# Patient Record
Sex: FEMALE | Race: WHITE | NOT HISPANIC OR LATINO | Employment: OTHER | URBAN - METROPOLITAN AREA
[De-identification: names, ages, dates, MRNs, and addresses within clinical notes are randomized per-mention and may not be internally consistent; named-entity substitution may affect disease eponyms.]

---

## 2017-01-05 DIAGNOSIS — G35 MULTIPLE SCLEROSIS: ICD-10-CM

## 2017-01-16 ENCOUNTER — TELEPHONE (OUTPATIENT)
Dept: NEUROLOGY | Facility: CLINIC | Age: 72
End: 2017-01-16

## 2017-01-16 NOTE — TELEPHONE ENCOUNTER
----- Message from Lauren Nicole sent at 1/13/2017 11:13 AM CST -----  Contact: self @ [phone]  Pt says she is returning sisi's call concerning her lab appt.

## 2017-01-16 NOTE — TELEPHONE ENCOUNTER
Tayla says she has enough medication on hand and will get labs done at [facility] on 1/20.

## 2017-01-20 ENCOUNTER — OFFICE VISIT (OUTPATIENT)
Dept: NEUROLOGY | Facility: CLINIC | Age: 72
End: 2017-01-20
Payer: MEDICARE

## 2017-01-20 VITALS
WEIGHT: 176.5 LBS | BODY MASS INDEX: 29.41 KG/M2 | SYSTOLIC BLOOD PRESSURE: 172 MMHG | DIASTOLIC BLOOD PRESSURE: 85 MMHG | HEIGHT: 65 IN | HEART RATE: 94 BPM

## 2017-01-20 DIAGNOSIS — G35 MS (MULTIPLE SCLEROSIS): Primary | ICD-10-CM

## 2017-01-20 DIAGNOSIS — E55.9 VITAMIN D DEFICIENCY: ICD-10-CM

## 2017-01-20 DIAGNOSIS — Z29.89 PROPHYLACTIC IMMUNOTHERAPY: ICD-10-CM

## 2017-01-20 DIAGNOSIS — Z71.89 COUNSELING REGARDING GOALS OF CARE: ICD-10-CM

## 2017-01-20 PROCEDURE — 99999 PR PBB SHADOW E&M-EST. PATIENT-LVL III: CPT | Mod: PBBFAC,,, | Performed by: PSYCHIATRY & NEUROLOGY

## 2017-01-20 PROCEDURE — 99213 OFFICE O/P EST LOW 20 MIN: CPT | Mod: PBBFAC | Performed by: PSYCHIATRY & NEUROLOGY

## 2017-01-20 PROCEDURE — 99215 OFFICE O/P EST HI 40 MIN: CPT | Mod: S$PBB,,, | Performed by: PSYCHIATRY & NEUROLOGY

## 2017-01-20 RX ORDER — INTERFERON BETA-1A 30MCG/.5ML
KIT INTRAMUSCULAR
Qty: 1 KIT | Refills: 5 | Status: SHIPPED | OUTPATIENT
Start: 2017-01-20 | End: 2017-06-27 | Stop reason: SDUPTHER

## 2017-01-20 NOTE — PROGRESS NOTES
Subjective:       Patient ID: Tayla Ewing is a 71 y.o. female who presents today for a routine clinic visit for MS.    MS HPI:  · DMT:Avonex  · Side effects from DMT? No  · Taking vitamin D3 as recommended? Yes -  Dose: 5,000 IU daily  · Overall, she feels stable  · Every so often she gets a sinus headache;  Ibuprofen helps;   · Things going ok with Avonex;  Uses PEN.  Gets flu like sx at times; not bad;   · No new neuro sx;   · Still gets MS hug; comes and goes; uses gabapentin    SOCIAL HISTORY  Social History   Substance Use Topics    Smoking status: Never Smoker    Smokeless tobacco: None    Alcohol use No     Living arrangements - the patient lives with her .  Employment Retired    MS ROS:  · Fatigue: Yes - mild fatigue; not bad;   · Sleep Disturbance: No  · Bladder Dysfunction: Yes - uses pad  · Bowel Dysfunction: Yes - constipation at times; not bad  · Spasticity: Yes - not taking tizanidine;   · Visual Symptoms: No  · Cognitive: Yes - memory ok;   · Mood Disorder: No  · Gait Disturbance: No  · Falls: No  · Hand Dysfunction: No   · Pain: No  · Sexual Dysfunction: Not Assessed  · Skin Breakdown: No  · Tremors: No  · Dysphagia:  No  · Dysarthria:  No  · Heat sensitivity:  Yes - mild;   · Any un-met adaptive needs? No  · Copay Assist?  No--pays $20 every 3 months;   · Clinical Trial candidate? Yes -         Objective:        25 foot timed walk: 3.6 seconds without assist    Neurologic Exam     MENTAL STATUS: Grossly intact.    CRANIAL NERVE EXAM: Shows no evidence of dysarthria or internuclear    ophthalmoplegia or pupillary abnormality.    MOTOR EXAM: Shows slight increase in tone in the lower extremities, but    strength is grossly full graded at 5/5 in all groups.    REFLEXES: 2+ and symmetric throughout. She has a silent right toe, left    toe down.    SENSORY EXAM: Shows slightly decreased vibration sense in the right lower    extremity.    COORDINATION: Shows normal finger-to-nose.    GAIT:  Narrow based and stable.        Labs:     Lab Results   Component Value Date    QTBMPFTL37NI 63 01/20/2017    UIADFTVF34EA 53 12/16/2015    TSAFCCNO67WX 40 12/12/2014       Diagnosis/Assessment/Plan:    1. Multiple Sclerosis  · Assessment: ZAK; pt interested in stopping Avonex; she is ambivalent; she's had no new relapses, no disabiltiy and no new lesions since starting Avonex 8 years ago; given this and her older age, I think reasonable to consider stopping DMT; will get new brain MRI first, and if no new lesions, then ok to stop Avonex  · [facility]  · Disease Modifying Therapies: as above; will check safety labs today;     2. MS Symptom Assessment / Management  · Fatigue: consider CoQ10 for fatigue  · Spasticity: advised to restart hs tizanidine       Over 50% of this 40 minute visit was spent in direct face to face counseling of the patient about her MS and the issues surrounding decisions to stop DMT     Follow up Shannen Solo PA-C      Vitamin D deficiency  -     Vitamin D; Future    MS (multiple sclerosis)  -     MRI Brain W WO Contrast; Future  -     Cancel: Creatinine, serum; Future; Expected date: 1/20/17  -     CBC auto differential; Future; Expected date: 1/20/17  -     Comprehensive metabolic panel; Future

## 2017-01-20 NOTE — MR AVS SNAPSHOT
[facility]  [address]  [facility] [address]  Phone: [phone]                  Tayla Ewing   2017 9:00 AM   Office Visit    Description:  Female : 1945   Provider:  Maria Del Carmen Martins MD   Department:  [facility]           Reason for Visit     Neurologic Problem           Diagnoses this Visit        Comments    Vitamin D deficiency    -  Primary     MS (multiple sclerosis)                To Do List           Future Appointments        Provider Department Dept Phone    2017 10:30 AM LAB, SAME DAY [facility] [phone]      Goals (5 Years of Data)     None      Follow-Up and Disposition     Return in about 6 months (around 2017).      [facility]     [facility] Care [facility] -  Assistance  Registered nurses in the [facility] provide clinical advisement, health education, appointment booking, and other advisory services.  Call for this free service at [phone].             Medications           Message regarding Medications     Verify the changes and/or additions to your medication regime listed below are the same as discussed with your clinician today.  If any of these changes or additions are incorrect, please notify your healthcare provider.             Verify that the below list of medications is an accurate representation of the medications you are currently taking.  If none reported, the list may be blank. If incorrect, please contact your healthcare provider. Carry this list with you in case of emergency.           Current Medications     AVONEX 30 mcg/0.5 mL PnKt INJECT 30 MCG INTRAMUSCULARLY EVERY 7 DAYS    azelastine-fluticasone 137-50 mcg/spray [place] by Nasal route as needed.     b complex vitamins tablet Take 1 tablet by mouth once daily.    cholecalciferol, vitamin D3, (VITAMIN D3) 5,000 unit Tab Take 5,000 Units by mouth once daily.    fexofenadine (ALLEGRA) 30 MG  "tablet Take 30 mg by mouth as needed.     gabapentin (NEURONTIN) 100 MG capsule Take 1 capsule (100 mg total) by mouth 3 (three) times daily.    losartan-hydrochlorothiazide 50-12.5 mg (HYZAAR) 50-12.5 mg per tablet Take 2 tablets by mouth once daily.     tizanidine 2 mg Cap Take 1 to 2 tabs po qhs prn    vitamin E 1000 UNIT capsule Take 1,000 Units by mouth once daily.    interferon beta-1a, albumin, (AVONEX, WITH ALBUMIN,) 30 mcg injection Inject 30 mcg into the muscle every 7 days.           Clinical Reference Information           Vital Signs - Last Recorded  Most recent update: 1/20/2017  9:20 AM by Anne Fry MA    BP Pulse Ht Wt BMI    (!) 172/85 94 5' 5" (1.651 m) 80.1 kg (176 lb 8 oz) 29.37 kg/m2      Blood Pressure          Most Recent Value    BP  (!)  172/85      Allergies as of 1/20/2017     No Known Allergies      Immunizations Administered on Date of Encounter - 1/20/2017     None      Orders Placed During Today's Visit      Normal Orders This Visit    MRI Brain W WO Contrast     Future Labs/Procedures Expected by Expires    CBC auto differential  1/20/2017 3/21/2018    Comprehensive metabolic panel  As directed 1/20/2018    MRI Brain W WO Contrast  As directed 1/20/2018    Vitamin D  As directed 1/20/2018      Instructions    Consider taking CoQ10 500 mg per day for fatigue       "

## 2017-01-31 DIAGNOSIS — R25.2 SPASTICITY: Primary | ICD-10-CM

## 2017-01-31 RX ORDER — TIZANIDINE HYDROCHLORIDE 2 MG/1
CAPSULE, GELATIN COATED ORAL
Qty: 60 CAPSULE | Refills: 6 | Status: SHIPPED | OUTPATIENT
Start: 2017-01-31 | End: 2017-11-29 | Stop reason: SDUPTHER

## 2017-06-27 ENCOUNTER — TELEPHONE (OUTPATIENT)
Dept: NEUROLOGY | Facility: CLINIC | Age: 72
End: 2017-06-27

## 2017-06-27 DIAGNOSIS — G35 MULTIPLE SCLEROSIS: ICD-10-CM

## 2017-06-27 DIAGNOSIS — Z79.899 HIGH RISK MEDICATION USE: ICD-10-CM

## 2017-06-27 DIAGNOSIS — G35 MULTIPLE SCLEROSIS: Primary | ICD-10-CM

## 2017-06-30 ENCOUNTER — TELEPHONE (OUTPATIENT)
Dept: NEUROLOGY | Facility: CLINIC | Age: 72
End: 2017-06-30

## 2017-06-30 NOTE — TELEPHONE ENCOUNTER
----- Message from Darlin Evans sent at 6/30/2017  1:50 PM CDT -----  Contact: Pt  Pt missed a call and would like the nurse to return their call.          Please call pt at [phone].        Thanks!

## 2017-06-30 NOTE — TELEPHONE ENCOUNTER
"Avonex safety labs scheduled on 7/5/2017. F/U appt scheduled with Shannen on 7/19. MRI Brain orders placed in "to be faxed" folder. Referral updated in epic.   "

## 2017-07-05 ENCOUNTER — DOCUMENTATION ONLY (OUTPATIENT)
Dept: NEUROLOGY | Facility: CLINIC | Age: 72
End: 2017-07-05

## 2017-07-05 NOTE — PROGRESS NOTES
Faxed Brain MRI Referral to [facility] at [phone] on 7/5/17

## 2017-07-07 ENCOUNTER — LAB VISIT (OUTPATIENT)
Dept: LAB | Facility: HOSPITAL | Age: 72
End: 2017-07-07
Attending: PHYSICIAN ASSISTANT
Payer: MEDICARE

## 2017-07-07 DIAGNOSIS — Z79.899 HIGH RISK MEDICATION USE: ICD-10-CM

## 2017-07-07 DIAGNOSIS — G35 MULTIPLE SCLEROSIS: ICD-10-CM

## 2017-07-07 LAB
ALBUMIN SERPL BCP-MCNC: 3.7 G/DL
ALP SERPL-CCNC: 87 U/L
ALT SERPL W/O P-5'-P-CCNC: 16 U/L
AST SERPL-CCNC: 14 U/L
BASOPHILS # BLD AUTO: 0 K/UL
BASOPHILS NFR BLD: 0.3 %
BILIRUB DIRECT SERPL-MCNC: 0.1 MG/DL
BILIRUB SERPL-MCNC: 0.4 MG/DL
DIFFERENTIAL METHOD: ABNORMAL
EOSINOPHIL # BLD AUTO: 0.1 K/UL
EOSINOPHIL NFR BLD: 1.5 %
ERYTHROCYTE [DISTWIDTH] IN BLOOD BY AUTOMATED COUNT: 13.4 %
HCT VFR BLD AUTO: 34.8 %
HGB BLD-MCNC: 12.3 G/DL
LYMPHOCYTES # BLD AUTO: 2.7 K/UL
LYMPHOCYTES NFR BLD: 35 %
MCH RBC QN AUTO: 30 PG
MCHC RBC AUTO-ENTMCNC: 35.2 %
MCV RBC AUTO: 85 FL
MONOCYTES # BLD AUTO: 0.7 K/UL
MONOCYTES NFR BLD: 9 %
NEUTROPHILS # BLD AUTO: 4.2 K/UL
NEUTROPHILS NFR BLD: 54.2 %
PLATELET # BLD AUTO: 239 K/UL
PMV BLD AUTO: 7.9 FL
PROT SERPL-MCNC: 7.5 G/DL
RBC # BLD AUTO: 4.08 M/UL
WBC # BLD AUTO: 7.7 K/UL

## 2017-07-07 PROCEDURE — 80076 HEPATIC FUNCTION PANEL: CPT

## 2017-07-07 PROCEDURE — 85025 COMPLETE CBC W/AUTO DIFF WBC: CPT

## 2017-07-07 PROCEDURE — 36415 COLL VENOUS BLD VENIPUNCTURE: CPT

## 2017-07-11 NOTE — TELEPHONE ENCOUNTER
----- Message from Demetrius Valle sent at 7/11/2017  3:33 PM CDT -----  Contact: pharmacy  Refill AVONEX 30 mcg/0.5 mL PnKt      Call; [phone]

## 2017-07-12 ENCOUNTER — DOCUMENTATION ONLY (OUTPATIENT)
Dept: NEUROLOGY | Facility: CLINIC | Age: 72
End: 2017-07-12

## 2017-07-12 RX ORDER — INTERFERON BETA-1A 30MCG/.5ML
KIT INTRAMUSCULAR
Qty: 3 KIT | Refills: 1 | Status: SHIPPED | OUTPATIENT
Start: 2017-07-12 | End: 2018-09-06

## 2017-07-12 NOTE — PROGRESS NOTES
Fax received from [facility] with brain MRI results (neuroquant) from 7/12/17. Placed in Shannen's folder for review.

## 2017-07-17 ENCOUNTER — DOCUMENTATION ONLY (OUTPATIENT)
Dept: NEUROLOGY | Facility: CLINIC | Age: 72
End: 2017-07-17

## 2017-07-19 ENCOUNTER — TELEPHONE (OUTPATIENT)
Dept: NEUROLOGY | Facility: CLINIC | Age: 72
End: 2017-07-19

## 2017-07-19 ENCOUNTER — OFFICE VISIT (OUTPATIENT)
Dept: NEUROLOGY | Facility: CLINIC | Age: 72
End: 2017-07-19
Payer: MEDICARE

## 2017-07-19 VITALS
HEIGHT: 65 IN | WEIGHT: 185 LBS | BODY MASS INDEX: 30.82 KG/M2 | HEART RATE: 76 BPM | SYSTOLIC BLOOD PRESSURE: 151 MMHG | DIASTOLIC BLOOD PRESSURE: 69 MMHG

## 2017-07-19 DIAGNOSIS — E55.9 VITAMIN D INSUFFICIENCY: ICD-10-CM

## 2017-07-19 DIAGNOSIS — M62.838 MUSCLE SPASM: ICD-10-CM

## 2017-07-19 DIAGNOSIS — G35 MS (MULTIPLE SCLEROSIS): Primary | ICD-10-CM

## 2017-07-19 DIAGNOSIS — Z71.89 COUNSELING REGARDING GOALS OF CARE: ICD-10-CM

## 2017-07-19 DIAGNOSIS — M79.2 NEUROPATHIC PAIN: ICD-10-CM

## 2017-07-19 DIAGNOSIS — N31.9 NEUROGENIC BLADDER: ICD-10-CM

## 2017-07-19 PROCEDURE — 1159F MED LIST DOCD IN RCRD: CPT | Mod: ,,, | Performed by: PHYSICIAN ASSISTANT

## 2017-07-19 PROCEDURE — 99213 OFFICE O/P EST LOW 20 MIN: CPT | Mod: PBBFAC | Performed by: PHYSICIAN ASSISTANT

## 2017-07-19 PROCEDURE — 99999 PR PBB SHADOW E&M-EST. PATIENT-LVL III: CPT | Mod: PBBFAC,,, | Performed by: PHYSICIAN ASSISTANT

## 2017-07-19 PROCEDURE — 1126F AMNT PAIN NOTED NONE PRSNT: CPT | Mod: ,,, | Performed by: PHYSICIAN ASSISTANT

## 2017-07-19 PROCEDURE — 99215 OFFICE O/P EST HI 40 MIN: CPT | Mod: S$PBB,,, | Performed by: PHYSICIAN ASSISTANT

## 2017-07-19 NOTE — PROGRESS NOTES
Subjective:       Patient ID: Tayla Ewing is a 72 y.o. female who presents today with  for a routine clinic visit for MS.    MS HPI:  · DMT: Avonex  · Side effects from DMT? No  · Taking vitamin D3 as recommended? Yes -  Dose: 10,000 IU daily  · Having headaches at times-managed with Tylenol   · Continues to have NP pain(tingling-head, LE's), but gabapentin is helpful  · Continues to be very active  · She states she would like to stop Avonex if her MRI was stable    SOCIAL HISTORY  Social History   Substance Use Topics    Smoking status: Never Smoker    Smokeless tobacco: Not on file    Alcohol use No     Living arrangements - the patient lives with her .  Employment Retired    MS ROS:  · Fatigue: Yes - tired at the end of the day  · Sleep Disturbance: No  · Bladder Dysfunction: Yes - frequency during the day, no problem overnight--defers medication  · Bowel Dysfunction: No  · Spasticity: Yes - tizandine 2mg HS occasionally   · Visual Symptoms: No  · Cognitive: Yes - memory issues, word finding. Comes and goes  · Mood Disorder: No  · Gait Disturbance: No  · Falls: No  · Hand Dysfunction: No  · Pain: Yes -  Gabapentin is helpful. Taking 100mg HS and will occasionally take during the day  · Sexual Dysfunction: Not Assessed  · Skin Breakdown: No  · Tremors:at times  · Dysphagia:  Yes - feels like food is getting at times.   · Dysarthria:  No  · Heat sensitivity:  Yes   · Any un-met adaptive needs? No  · Copay Assist?  Yes   · Clinical Trial candidate? No          Objective:        1. 25 foot timed walk: 3.6s last visit January 2017  Timed 25 Foot Walk: 7/19/2017   Did patient wear an AFO? No   Was assistive device used? No   Time for 25 Foot Walk (seconds) 3.6       Neurologic Exam     Mental Status   Oriented to person, place, and time.   Follows 3 step commands.   Speech: speech is normal   Level of consciousness: alert  Normal comprehension.     Cranial Nerves     CN II   Visual acuity: normal  (20/30 OD and 20/25 OS with Snellen hand held chart at 6 ft)    CN III, IV, VI   Pupils are equal, round, and reactive to light.  Extraocular motions are normal.     CN V   Facial sensation intact.     CN VII   Facial expression full, symmetric.     CN VIII   Hearing: impaired (Impaired since childhood R ear, decreased to finger rub on L )    CN IX, X   Palate: symmetric    CN XI   CN XI normal.     CN XII   Tongue deviation: none    Motor Exam   Muscle bulk: normal  Overall muscle tone: normal    Strength   Right deltoid: 5/5  Left deltoid: 5/5  Right triceps: 5/5  Left triceps: 5/5  Right wrist extension: 5/5  Left wrist extension: 5/5  Right interossei: 5/5  Left interossei: 5/5  Right iliopsoas: 5/5  Left iliopsoas: 5/5  Right hamstrin/5  Left hamstrin/5  Right anterior tibial: 5/5  Left anterior tibial: 5/5  Right peroneal: 5/5  Left peroneal: 5/5    Sensory Exam   Light touch normal.   Vibration normal.     Gait, Coordination, and Reflexes     Gait  Gait: normal    Coordination   Finger to nose coordination: abnormal (L Ue)  Tandem walking coordination: normal    Tremor   Resting tremor: absent  Action tremor: absent    Reflexes   Right brachioradialis: 2+  Left brachioradialis: 2+  Right biceps: 2+  Left biceps: 2+  Right triceps: 2+  Left triceps: 2+  Right patellar: 2+  Left patellar: 2+  Right achilles: 2+  Left achilles: 2+  Right plantar: normal  Left plantar: normal  Right ankle clonus: absent  Left ankle clonus: absent  Right pendular knee jerk: absent  Left pendular knee jerk: absentNormal Heel/toe walk  Normal RSM           Imaging:     MRI Brain and volumetric analysis report and disc received from [facility]  Performed on 2017  MRI Brain -stable with no new or enhancing lesions  Disc reviewed  Will upload into EPIC for future reference  Labs:     Lab Results   Component Value Date    WBC 7.70 2017    RBC 4.08 2017    HGB 12.3 2017    HCT 34.8 (L) 2017     MCV 85 07/07/2017    MCH 30.0 07/07/2017    MCHC 35.2 07/07/2017    RDW 13.4 07/07/2017     07/07/2017    MPV 7.9 (L) 07/07/2017    GRAN 4.2 07/07/2017    GRAN 54.2 07/07/2017    LYMPH 2.7 07/07/2017    LYMPH 35.0 07/07/2017    MONO 0.7 07/07/2017    MONO 9.0 07/07/2017    EOS 0.1 07/07/2017    BASO 0.00 07/07/2017    EOSINOPHIL 1.5 07/07/2017    BASOPHIL 0.3 07/07/2017     Lab Results   Component Value Date    ALT 16 07/07/2017    AST 14 07/07/2017    ALKPHOS 87 07/07/2017    BILITOT 0.4 07/07/2017         Lab Results   Component Value Date    GGMMOKXH57AD 63 01/20/2017    WSEIGGUJ41FH 53 12/16/2015    YKLLWRCQ34CG 40 12/12/2014     No results found for: JCVINDEX, JCVANTIBODY  No results found for: TE0VKQFA, ABSOLUTECD3, NB1AFWAR, ABSOLUTECD8, YT4YVKMF, ABSOLUTECD4, LABCD48    Diagnosis/Assessment/Plan:    1. Multiple Sclerosis  · Assessment: Patient does continue to appear to be stable. She would like to stop Avonex. I do feel this is reasonable considering her age and no new disability since diagnosis 8 years ago. Will evaluate need for new imaging at next follow up visit, may consider repeat imaging for the first 1-2 years off DMT.   · Imaging: MRI Brain stable, reviewed images with patient and  today in clinic  · Disease Modifying Therapies: Will stop Avonex now, she will continue with high dose Vit D3(10,000IU/day),  as more and more data suggests that high circulating blood levels of vitamin D has an ameliorating effect on the disease course in multiple sclerosis in general.       2. MS Symptom Assessment / Management  No changes to symptom management today.       Over 50% of this 40 minute visit was spent in direct face to face counseling of the patient and her  regarding her current symptoms and management of same.  Follow up in 6 months with Dr. Martins  Patient agreed to POC today.    Attending, Dr. Martins, was available during today's encounter. Any change to plan along with cosign to  appear in the EMR.     Shannen Solo PA-C  [facility]        MS (multiple sclerosis)    Neuropathic pain    Neurogenic bladder    Vitamin D insufficiency    Muscle spasm    Counseling regarding goals of care

## 2017-07-19 NOTE — PROGRESS NOTES
MRI Brain and volumetric analysis report and disc received from [facility]  Performed on 7/12/2017  MRI Brain -stable with no new or enhancing lesions  Disc reviewed  Will upload into EPIC for future reference

## 2017-07-19 NOTE — TELEPHONE ENCOUNTER
----- Message from Shannen Solo PA-C sent at 7/19/2017  9:58 AM CDT -----  We are discontinuing Avonex --she will not be on DMT any longer unless her status changes. Please notify pharmacy/drug company.

## 2017-07-26 ENCOUNTER — DOCUMENTATION ONLY (OUTPATIENT)
Dept: NEUROLOGY | Facility: CLINIC | Age: 72
End: 2017-07-26

## 2017-10-10 RX ORDER — LOSARTAN POTASSIUM AND HYDROCHLOROTHIAZIDE 25; 100 MG/1; MG/1
TABLET ORAL
Qty: 90 TABLET | Refills: 0 | Status: SHIPPED | OUTPATIENT
Start: 2017-10-10 | End: 2018-01-08 | Stop reason: SDUPTHER

## 2017-11-16 RX ORDER — FENOFIBRATE 134 MG/1
CAPSULE ORAL
Qty: 90 CAPSULE | Refills: 3 | Status: SHIPPED | OUTPATIENT
Start: 2017-11-16 | End: 2018-09-06

## 2017-11-29 DIAGNOSIS — R25.2 SPASTICITY: ICD-10-CM

## 2017-11-29 DIAGNOSIS — G35 MULTIPLE SCLEROSIS: ICD-10-CM

## 2017-11-29 DIAGNOSIS — G62.9 NEUROPATHY: ICD-10-CM

## 2017-11-29 RX ORDER — GABAPENTIN 100 MG/1
100 CAPSULE ORAL 3 TIMES DAILY
Qty: 90 CAPSULE | Refills: 5 | Status: SHIPPED | OUTPATIENT
Start: 2017-11-29

## 2017-11-29 RX ORDER — TIZANIDINE HYDROCHLORIDE 2 MG/1
CAPSULE, GELATIN COATED ORAL
Qty: 60 CAPSULE | Refills: 6 | Status: SHIPPED | OUTPATIENT
Start: 2017-11-29 | End: 2018-09-06

## 2018-01-08 RX ORDER — LOSARTAN POTASSIUM AND HYDROCHLOROTHIAZIDE 25; 100 MG/1; MG/1
TABLET ORAL
Qty: 90 TABLET | Refills: 0 | Status: SHIPPED | OUTPATIENT
Start: 2018-01-08 | End: 2018-04-08 | Stop reason: SDUPTHER

## 2018-03-19 ENCOUNTER — TELEPHONE (OUTPATIENT)
Dept: NEUROLOGY | Facility: CLINIC | Age: 73
End: 2018-03-19

## 2018-03-19 NOTE — TELEPHONE ENCOUNTER
----- Message from Mary Sierra sent at 3/19/2018  8:58 AM CDT -----  Contact: pt @ [phone]  Calling to schedule an appt with Dr. Martins. Please call.

## 2018-03-19 NOTE — TELEPHONE ENCOUNTER
Pt has been scheduled for f/u with BB. She also wanted to know if a MRI was to be done prior to f/u or would it be ordered after visit.

## 2018-04-09 RX ORDER — LOSARTAN POTASSIUM AND HYDROCHLOROTHIAZIDE 25; 100 MG/1; MG/1
TABLET ORAL
Qty: 90 TABLET | Refills: 0 | Status: SHIPPED | OUTPATIENT
Start: 2018-04-09 | End: 2018-09-06

## 2018-08-30 ENCOUNTER — TELEPHONE (OUTPATIENT)
Dept: NEUROLOGY | Facility: CLINIC | Age: 73
End: 2018-08-30

## 2018-08-30 NOTE — TELEPHONE ENCOUNTER
----- Message from Brett Paul sent at 8/30/2018  9:00 AM CDT -----  Contact: Patient @ [phone]  Patient is calling to r/s the 8-30th appt ( car issues ), pls call

## 2018-09-06 ENCOUNTER — OFFICE VISIT (OUTPATIENT)
Dept: NEUROLOGY | Facility: CLINIC | Age: 73
End: 2018-09-06
Payer: MEDICARE

## 2018-09-06 ENCOUNTER — DOCUMENTATION ONLY (OUTPATIENT)
Dept: NEUROLOGY | Facility: CLINIC | Age: 73
End: 2018-09-06

## 2018-09-06 ENCOUNTER — LAB VISIT (OUTPATIENT)
Dept: LAB | Facility: HOSPITAL | Age: 73
End: 2018-09-06
Payer: MEDICARE

## 2018-09-06 VITALS
WEIGHT: 167.44 LBS | HEART RATE: 71 BPM | DIASTOLIC BLOOD PRESSURE: 62 MMHG | BODY MASS INDEX: 27.9 KG/M2 | SYSTOLIC BLOOD PRESSURE: 130 MMHG | HEIGHT: 65 IN

## 2018-09-06 DIAGNOSIS — R25.2 SPASTICITY: ICD-10-CM

## 2018-09-06 DIAGNOSIS — G35 MULTIPLE SCLEROSIS: Primary | ICD-10-CM

## 2018-09-06 DIAGNOSIS — G35 MULTIPLE SCLEROSIS: ICD-10-CM

## 2018-09-06 DIAGNOSIS — Z71.89 COUNSELING REGARDING GOALS OF CARE: ICD-10-CM

## 2018-09-06 DIAGNOSIS — E55.9 VITAMIN D INSUFFICIENCY: ICD-10-CM

## 2018-09-06 DIAGNOSIS — M79.2 NEUROPATHIC PAIN: ICD-10-CM

## 2018-09-06 DIAGNOSIS — I25.2 HISTORY OF HEART ATTACK: ICD-10-CM

## 2018-09-06 DIAGNOSIS — Z95.5 HISTORY OF HEART ARTERY STENT: ICD-10-CM

## 2018-09-06 DIAGNOSIS — N31.9 NEUROGENIC BLADDER: ICD-10-CM

## 2018-09-06 LAB — 25(OH)D3+25(OH)D2 SERPL-MCNC: 51 NG/ML

## 2018-09-06 PROCEDURE — 36415 COLL VENOUS BLD VENIPUNCTURE: CPT

## 2018-09-06 PROCEDURE — 99999 PR PBB SHADOW E&M-EST. PATIENT-LVL III: CPT | Mod: PBBFAC,,, | Performed by: PHYSICIAN ASSISTANT

## 2018-09-06 PROCEDURE — 99213 OFFICE O/P EST LOW 20 MIN: CPT | Mod: PBBFAC | Performed by: PHYSICIAN ASSISTANT

## 2018-09-06 PROCEDURE — 99215 OFFICE O/P EST HI 40 MIN: CPT | Mod: S$PBB,,, | Performed by: PHYSICIAN ASSISTANT

## 2018-09-06 PROCEDURE — 82306 VITAMIN D 25 HYDROXY: CPT

## 2018-09-06 RX ORDER — OLMESARTAN MEDOXOMIL AND HYDROCHLOROTHIAZIDE 20/12.5 20; 12.5 MG/1; MG/1
1 TABLET ORAL DAILY
COMMUNITY
Start: 2018-08-27 | End: 2022-07-20

## 2018-09-06 RX ORDER — AMLODIPINE BESYLATE 5 MG/1
5 TABLET ORAL DAILY
COMMUNITY
Start: 2018-08-27

## 2018-09-06 RX ORDER — CARVEDILOL 6.25 MG/1
6.25 TABLET ORAL 2 TIMES DAILY
COMMUNITY
Start: 2018-08-27

## 2018-09-06 RX ORDER — ASPIRIN 81 MG/1
81 TABLET ORAL DAILY
COMMUNITY
End: 2022-07-13 | Stop reason: CLARIF

## 2018-09-06 RX ORDER — TICAGRELOR 90 MG/1
90 TABLET ORAL 2 TIMES DAILY
COMMUNITY
Start: 2018-08-27 | End: 2022-05-31

## 2018-09-06 RX ORDER — ATORVASTATIN CALCIUM 80 MG/1
80 TABLET, FILM COATED ORAL DAILY
COMMUNITY
Start: 2018-08-27 | End: 2022-05-31

## 2018-09-06 NOTE — PROGRESS NOTES
Subjective:       Patient ID: Tayla Ewing is a 73 y.o. female who presents today with her  for a routine clinic visit for MS.  Last seen in July 2017 by this provider. Unfortunately, did not follow up for 6 month visit    MS HPI:  · DMT: none-stopped DMT after last visit(mutual decision making)  · Side effects from DMT? N/A  · Taking vitamin D3 as recommended? Yes - 10,000IU/d   · Had Heart attack April of 2018-has initiated new meds(blood thinners, HTN, chol)  · She feels she is stable from MS perspective    SOCIAL HISTORY  Social History     Tobacco Use    Smoking status: Never Smoker   Substance Use Topics    Alcohol use: No    Drug use: Not on file     Living arrangements - the patient lives with their spouse.  Employment not employeed    MS ROS:  · Fatigue: Yes - tired at the end of the day, but she wakes early before 5AM  · Sleep Disturbance: No  · Bladder Dysfunction: Yes - frequency during the day, no problem overnight--defers medication--drinks a lot of coffee and tea  · Bowel Dysfunction: No  · Spasticity: Yes - tizandine 2mg HS occasionally   · Visual Symptoms: No  · Cognitive: Yes - memory issues, word finding. Comes and goes--states this is stable no worse  · Mood Disorder: No  · Gait Disturbance: No  · Falls: No  · Hand Dysfunction: No  · Pain: Yes -  Gabapentin is helpful. Taking 100mg HS if needed and will occasionally take during the day  · Sexual Dysfunction: Not Assessed  · Skin Breakdown: No  · Tremors:at times  · Dysarthria:  No  · Heat sensitivity:  Yes   · Any un-met adaptive needs? No  · Copay Assist?  Yes   · Clinical Trial candidate? No         Objective:        1. 25 foot timed walk: 3.6s previous visit; 3.7s today  Timed 25 Foot Walk: 7/19/2017   Did patient wear an AFO? No   Was assistive device used? No   Time for 25 Foot Walk (seconds) 3.6     Neurologic Exam     Mental Status   Oriented to person, place, and time.   Follows 3 step commands.   Speech: speech is normal    Level of consciousness: alert  Normal comprehension.     Cranial Nerves     CN II   Visual acuity: normal (20/30 OD and 20/20 OS with Snellen hand held chart at 6 ft)    CN III, IV, VI   Pupils are equal, round, and reactive to light.  Extraocular motions are normal.     CN V   Facial sensation intact.     CN VII   Facial expression full, symmetric.     CN VIII   Hearing: impaired (Impaired since childhood R ear, decreased to finger rub on L )    CN IX, X   Palate: symmetric    CN XI   CN XI normal.     CN XII   Tongue deviation: none    Motor Exam   Muscle bulk: normal  Overall muscle tone: normal    Strength   Right deltoid: 5/5  Left deltoid: 5/5  Right triceps: 5/5  Left triceps: 5/5  Right wrist extension: 5/5  Left wrist extension: 5/5  Right interossei: 5/5  Left interossei: 5/5  Right iliopsoas: 5/5  Left iliopsoas: 5/5  Right hamstrin/5  Left hamstrin/5  Right anterior tibial: 5/5  Left anterior tibial: 5/5  Right peroneal: 5/5  Left peroneal: 5/5    Sensory Exam   Light touch normal.   Vibration normal.     Gait, Coordination, and Reflexes     Gait  Gait: normal    Coordination   Finger to nose coordination: abnormal (L UE)  Tandem walking coordination: normal    Tremor   Resting tremor: absent  Action tremor: absent    Reflexes   Right brachioradialis: 2+  Left brachioradialis: 2+  Right biceps: 2+  Left biceps: 2+  Right triceps: 2+  Left triceps: 2+  Right patellar: 2+  Left patellar: 2+  Right achilles: 2+  Left achilles: 2+  Right plantar: normal  Left plantar: normal  Right ankle clonus: absent  Left ankle clonus: absent  Right pendular knee jerk: absent  Left pendular knee jerk: absentNormal Heel/toe walk  Normal RSM         Imagin/2017-[facility]    Labs:     Lab Results   Component Value Date    AGXHOQUV90HL 51 2018    BSVZKLJN35RG 63 2017    VRSXNUUT58LB 53 2015     No results found for: JCVINDEX, JCVANTIBODY  No results found for:  EO6STBPE, ABSOLUTECD3, GG0CAJGQ, ABSOLUTECD8, VS4KROHL, ABSOLUTECD4, LABCD48  Lab Results   Component Value Date    WBC 7.70 07/07/2017    RBC 4.08 07/07/2017    HGB 12.3 07/07/2017    HCT 34.8 (L) 07/07/2017    MCV 85 07/07/2017    MCH 30.0 07/07/2017    MCHC 35.2 07/07/2017    RDW 13.4 07/07/2017     07/07/2017    MPV 7.9 (L) 07/07/2017    GRAN 4.2 07/07/2017    GRAN 54.2 07/07/2017    LYMPH 2.7 07/07/2017    LYMPH 35.0 07/07/2017    MONO 0.7 07/07/2017    MONO 9.0 07/07/2017    EOS 0.1 07/07/2017    BASO 0.00 07/07/2017    EOSINOPHIL 1.5 07/07/2017    BASOPHIL 0.3 07/07/2017     Sodium   Date Value Ref Range Status   01/20/2017 141 136 - 145 mmol/L Final     Potassium   Date Value Ref Range Status   01/20/2017 4.1 3.5 - 5.1 mmol/L Final     Chloride   Date Value Ref Range Status   01/20/2017 107 95 - 110 mmol/L Final     CO2   Date Value Ref Range Status   01/20/2017 24 23 - 29 mmol/L Final     Glucose   Date Value Ref Range Status   01/20/2017 96 70 - 110 mg/dL Final     BUN, Bld   Date Value Ref Range Status   01/20/2017 18 8 - 23 mg/dL Final     Creatinine   Date Value Ref Range Status   01/20/2017 0.8 0.5 - 1.4 mg/dL Final     Calcium   Date Value Ref Range Status   01/20/2017 10.2 8.7 - 10.5 mg/dL Final     Total Protein   Date Value Ref Range Status   07/07/2017 7.5 6.0 - 8.4 g/dL Final     Albumin   Date Value Ref Range Status   07/07/2017 3.7 3.5 - 5.2 g/dL Final     Total Bilirubin   Date Value Ref Range Status   07/07/2017 0.4 0.1 - 1.0 mg/dL Final     Comment:     For infants and newborns, interpretation of results should be based  on gestational age, weight and in agreement with clinical  observations.  Premature Infant recommended reference ranges:  Up to 24 hours.............<8.0 mg/dL  Up to 48 hours............<12.0 mg/dL  3-5 days..................<15.0 mg/dL  6-29 days.................<15.0 mg/dL       Alkaline Phosphatase   Date Value Ref Range Status   07/07/2017 87 55 - 135 U/L Final      AST   Date Value Ref Range Status   07/07/2017 14 10 - 40 U/L Final     ALT   Date Value Ref Range Status   07/07/2017 16 10 - 44 U/L Final     Anion Gap   Date Value Ref Range Status   01/20/2017 10 8 - 16 mmol/L Final     eGFR if    Date Value Ref Range Status   01/20/2017 >60.0 >60 mL/min/1.73 m^2 Final     eGFR if non    Date Value Ref Range Status   01/20/2017 >60.0 >60 mL/min/1.73 m^2 Final     Comment:     Calculation used to obtain the estimated glomerular filtration  rate (eGFR) is the CKD-EPI equation. Since race is unknown   in our information system, the eGFR values for   -American and Non--American patients are given   for each creatinine result.       Diagnosis/Assessment/Plan:    1. Multiple Sclerosis  · Assessment: Patient appears clinically stable. She is not on DMT, but is on high dose Vit D3. Will request information from [facility] related to recent heart attack and stent placement(patient to sign release-will upload records)  · Imaging: I would like to get MRI without contrast to ensure stability as she has been off DMT for slightly over a year now.  · Disease Modifying Therapies: Continue Vit D3-will check level today. We will continue off DMT unless her MRI shows new lesions.     2. MS Symptom Assessment / Management  · Spasticity: continue Tizanidine 2mg PRN  · Pain: gabapentin 100mg as needed    Over 50% of this 40 minute visit was spent in direct face to face counseling of the patient about MS, DMT considerations, and MS symptom management.   Follow-up in about 6 months (around 3/6/2019) for follow up with Dr. Martins.  Patient agreed to POC today.    Attending, Dr. Martins, was available during today's encounter. Any change to plan along with cosign to appear in the EMR.     Shannen Solo PA-C  [facility]        Multiple sclerosis  -     Vitamin D; Future  -     MRI Brain Demyelinating Without Contrast; Future; Expected date: 09/06/2018    Vitamin D  insufficiency  -     Vitamin D; Future

## 2018-09-06 NOTE — PROGRESS NOTES
Faxed BRITT to [facility]      -discharge summary-Type of Stent placed.    [phone]-fax  [phone]-phone

## 2018-09-17 ENCOUNTER — TELEPHONE (OUTPATIENT)
Dept: NEUROLOGY | Facility: CLINIC | Age: 73
End: 2018-09-17

## 2018-09-17 NOTE — TELEPHONE ENCOUNTER
Notified Evie in Radiology that patient's stent card has been uploaded to media. Evie reviewed card information.

## 2018-09-20 ENCOUNTER — HOSPITAL ENCOUNTER (OUTPATIENT)
Dept: RADIOLOGY | Facility: HOSPITAL | Age: 73
Discharge: HOME | End: 2018-09-20
Attending: PHYSICIAN ASSISTANT
Payer: MEDICARE

## 2018-09-20 DIAGNOSIS — G35 MULTIPLE SCLEROSIS: ICD-10-CM

## 2018-09-20 PROCEDURE — 70551 MRI BRAIN STEM W/O DYE: CPT | Mod: 26,,, | Performed by: RADIOLOGY

## 2018-09-20 PROCEDURE — 70551 MRI BRAIN STEM W/O DYE: CPT | Mod: TC

## 2018-11-28 DIAGNOSIS — R25.2 SPASTICITY: ICD-10-CM

## 2018-11-28 DIAGNOSIS — G35 MULTIPLE SCLEROSIS: ICD-10-CM

## 2018-11-28 RX ORDER — TIZANIDINE HYDROCHLORIDE 2 MG/1
CAPSULE, GELATIN COATED ORAL
Qty: 60 CAPSULE | Refills: 0 | Status: SHIPPED | OUTPATIENT
Start: 2018-11-28 | End: 2018-12-31 | Stop reason: SDUPTHER

## 2018-12-30 DIAGNOSIS — G35 MULTIPLE SCLEROSIS: ICD-10-CM

## 2018-12-30 DIAGNOSIS — R25.2 SPASTICITY: ICD-10-CM

## 2018-12-31 RX ORDER — TIZANIDINE HYDROCHLORIDE 2 MG/1
CAPSULE, GELATIN COATED ORAL
Qty: 60 CAPSULE | Refills: 0 | Status: SHIPPED | OUTPATIENT
Start: 2018-12-31 | End: 2019-02-07 | Stop reason: SDUPTHER

## 2019-02-07 DIAGNOSIS — R25.2 SPASTICITY: ICD-10-CM

## 2019-02-07 DIAGNOSIS — G35 MULTIPLE SCLEROSIS: ICD-10-CM

## 2019-02-07 RX ORDER — TIZANIDINE HYDROCHLORIDE 2 MG/1
CAPSULE, GELATIN COATED ORAL
Qty: 60 CAPSULE | Refills: 5 | Status: SHIPPED | OUTPATIENT
Start: 2019-02-07 | End: 2022-07-13

## 2021-01-22 ENCOUNTER — PATIENT MESSAGE (OUTPATIENT)
Dept: PRIMARY CARE CLINIC | Facility: CLINIC | Age: 76
End: 2021-01-22

## 2021-01-22 DIAGNOSIS — U07.1 COVID-19 VIRUS INFECTION: Primary | ICD-10-CM

## 2021-01-24 ENCOUNTER — INFUSION (OUTPATIENT)
Dept: INFECTIOUS DISEASES | Facility: HOSPITAL | Age: 76
End: 2021-01-24
Attending: FAMILY MEDICINE
Payer: MEDICARE

## 2021-01-24 VITALS
RESPIRATION RATE: 20 BRPM | SYSTOLIC BLOOD PRESSURE: 131 MMHG | WEIGHT: 166 LBS | BODY MASS INDEX: 27.66 KG/M2 | OXYGEN SATURATION: 97 % | HEART RATE: 91 BPM | TEMPERATURE: 100 F | DIASTOLIC BLOOD PRESSURE: 76 MMHG | HEIGHT: 65 IN

## 2021-01-24 DIAGNOSIS — U07.1 COVID-19 VIRUS INFECTION: ICD-10-CM

## 2021-01-24 PROCEDURE — 63600175 PHARM REV CODE 636 W HCPCS: Performed by: INTERNAL MEDICINE

## 2021-01-24 PROCEDURE — M0239 BAMLANIVIMAB-XXXX INFUSION: HCPCS | Performed by: INTERNAL MEDICINE

## 2021-01-24 RX ORDER — SODIUM CHLORIDE 0.9 % (FLUSH) 0.9 %
10 SYRINGE (ML) INJECTION
Status: DISCONTINUED | OUTPATIENT
Start: 2021-01-24 | End: 2022-04-08 | Stop reason: HOSPADM

## 2021-01-24 RX ORDER — DIPHENHYDRAMINE HYDROCHLORIDE 50 MG/ML
25 INJECTION INTRAMUSCULAR; INTRAVENOUS ONCE AS NEEDED
Status: DISCONTINUED | OUTPATIENT
Start: 2021-01-24 | End: 2022-04-08 | Stop reason: HOSPADM

## 2021-01-24 RX ORDER — ONDANSETRON 4 MG/1
4 TABLET, ORALLY DISINTEGRATING ORAL ONCE AS NEEDED
Status: DISCONTINUED | OUTPATIENT
Start: 2021-01-24 | End: 2022-07-20 | Stop reason: HOSPADM

## 2021-01-24 RX ORDER — ALBUTEROL SULFATE 90 UG/1
2 AEROSOL, METERED RESPIRATORY (INHALATION)
Status: DISCONTINUED | OUTPATIENT
Start: 2021-01-24 | End: 2022-07-13 | Stop reason: CLARIF

## 2021-01-24 RX ORDER — ACETAMINOPHEN 325 MG/1
650 TABLET ORAL ONCE AS NEEDED
Status: DISCONTINUED | OUTPATIENT
Start: 2021-01-24 | End: 2023-06-09 | Stop reason: HOSPADM

## 2021-01-24 RX ORDER — EPINEPHRINE 0.1 MG/ML
0.3 INJECTION INTRAVENOUS
Status: DISCONTINUED | OUTPATIENT
Start: 2021-01-24 | End: 2022-07-20 | Stop reason: HOSPADM

## 2021-01-24 RX ADMIN — BAMLANIVIMAB 700 MG: 35 INJECTION, SOLUTION INTRAVENOUS at 10:01

## 2021-01-25 ENCOUNTER — TELEPHONE (OUTPATIENT)
Dept: ADMINISTRATIVE | Facility: CLINIC | Age: 76
End: 2021-01-25

## 2021-01-26 ENCOUNTER — TELEPHONE (OUTPATIENT)
Dept: ADMINISTRATIVE | Facility: OTHER | Age: 76
End: 2021-01-26

## 2021-02-05 ENCOUNTER — PATIENT MESSAGE (OUTPATIENT)
Dept: NEUROLOGY | Facility: CLINIC | Age: 76
End: 2021-02-05

## 2021-05-31 ENCOUNTER — PATIENT MESSAGE (OUTPATIENT)
Dept: PSYCHIATRY | Facility: CLINIC | Age: 76
End: 2021-05-31

## 2021-06-25 ENCOUNTER — TELEPHONE (OUTPATIENT)
Dept: PRIMARY CARE CLINIC | Facility: CLINIC | Age: 76
End: 2021-06-25

## 2021-06-25 DIAGNOSIS — Z12.31 ENCOUNTER FOR SCREENING MAMMOGRAM FOR BREAST CANCER: Primary | ICD-10-CM

## 2021-07-01 ENCOUNTER — HOSPITAL ENCOUNTER (OUTPATIENT)
Dept: RADIOLOGY | Facility: HOSPITAL | Age: 76
Discharge: HOME | End: 2021-07-01
Attending: NURSE PRACTITIONER
Payer: MEDICARE

## 2021-07-01 ENCOUNTER — TELEPHONE (OUTPATIENT)
Dept: PRIMARY CARE CLINIC | Facility: CLINIC | Age: 76
End: 2021-07-01

## 2021-07-01 DIAGNOSIS — Z12.31 ENCOUNTER FOR SCREENING MAMMOGRAM FOR BREAST CANCER: ICD-10-CM

## 2021-07-01 PROCEDURE — 77067 SCR MAMMO BI INCL CAD: CPT | Mod: TC,PO

## 2021-09-01 ENCOUNTER — PATIENT MESSAGE (OUTPATIENT)
Dept: PSYCHIATRY | Facility: CLINIC | Age: 76
End: 2021-09-01

## 2021-09-02 ENCOUNTER — PATIENT MESSAGE (OUTPATIENT)
Dept: PSYCHIATRY | Facility: CLINIC | Age: 76
End: 2021-09-02

## 2021-12-21 ENCOUNTER — PATIENT MESSAGE (OUTPATIENT)
Dept: NEUROLOGY | Facility: CLINIC | Age: 76
End: 2021-12-21
Payer: MEDICARE

## 2022-02-28 ENCOUNTER — PATIENT MESSAGE (OUTPATIENT)
Dept: PSYCHIATRY | Facility: CLINIC | Age: 77
End: 2022-02-28
Payer: MEDICARE

## 2022-04-05 ENCOUNTER — HOSPITAL ENCOUNTER (INPATIENT)
Facility: HOSPITAL | Age: 77
LOS: 3 days | Discharge: HOME | DRG: 391 | End: 2022-04-08
Attending: EMERGENCY MEDICINE | Admitting: INTERNAL MEDICINE
Payer: MEDICARE

## 2022-04-05 DIAGNOSIS — K57.92 DIVERTICULITIS: Primary | ICD-10-CM

## 2022-04-05 DIAGNOSIS — K57.92 ACUTE DIVERTICULITIS: ICD-10-CM

## 2022-04-05 LAB
ALBUMIN SERPL BCP-MCNC: 4.2 G/DL (ref 3.5–5.2)
ALP SERPL-CCNC: 74 U/L (ref 55–135)
ALT SERPL W/O P-5'-P-CCNC: 18 U/L (ref 10–44)
ANION GAP SERPL CALC-SCNC: 10 MMOL/L (ref 8–16)
AST SERPL-CCNC: 20 U/L (ref 10–40)
BASOPHILS # BLD AUTO: 0.03 K/UL (ref 0–0.2)
BASOPHILS NFR BLD: 0.2 % (ref 0–1.9)
BILIRUB SERPL-MCNC: 1.1 MG/DL (ref 0.1–1)
BILIRUB UR QL STRIP: NEGATIVE
BUN SERPL-MCNC: 20 MG/DL (ref 8–23)
CALCIUM SERPL-MCNC: 9.4 MG/DL (ref 8.7–10.5)
CHLORIDE SERPL-SCNC: 100 MMOL/L (ref 95–110)
CLARITY UR: CLEAR
CO2 SERPL-SCNC: 21 MMOL/L (ref 23–29)
COLOR UR: YELLOW
CREAT SERPL-MCNC: 0.8 MG/DL (ref 0.5–1.4)
DIFFERENTIAL METHOD: ABNORMAL
EOSINOPHIL # BLD AUTO: 0 K/UL (ref 0–0.5)
EOSINOPHIL NFR BLD: 0.1 % (ref 0–8)
ERYTHROCYTE [DISTWIDTH] IN BLOOD BY AUTOMATED COUNT: 13.6 % (ref 11.5–14.5)
EST. GFR  (AFRICAN AMERICAN): >60 ML/MIN/1.73 M^2
EST. GFR  (NON AFRICAN AMERICAN): >60 ML/MIN/1.73 M^2
GLUCOSE SERPL-MCNC: 122 MG/DL (ref 70–110)
GLUCOSE UR QL STRIP: NEGATIVE
HCT VFR BLD AUTO: 37 % (ref 37–48.5)
HGB BLD-MCNC: 12.4 G/DL (ref 12–16)
HGB UR QL STRIP: NEGATIVE
IMM GRANULOCYTES # BLD AUTO: 0.04 K/UL (ref 0–0.04)
IMM GRANULOCYTES NFR BLD AUTO: 0.3 % (ref 0–0.5)
KETONES UR QL STRIP: NEGATIVE
LEUKOCYTE ESTERASE UR QL STRIP: NEGATIVE
LYMPHOCYTES # BLD AUTO: 0.6 K/UL (ref 1–4.8)
LYMPHOCYTES NFR BLD: 3.7 % (ref 18–48)
MCH RBC QN AUTO: 29.9 PG (ref 27–31)
MCHC RBC AUTO-ENTMCNC: 33.5 G/DL (ref 32–36)
MCV RBC AUTO: 89 FL (ref 82–98)
MONOCYTES # BLD AUTO: 0.4 K/UL (ref 0.3–1)
MONOCYTES NFR BLD: 2.3 % (ref 4–15)
NEUTROPHILS # BLD AUTO: 14.3 K/UL (ref 1.8–7.7)
NEUTROPHILS NFR BLD: 93.4 % (ref 38–73)
NITRITE UR QL STRIP: NEGATIVE
NRBC BLD-RTO: 0 /100 WBC
PH UR STRIP: 6 [PH] (ref 5–8)
PLATELET # BLD AUTO: 235 K/UL (ref 150–450)
PMV BLD AUTO: 9.9 FL (ref 9.2–12.9)
POTASSIUM SERPL-SCNC: 3.7 MMOL/L (ref 3.5–5.1)
PROT SERPL-MCNC: 7.5 G/DL (ref 6–8.4)
PROT UR QL STRIP: NEGATIVE
RBC # BLD AUTO: 4.15 M/UL (ref 4–5.4)
SARS-COV-2 RDRP RESP QL NAA+PROBE: NEGATIVE
SODIUM SERPL-SCNC: 131 MMOL/L (ref 136–145)
SP GR UR STRIP: 1.01 (ref 1–1.03)
URN SPEC COLLECT METH UR: NORMAL
UROBILINOGEN UR STRIP-ACNC: NEGATIVE EU/DL
WBC # BLD AUTO: 15.3 K/UL (ref 3.9–12.7)

## 2022-04-05 PROCEDURE — 63600175 PHARM REV CODE 636 W HCPCS: Performed by: EMERGENCY MEDICINE

## 2022-04-05 PROCEDURE — 99223 1ST HOSP IP/OBS HIGH 75: CPT | Mod: ,,, | Performed by: SURGERY

## 2022-04-05 PROCEDURE — 80053 COMPREHEN METABOLIC PANEL: CPT | Performed by: EMERGENCY MEDICINE

## 2022-04-05 PROCEDURE — 81003 URINALYSIS AUTO W/O SCOPE: CPT | Performed by: EMERGENCY MEDICINE

## 2022-04-05 PROCEDURE — 99285 EMERGENCY DEPT VISIT HI MDM: CPT | Mod: 25

## 2022-04-05 PROCEDURE — 25000003 PHARM REV CODE 250: Performed by: INTERNAL MEDICINE

## 2022-04-05 PROCEDURE — 96365 THER/PROPH/DIAG IV INF INIT: CPT

## 2022-04-05 PROCEDURE — 99223 PR INITIAL HOSPITAL CARE,LEVL III: ICD-10-PCS | Mod: ,,, | Performed by: SURGERY

## 2022-04-05 PROCEDURE — S0030 INJECTION, METRONIDAZOLE: HCPCS | Performed by: EMERGENCY MEDICINE

## 2022-04-05 PROCEDURE — 63600175 PHARM REV CODE 636 W HCPCS

## 2022-04-05 PROCEDURE — 25500020 PHARM REV CODE 255: Performed by: EMERGENCY MEDICINE

## 2022-04-05 PROCEDURE — U0002 COVID-19 LAB TEST NON-CDC: HCPCS | Performed by: INTERNAL MEDICINE

## 2022-04-05 PROCEDURE — 63600175 PHARM REV CODE 636 W HCPCS: Performed by: INTERNAL MEDICINE

## 2022-04-05 PROCEDURE — 96375 TX/PRO/DX INJ NEW DRUG ADDON: CPT

## 2022-04-05 PROCEDURE — 85025 COMPLETE CBC W/AUTO DIFF WBC: CPT | Performed by: EMERGENCY MEDICINE

## 2022-04-05 PROCEDURE — 25000003 PHARM REV CODE 250: Performed by: EMERGENCY MEDICINE

## 2022-04-05 PROCEDURE — 12000002 HC ACUTE/MED SURGE SEMI-PRIVATE ROOM

## 2022-04-05 RX ORDER — MORPHINE SULFATE 2 MG/ML
2 INJECTION, SOLUTION INTRAMUSCULAR; INTRAVENOUS EVERY 4 HOURS PRN
Status: DISCONTINUED | OUTPATIENT
Start: 2022-04-05 | End: 2022-04-08 | Stop reason: HOSPADM

## 2022-04-05 RX ORDER — ONDANSETRON 2 MG/ML
4 INJECTION INTRAMUSCULAR; INTRAVENOUS
Status: COMPLETED | OUTPATIENT
Start: 2022-04-05 | End: 2022-04-05

## 2022-04-05 RX ORDER — ONDANSETRON 2 MG/ML
4 INJECTION INTRAMUSCULAR; INTRAVENOUS EVERY 6 HOURS PRN
Status: DISCONTINUED | OUTPATIENT
Start: 2022-04-05 | End: 2022-04-08 | Stop reason: HOSPADM

## 2022-04-05 RX ORDER — METRONIDAZOLE 500 MG/100ML
500 INJECTION, SOLUTION INTRAVENOUS
Status: COMPLETED | OUTPATIENT
Start: 2022-04-05 | End: 2022-04-05

## 2022-04-05 RX ORDER — ASPIRIN 81 MG/1
81 TABLET ORAL DAILY
Status: DISCONTINUED | OUTPATIENT
Start: 2022-04-06 | End: 2022-04-08 | Stop reason: HOSPADM

## 2022-04-05 RX ORDER — PANTOPRAZOLE SODIUM 40 MG/1
40 TABLET, DELAYED RELEASE ORAL DAILY
COMMUNITY

## 2022-04-05 RX ORDER — ATORVASTATIN CALCIUM 40 MG/1
80 TABLET, FILM COATED ORAL DAILY
Status: DISCONTINUED | OUTPATIENT
Start: 2022-04-06 | End: 2022-04-08 | Stop reason: HOSPADM

## 2022-04-05 RX ORDER — LEVOFLOXACIN 5 MG/ML
750 INJECTION, SOLUTION INTRAVENOUS
Status: COMPLETED | OUTPATIENT
Start: 2022-04-05 | End: 2022-04-05

## 2022-04-05 RX ORDER — MORPHINE SULFATE 2 MG/ML
2 INJECTION, SOLUTION INTRAMUSCULAR; INTRAVENOUS
Status: COMPLETED | OUTPATIENT
Start: 2022-04-05 | End: 2022-04-05

## 2022-04-05 RX ORDER — PANTOPRAZOLE SODIUM 40 MG/10ML
40 INJECTION, POWDER, LYOPHILIZED, FOR SOLUTION INTRAVENOUS DAILY
Status: DISCONTINUED | OUTPATIENT
Start: 2022-04-06 | End: 2022-04-06

## 2022-04-05 RX ORDER — SODIUM CHLORIDE 9 MG/ML
INJECTION, SOLUTION INTRAVENOUS CONTINUOUS
Status: DISCONTINUED | OUTPATIENT
Start: 2022-04-05 | End: 2022-04-06

## 2022-04-05 RX ADMIN — LEVOFLOXACIN 750 MG: 750 INJECTION, SOLUTION INTRAVENOUS at 07:04

## 2022-04-05 RX ADMIN — ONDANSETRON 4 MG: 2 INJECTION INTRAMUSCULAR; INTRAVENOUS at 06:04

## 2022-04-05 RX ADMIN — PIPERACILLIN SODIUM AND TAZOBACTAM SODIUM 3.38 G: 3; .375 INJECTION, POWDER, LYOPHILIZED, FOR SOLUTION INTRAVENOUS at 03:04

## 2022-04-05 RX ADMIN — METRONIDAZOLE 500 MG: 500 SOLUTION INTRAVENOUS at 06:04

## 2022-04-05 RX ADMIN — IOHEXOL 100 ML: 350 INJECTION, SOLUTION INTRAVENOUS at 04:04

## 2022-04-05 RX ADMIN — MORPHINE SULFATE 2 MG: 2 INJECTION, SOLUTION INTRAMUSCULAR; INTRAVENOUS at 06:04

## 2022-04-05 RX ADMIN — PIPERACILLIN SODIUM AND TAZOBACTAM SODIUM 3.38 G: 3; .375 INJECTION, POWDER, LYOPHILIZED, FOR SOLUTION INTRAVENOUS at 11:04

## 2022-04-05 RX ADMIN — SODIUM CHLORIDE: 0.9 INJECTION, SOLUTION INTRAVENOUS at 10:04

## 2022-04-05 RX ADMIN — MORPHINE SULFATE 2 MG: 2 INJECTION, SOLUTION INTRAMUSCULAR; INTRAVENOUS at 02:04

## 2022-04-05 NOTE — ED PROVIDER NOTES
Encounter Date: 4/5/2022       History     Chief Complaint   Patient presents with    Abdominal Pain     Lower abdomen going from left lower quadrant to right lower quadrant. Pt endorses difficulty moving bowels      Chief complaint is cramping lower abdominal pain that started after eating or 2nd medial of shrimp stool.  She did noon and then approximately 3:00 p.m..  Around 4:00 p.m. she started with abdominal pain.  No history of abdominal surgeries in the past.  She has a past medical history of hypertension multiple sclerosis that is in stable condition.        Review of patient's allergies indicates:  No Known Allergies  Past Medical History:   Diagnosis Date    Hypertension     Multiple sclerosis      Past Surgical History:   Procedure Laterality Date    BRAIN BIOPSY      2009    HYSTERECTOMY       No family history on file.  Social History     Tobacco Use    Smoking status: Never Smoker   Substance Use Topics    Alcohol use: No     Review of Systems   Constitutional: Negative for chills and fever.   HENT: Negative for ear pain, rhinorrhea and sore throat.    Eyes: Negative for pain and visual disturbance.   Respiratory: Negative for cough and shortness of breath.    Cardiovascular: Negative for chest pain and palpitations.   Gastrointestinal: Positive for abdominal pain. Negative for constipation, diarrhea, nausea and vomiting.   Genitourinary: Negative for dysuria, frequency, hematuria and urgency.   Musculoskeletal: Negative for back pain, joint swelling and myalgias.   Skin: Negative for rash.   Neurological: Negative for dizziness, seizures, weakness and headaches.   Psychiatric/Behavioral: Negative for dysphoric mood. The patient is not nervous/anxious.        Physical Exam     Initial Vitals   BP Pulse Resp Temp SpO2   04/05/22 0300 04/05/22 0300 04/05/22 0628 -- 04/05/22 0300   125/60 88 18  100 %      MAP       --                Physical Exam    Nursing note and vitals  reviewed.  Constitutional: She appears well-developed and well-nourished.   HENT:   Head: Normocephalic and atraumatic.   Eyes: Conjunctivae, EOM and lids are normal. Pupils are equal, round, and reactive to light.   Neck: Trachea normal. Neck supple. No thyroid mass and no thyromegaly present.   Normal range of motion.  Cardiovascular: Normal rate, regular rhythm and normal heart sounds.   Pulmonary/Chest: Effort normal and breath sounds normal.   Abdominal: Abdomen is soft. There is no abdominal tenderness.   Is tender to the lower abdomen no rebound.   Musculoskeletal:         General: Normal range of motion.      Cervical back: Normal range of motion and neck supple.     Neurological: She is alert and oriented to person, place, and time. She has normal strength and normal reflexes. No cranial nerve deficit or sensory deficit.   Skin: Skin is warm and dry.   Psychiatric: She has a normal mood and affect. Her speech is normal and behavior is normal. Judgment and thought content normal.         ED Course   Procedures  Labs Reviewed   CBC W/ AUTO DIFFERENTIAL - Abnormal; Notable for the following components:       Result Value    WBC 15.30 (*)     Gran # (ANC) 14.3 (*)     Lymph # 0.6 (*)     Gran % 93.4 (*)     Lymph % 3.7 (*)     Mono % 2.3 (*)     All other components within normal limits   COMPREHENSIVE METABOLIC PANEL - Abnormal; Notable for the following components:    Sodium 131 (*)     CO2 21 (*)     Glucose 122 (*)     Total Bilirubin 1.1 (*)     All other components within normal limits   URINALYSIS, REFLEX TO URINE CULTURE    Narrative:     Specimen Source->Urine          Imaging Results          CT Abdomen Pelvis With Contrast (Edited Result - FINAL)  Result time 04/05/22 06:18:15    Edited Result - FINAL by Trevin Nova DO (04/05/22 06:18:15)                 Narrative:         ADDENDUM #1       THIS REPORT CONTAINS FINDINGS THAT MAY BE CRITICAL TO PATIENT CARE: The findings were verbally discussed by  the reading radiologist, Dr. Nova, via telephone conference with Dr. Will Aguirre on 4/5/2022 at 6:09 AM CST.  The results were acknowledged and understood.    Electronically signed by:  Trevin Nova DO  4/5/2022 6:18 AM CDT Workstation: [id number]     ORIGINAL REPORT       EXAM:  CT Abdomen and Pelvis With Intravenous Contrast    FACILITY:  [facility]    REFERRING:  AAAREFERRAL SELF    CLINICAL HISTORY:  77 years, Female; abdominal pain    TECHNIQUE:  Axial computed tomography images of the abdomen and pelvis with intravenous contrast.  This CT exam was performed using one or more of the following dose reduction techniques:  automated exposure control, adjustment of the mA and/or kV according to patient size, and/or use of iterative reconstruction technique.    COMPARISON:  Pelvic ultrasound 7/31/2013    FINDINGS:  Lung bases:  Within the lung bases there is a peripheral 4 mm right lower lobe nodule.  Mediastinum:  There is a small hiatal hernia.    ABDOMEN:  Liver:  Unremarkable.  No mass.  Gallbladder and bile ducts:  Unremarkable.  No calcified stones.  No ductal dilation.  Pancreas:  There is a degree of fatty infiltration within the pancreas. There is no focal pancreatic lesion.  No ductal dilation.  Spleen:  Unremarkable.  No splenomegaly.  Adrenals:  Unremarkable.  No mass.  Kidneys and ureters:  There is a posterior 1.8 cm mid pole right renal cyst. Right renal cortical scarring is evident.  No hydronephrosis.  Stomach and bowel:  There is evidence of mild wall thickening within the proximal sigmoid colon with pericolonic inflammatory stranding. The findings are consistent with diverticulitis. There are a few foci of extraluminal free air identified anterior to the proximal sigmoid colon consistent with microperforation. There is no focally defined abscess. There is mild to moderate increased stool within the colon.  No obstruction.    PELVIS:  Appendix:  No findings to suggest acute  appendicitis.  Bladder:  The decompressed urinary bladder exhibits wall thickening and trabeculation with perivesicular stranding. Urinalysis correlation is recommended to exclude active UTI/cystitis.  Reproductive:  The uterus is surgically absent. There is findings which are most consistent with a remaining left ovary which exhibits heterogeneous attenuation as well as a focus of calcification and focal fat measuring up to 1.7 cm. Finding could be on the basis of left ovarian dermoid/teratoma although given other areas of complex attenuation within the left ovary, elective pelvic ultrasound reevaluation would be recommended.    ABDOMEN and PELVIS:  Intraperitoneal space:  See above.  Bones/joints:  No acute fracture.  No dislocation.  Soft tissues:  There is a small fat-containing periumbilical hernia measuring 1.3 cm.  Vasculature:  There is abdominal aortic atherosclerotic calcification with no evidence of aneurysm.  Lymph nodes:  Unremarkable.  No enlarged lymph nodes.    IMPRESSION:  1.  Findings consistent with diverticulitis of the proximal sigmoid colon with evidence of microperforation. No definite evidence of abscess.  2.  Urinary bladder wall thickening and trabeculation of perivesicular stranding. Urinalysis correlation is recommended to exclude active UTI/cystitis.  3.  Small hiatal hernia.  4.  Small fat-containing periumbilical hernia.  5.  Hysterectomy with remaining left ovary which exhibits changes associated with dermoid or teratoma. Elective pelvic ultrasound follow-up reevaluation is recommended.  6.  Small right renal cyst.  7.  4 mm right lower lobe nodule.    Pulmonary nodule(s) recommendation (based on 2017 Fleischner Society criteria and patient risk assessment):    Low risk patients with nodules less than 0.6 cm: No routine follow-up.    High risk patients with nodules less than 0.6 cm: CT follow-up at 12 months.    Based on Fleischner Society criteria published in 2017. These  recommendations do not apply to patients younger than 35 years, patients undergoing lung cancer screening, patients with immunosuppression or patients with a known primary cancer.    Electronically signed by:  Trevin Nova DO  4/5/2022 5:55 AM CDT Workstation: [id number]                  Final result by Trevin Nova DO (04/05/22 05:55:51)                 Narrative:    EXAM:  CT Abdomen and Pelvis With Intravenous Contrast    FACILITY:  [facility]    REFERRING:  AAAREFERRAL SELF    CLINICAL HISTORY:  77 years, Female; abdominal pain    TECHNIQUE:  Axial computed tomography images of the abdomen and pelvis with intravenous contrast.  This CT exam was performed using one or more of the following dose reduction techniques:  automated exposure control, adjustment of the mA and/or kV according to patient size, and/or use of iterative reconstruction technique.    COMPARISON:  Pelvic ultrasound 7/31/2013    FINDINGS:  Lung bases:  Within the lung bases there is a peripheral 4 mm right lower lobe nodule.  Mediastinum:  There is a small hiatal hernia.    ABDOMEN:  Liver:  Unremarkable.  No mass.  Gallbladder and bile ducts:  Unremarkable.  No calcified stones.  No ductal dilation.  Pancreas:  There is a degree of fatty infiltration within the pancreas. There is no focal pancreatic lesion.  No ductal dilation.  Spleen:  Unremarkable.  No splenomegaly.  Adrenals:  Unremarkable.  No mass.  Kidneys and ureters:  There is a posterior 1.8 cm mid pole right renal cyst. Right renal cortical scarring is evident.  No hydronephrosis.  Stomach and bowel:  There is evidence of mild wall thickening within the proximal sigmoid colon with pericolonic inflammatory stranding. The findings are consistent with diverticulitis. There are a few foci of extraluminal free air identified anterior to the proximal sigmoid colon consistent with microperforation. There is no focally defined abscess. There is mild to moderate increased  stool within the colon.  No obstruction.    PELVIS:  Appendix:  No findings to suggest acute appendicitis.  Bladder:  The decompressed urinary bladder exhibits wall thickening and trabeculation with perivesicular stranding. Urinalysis correlation is recommended to exclude active UTI/cystitis.  Reproductive:  The uterus is surgically absent. There is findings which are most consistent with a remaining left ovary which exhibits heterogeneous attenuation as well as a focus of calcification and focal fat measuring up to 1.7 cm. Finding could be on the basis of left ovarian dermoid/teratoma although given other areas of complex attenuation within the left ovary, elective pelvic ultrasound reevaluation would be recommended.    ABDOMEN and PELVIS:  Intraperitoneal space:  See above.  Bones/joints:  No acute fracture.  No dislocation.  Soft tissues:  There is a small fat-containing periumbilical hernia measuring 1.3 cm.  Vasculature:  There is abdominal aortic atherosclerotic calcification with no evidence of aneurysm.  Lymph nodes:  Unremarkable.  No enlarged lymph nodes.    IMPRESSION:  1.  Findings consistent with diverticulitis of the proximal sigmoid colon with evidence of microperforation. No definite evidence of abscess.  2.  Urinary bladder wall thickening and trabeculation of perivesicular stranding. Urinalysis correlation is recommended to exclude active UTI/cystitis.  3.  Small hiatal hernia.  4.  Small fat-containing periumbilical hernia.  5.  Hysterectomy with remaining left ovary which exhibits changes associated with dermoid or teratoma. Elective pelvic ultrasound follow-up reevaluation is recommended.  6.  Small right renal cyst.  7.  4 mm right lower lobe nodule.    Pulmonary nodule(s) recommendation (based on 2017 Fleischner Society criteria and patient risk assessment):    Low risk patients with nodules less than 0.6 cm: No routine follow-up.    High risk patients with nodules less than 0.6 cm: CT  follow-up at 12 months.    Based on Fleischner Society criteria published in 2017. These recommendations do not apply to patients younger than 35 years, patients undergoing lung cancer screening, patients with immunosuppression or patients with a known primary cancer.    Electronically signed by:  Trevin Nova DO  4/5/2022 5:55 AM CDT Workstation: [id number]                               Medications   levoFLOXacin 750 mg/150 mL IVPB 750 mg (has no administration in time range)   iohexoL (OMNIPAQUE 350) injection 100 mL (100 mLs Intravenous Given 4/5/22 0423)   metronidazole IVPB 500 mg (500 mg Intravenous New Bag 4/5/22 0629)   morphine injection 2 mg (2 mg Intravenous Given 4/5/22 0628)   ondansetron injection 4 mg (4 mg Intravenous Given 4/5/22 0628)     Medical Decision Making:   ED Management:  The patient has proximal sigmoid diverticulitis with microperforations.  No abscess noted.  CT reveals questionable  cystitis on bladder evaluation.  Will correlate with UA.                        Clinical Impression:   Final diagnoses:  [K57.92] Diverticulitis (Primary)          ED Disposition Condition    Observation               Vikas Vallejo Jr., MD  04/05/22 4011

## 2022-04-05 NOTE — NURSING
Pt lying in bed with  at bedside, blood pressure 94/47 hr 87 rr 18  o2 98% pt denies any needs at this time. Dr. Vital notified.

## 2022-04-06 LAB
ALBUMIN SERPL BCP-MCNC: 3.6 G/DL (ref 3.5–5.2)
ALP SERPL-CCNC: 69 U/L (ref 55–135)
ALT SERPL W/O P-5'-P-CCNC: 14 U/L (ref 10–44)
ANION GAP SERPL CALC-SCNC: 9 MMOL/L (ref 8–16)
AST SERPL-CCNC: 13 U/L (ref 10–40)
BILIRUB SERPL-MCNC: 1.1 MG/DL (ref 0.1–1)
BUN SERPL-MCNC: 14 MG/DL (ref 8–23)
CALCIUM SERPL-MCNC: 9.4 MG/DL (ref 8.7–10.5)
CHLORIDE SERPL-SCNC: 108 MMOL/L (ref 95–110)
CO2 SERPL-SCNC: 24 MMOL/L (ref 23–29)
CREAT SERPL-MCNC: 0.8 MG/DL (ref 0.5–1.4)
ERYTHROCYTE [DISTWIDTH] IN BLOOD BY AUTOMATED COUNT: 14.2 % (ref 11.5–14.5)
EST. GFR  (AFRICAN AMERICAN): >60 ML/MIN/1.73 M^2
EST. GFR  (NON AFRICAN AMERICAN): >60 ML/MIN/1.73 M^2
GLUCOSE SERPL-MCNC: 112 MG/DL (ref 70–110)
HCT VFR BLD AUTO: 35.1 % (ref 37–48.5)
HGB BLD-MCNC: 11.4 G/DL (ref 12–16)
MCH RBC QN AUTO: 30.2 PG (ref 27–31)
MCHC RBC AUTO-ENTMCNC: 32.5 G/DL (ref 32–36)
MCV RBC AUTO: 93 FL (ref 82–98)
PLATELET # BLD AUTO: 219 K/UL (ref 150–450)
PMV BLD AUTO: 10.4 FL (ref 9.2–12.9)
POTASSIUM SERPL-SCNC: 3.7 MMOL/L (ref 3.5–5.1)
PROT SERPL-MCNC: 6.8 G/DL (ref 6–8.4)
RBC # BLD AUTO: 3.78 M/UL (ref 4–5.4)
SODIUM SERPL-SCNC: 141 MMOL/L (ref 136–145)
WBC # BLD AUTO: 14.98 K/UL (ref 3.9–12.7)

## 2022-04-06 PROCEDURE — 63600175 PHARM REV CODE 636 W HCPCS

## 2022-04-06 PROCEDURE — 25000003 PHARM REV CODE 250: Performed by: INTERNAL MEDICINE

## 2022-04-06 PROCEDURE — 36415 COLL VENOUS BLD VENIPUNCTURE: CPT | Performed by: INTERNAL MEDICINE

## 2022-04-06 PROCEDURE — 63600175 PHARM REV CODE 636 W HCPCS: Performed by: INTERNAL MEDICINE

## 2022-04-06 PROCEDURE — 12000002 HC ACUTE/MED SURGE SEMI-PRIVATE ROOM

## 2022-04-06 PROCEDURE — 80053 COMPREHEN METABOLIC PANEL: CPT | Performed by: INTERNAL MEDICINE

## 2022-04-06 PROCEDURE — 85027 COMPLETE CBC AUTOMATED: CPT | Performed by: INTERNAL MEDICINE

## 2022-04-06 PROCEDURE — C9113 INJ PANTOPRAZOLE SODIUM, VIA: HCPCS | Performed by: INTERNAL MEDICINE

## 2022-04-06 PROCEDURE — 87040 BLOOD CULTURE FOR BACTERIA: CPT | Performed by: INTERNAL MEDICINE

## 2022-04-06 RX ORDER — FAMOTIDINE 20 MG/1
20 TABLET, FILM COATED ORAL 2 TIMES DAILY
Status: DISCONTINUED | OUTPATIENT
Start: 2022-04-06 | End: 2022-04-08 | Stop reason: HOSPADM

## 2022-04-06 RX ADMIN — PANTOPRAZOLE SODIUM 40 MG: 40 INJECTION, POWDER, LYOPHILIZED, FOR SOLUTION INTRAVENOUS at 08:04

## 2022-04-06 RX ADMIN — MORPHINE SULFATE 2 MG: 2 INJECTION, SOLUTION INTRAMUSCULAR; INTRAVENOUS at 08:04

## 2022-04-06 RX ADMIN — SODIUM CHLORIDE: 0.9 INJECTION, SOLUTION INTRAVENOUS at 05:04

## 2022-04-06 RX ADMIN — FAMOTIDINE 20 MG: 20 TABLET, FILM COATED ORAL at 09:04

## 2022-04-06 RX ADMIN — TICAGRELOR 90 MG: 90 TABLET ORAL at 08:04

## 2022-04-06 RX ADMIN — PIPERACILLIN SODIUM AND TAZOBACTAM SODIUM 3.38 G: 3; .375 INJECTION, POWDER, LYOPHILIZED, FOR SOLUTION INTRAVENOUS at 11:04

## 2022-04-06 RX ADMIN — ASPIRIN 81 MG: 81 TABLET, DELAYED RELEASE ORAL at 08:04

## 2022-04-06 RX ADMIN — ATORVASTATIN CALCIUM 80 MG: 40 TABLET, FILM COATED ORAL at 08:04

## 2022-04-06 RX ADMIN — MORPHINE SULFATE 2 MG: 2 INJECTION, SOLUTION INTRAMUSCULAR; INTRAVENOUS at 02:04

## 2022-04-06 RX ADMIN — PIPERACILLIN SODIUM AND TAZOBACTAM SODIUM 3.38 G: 3; .375 INJECTION, POWDER, LYOPHILIZED, FOR SOLUTION INTRAVENOUS at 03:04

## 2022-04-06 RX ADMIN — TICAGRELOR 90 MG: 90 TABLET ORAL at 09:04

## 2022-04-06 RX ADMIN — PIPERACILLIN SODIUM AND TAZOBACTAM SODIUM 3.38 G: 3; .375 INJECTION, POWDER, LYOPHILIZED, FOR SOLUTION INTRAVENOUS at 06:04

## 2022-04-06 NOTE — HPI
76 y/o female with hx of acute onset abdominal pain today. Mainly in lower quadrants.  Does not provide description of pain. Intense enough sought help at er.  Associated nausea/anorexia

## 2022-04-06 NOTE — SUBJECTIVE & OBJECTIVE
32 Interval History: pain better    Medications:  Continuous Infusions:   sodium chloride 0.9% 75 mL/hr at 04/06/22 0553     Scheduled Meds:   aspirin  81 mg Oral Daily    atorvastatin  80 mg Oral Daily    fluconazole (DIFLUCAN) IV (PEDS and ADULTS)  100 mg Intravenous Q24H    pantoprazole  40 mg Intravenous Daily    piperacillin-tazobactam (ZOSYN) IVPB  3.375 g Intravenous Q8H    ticagrelor  90 mg Oral BID     PRN Meds:morphine, ondansetron     Review of patient's allergies indicates:  No Known Allergies  Objective:     Vital Signs (Most Recent):  Temp: 98.8 °F (37.1 °C) (04/06/22 0850)  Pulse: 89 (04/06/22 0850)  Resp: 16 (04/06/22 0850)  BP: 116/61 (04/06/22 0850)  SpO2: 97 % (04/06/22 0850) Vital Signs (24h Range):  Temp:  [98.2 °F (36.8 °C)-98.9 °F (37.2 °C)] 98.8 °F (37.1 °C)  Pulse:  [] 89  Resp:  [16-18] 16  SpO2:  [96 %-99 %] 97 %  BP: (100-125)/(44-71) 116/61     Weight: 72.6 kg (160 lb 0.9 oz)  Body mass index is 26.63 kg/m².    Intake/Output - Last 3 Shifts         04/04 0700  04/05 0659 04/05 0700  04/06 0659 04/06 0700  04/07 0659    P.O.   240    I.V. (mL/kg)  900 (12.4)     IV Piggyback  401.7     Total Intake(mL/kg)  1301.7 (17.9) 240 (3.3)    Net  +1301.7 +240           Urine Occurrence  5 x 2 x            Physical Exam  Abdominal:      General: There is no distension.      Palpations: Abdomen is soft.      Tenderness: There is abdominal tenderness (llq).       Significant Labs:  I have reviewed all pertinent lab results within the past 24 hours.  CBC:   Recent Labs   Lab 04/06/22  0454   WBC 14.98*   RBC 3.78*   HGB 11.4*   HCT 35.1*      MCV 93   MCH 30.2   MCHC 32.5     BMP:   Recent Labs   Lab 04/06/22  0454   *      K 3.7      CO2 24   BUN 14   CREATININE 0.8   CALCIUM 9.4       Significant Diagnostics:  I have reviewed all pertinent imaging results/findings within the past 24 hours.   "Mulugeta Cadet, " MD

## 2022-04-06 NOTE — H&P
[facility]  History & Physical    Patient Name: Tayla Ewing  MRN: [id number]  Patient Class: IP- Inpatient  Admission Date: 4/5/2022  Attending Physician: Clemente Vital MD   Primary Care Provider: Marcel Mueller MD         Patient information was obtained from patient and ER records.     Subjective:     Principal Problem:Acute diverticulitis    Chief Complaint:   Chief Complaint   Patient presents with    Abdominal Pain     Lower abdomen going from left lower quadrant to right lower quadrant. Pt endorses difficulty moving bowels         HPI: 77 year old patient getting admitted with diverticulitis of the proximal sigmoid colon with microperforation  Pt started having Abdominal pain few days ago  Paon was mostly on Left quadrant area and later it spreaded all over abdomen  Pt thought she might had some problem with the food she ate along with gas in abdomen  Pain was persistent yesterday/Colicky with no aggravating and relieving factors  Today symptoms went worse and she came to hospital and got admitted  Pt denies any previous H/o diverticulitis before       Past Medical History:   Diagnosis Date    Hypertension     Multiple sclerosis        Past Surgical History:   Procedure Laterality Date    BRAIN BIOPSY      2009    HYSTERECTOMY         Review of patient's allergies indicates:  No Known Allergies    No current facility-administered medications on file prior to encounter.     Current Outpatient Medications on File Prior to Encounter   Medication Sig    amLODIPine (NORVASC) 5 MG tablet Take 5 mg by mouth once daily.    aspirin (ECOTRIN) 81 MG EC tablet Take 81 mg by mouth once daily.    atorvastatin (LIPITOR) 80 MG tablet Take 80 mg by mouth once daily.    b complex vitamins tablet Take 1 tablet by mouth once daily.    BRILINTA 90 mg tablet Take 90 mg by mouth 2 (two) times daily.    carvedilol (COREG) 6.25 MG tablet Take 6.25 mg by mouth 2 (two) times daily.     cholecalciferol, vitamin D3, 125 mcg (5,000 unit) Tab Take 5,000 Units by mouth once daily.    gabapentin (NEURONTIN) 100 MG capsule Take 1 capsule (100 mg total) by mouth 3 (three) times daily.    olmesartan-hydrochlorothiazide (BENICAR HCT) 20-12.5 mg per tablet Take 1 tablet by mouth once daily.    pantoprazole (PROTONIX) 40 MG tablet Take 40 mg by mouth once daily.    tiZANidine 2 mg Cap TAKE 1 TO 2 CAPSULES BY MOUTH EVERY NIGHT AT BEDTIME AS NEEDED (Patient taking differently: Take 1-2 capsules by mouth nightly as needed. TAKE 1 TO 2 CAPSULES BY MOUTH EVERY NIGHT AT BEDTIME AS NEEDED)    vitamin E 1000 UNIT capsule Take 1,000 Units by mouth once daily.    pulse oximeter (PULSE OXIMETER) device Use twice daily at 8 AM and 3 PM and record the value in MyChart as directed.    [DISCONTINUED] ondansetron (ZOFRAN-ODT) 4 MG TbDL Take 1 tablet (4 mg total) by mouth every 6 (six) hours as needed.     Family History       Problem Relation (Age of Onset)    Hypertension Mother          Tobacco Use    Smoking status: Never Smoker    Smokeless tobacco: Not on file   Substance and Sexual Activity    Alcohol use: No    Drug use: Not on file    Sexual activity: Not on file     Review of Systems   Constitutional:  Negative for activity change and appetite change.   HENT:  Negative for congestion and dental problem.    Eyes:  Negative for discharge and itching.   Respiratory:  Negative for shortness of breath.    Cardiovascular:  Negative for chest pain.   Gastrointestinal:  Positive for abdominal pain. Negative for abdominal distention.   Endocrine: Negative for cold intolerance.   Genitourinary:  Negative for difficulty urinating and dysuria.   Musculoskeletal:  Negative for arthralgias and back pain.   Skin:  Negative for color change.   Neurological:  Negative for dizziness and facial asymmetry.   Hematological:  Negative for adenopathy.   Psychiatric/Behavioral:  Negative for agitation and behavioral problems.     Objective:     Vital Signs (Most Recent):  Temp: 98.3 °F (36.8 °C) (04/05/22 1905)  Pulse: 102 (04/05/22 1905)  Resp: 18 (04/05/22 1905)  BP: 117/66 (04/05/22 1905)  SpO2: 97 % (04/05/22 1905) Vital Signs (24h Range):  Temp:  [98.2 °F (36.8 °C)-98.9 °F (37.2 °C)] 98.3 °F (36.8 °C)  Pulse:  [] 102  Resp:  [16-18] 18  SpO2:  [96 %-100 %] 97 %  BP: ()/(44-71) 117/66     Weight: 72.6 kg (160 lb 0.9 oz)  Body mass index is 26.63 kg/m².    Physical Exam  Constitutional:       General: She is not in acute distress.  HENT:      Head: Atraumatic.      Right Ear: External ear normal.      Left Ear: External ear normal.      Nose: Nose normal.      Mouth/Throat:      Mouth: Mucous membranes are moist.   Eyes:      General: No scleral icterus.  Cardiovascular:      Rate and Rhythm: Normal rate.      Heart sounds: No murmur heard.  Pulmonary:      Breath sounds: Normal breath sounds.   Abdominal:      General: There is no distension.   Musculoskeletal:      Cervical back: Normal range of motion.      Right lower leg: No edema.      Left lower leg: No edema.   Skin:     General: Skin is dry.   Neurological:      Mental Status: She is alert and oriented to person, place, and time.   Psychiatric:         Behavior: Behavior normal.           Significant Labs: All pertinent labs within the past 24 hours have been reviewed.  CBC:   Recent Labs   Lab 04/05/22  0321   WBC 15.30*   HGB 12.4   HCT 37.0        CMP:   Recent Labs   Lab 04/05/22  0321   *   K 3.7      CO2 21*   *   BUN 20   CREATININE 0.8   CALCIUM 9.4   PROT 7.5   ALBUMIN 4.2   BILITOT 1.1*   ALKPHOS 74   AST 20   ALT 18   ANIONGAP 10   EGFRNONAA >60.0       Significant Imaging: I have reviewed all pertinent imaging results/findings within the past 24 hours.    Assessment/Plan:     * Acute diverticulitis  Diverticulitis of the proximal sigmoid colon with evidence of microperforation  Start iv zosyn/iv Diflucan  Surgical consult   May  need home iv abx (mostly iv invanz) for 2 weeks if no surgical plan at the moment    History of heart attack  In 2018  Maintain present regime      MS (multiple sclerosis)  H/o MS  Stable issue         VTE Risk Mitigation (From admission, onward)         Ordered     IP VTE HIGH RISK PATIENT  Once         04/05/22 1005     Place sequential compression device  Until discontinued         04/05/22 1005                   Clemente Vital MD  Department of Hospital Medicine   [facility]

## 2022-04-06 NOTE — PROGRESS NOTES
[facility]  General Surgery  Progress Note    Subjective:     History of Present Illness:  78 y/o female with hx of acute onset abdominal pain today. Mainly in lower quadrants.  Does not provide description of pain. Intense enough sought help at er.  Associated nausea/anorexia      Post-Op Info:  * No surgery found *         Interval History: pain better    Medications:  Continuous Infusions:   sodium chloride 0.9% 75 mL/hr at 04/06/22 0553     Scheduled Meds:   aspirin  81 mg Oral Daily    atorvastatin  80 mg Oral Daily    fluconazole (DIFLUCAN) IV (PEDS and ADULTS)  100 mg Intravenous Q24H    pantoprazole  40 mg Intravenous Daily    piperacillin-tazobactam (ZOSYN) IVPB  3.375 g Intravenous Q8H    ticagrelor  90 mg Oral BID     PRN Meds:morphine, ondansetron     Review of patient's allergies indicates:  No Known Allergies  Objective:     Vital Signs (Most Recent):  Temp: 98.8 °F (37.1 °C) (04/06/22 0850)  Pulse: 89 (04/06/22 0850)  Resp: 16 (04/06/22 0850)  BP: 116/61 (04/06/22 0850)  SpO2: 97 % (04/06/22 0850) Vital Signs (24h Range):  Temp:  [98.2 °F (36.8 °C)-98.9 °F (37.2 °C)] 98.8 °F (37.1 °C)  Pulse:  [] 89  Resp:  [16-18] 16  SpO2:  [96 %-99 %] 97 %  BP: (100-125)/(44-71) 116/61     Weight: 72.6 kg (160 lb 0.9 oz)  Body mass index is 26.63 kg/m².    Intake/Output - Last 3 Shifts         04/04 0700  04/05 0659 04/05 0700  04/06 0659 04/06 0700  04/07 0659    P.O.   240    I.V. (mL/kg)  900 (12.4)     IV Piggyback  401.7     Total Intake(mL/kg)  1301.7 (17.9) 240 (3.3)    Net  +1301.7 +240           Urine Occurrence  5 x 2 x            Physical Exam  Abdominal:      General: There is no distension.      Palpations: Abdomen is soft.      Tenderness: There is abdominal tenderness (llq).       Significant Labs:  I have reviewed all pertinent lab results within the past 24 hours.  CBC:   Recent Labs   Lab 04/06/22  0454   WBC 14.98*   RBC 3.78*   HGB 11.4*   HCT 35.1*      MCV 93    MCH 30.2   MCHC 32.5     BMP:   Recent Labs   Lab 04/06/22  0454   *      K 3.7      CO2 24   BUN 14   CREATININE 0.8   CALCIUM 9.4       Significant Diagnostics:  I have reviewed all pertinent imaging results/findings within the past 24 hours.    Assessment/Plan:     * Acute diverticulitis  Getting better  Cl diet  Conservative management for now        Chirs Briscoe MD  General Surgery  [facility]

## 2022-04-06 NOTE — SUBJECTIVE & OBJECTIVE
Past Medical History:   Diagnosis Date    Hypertension     Multiple sclerosis        Past Surgical History:   Procedure Laterality Date    BRAIN BIOPSY      2009    HYSTERECTOMY         Review of patient's allergies indicates:  No Known Allergies    No current facility-administered medications on file prior to encounter.     Current Outpatient Medications on File Prior to Encounter   Medication Sig    amLODIPine (NORVASC) 5 MG tablet Take 5 mg by mouth once daily.    aspirin (ECOTRIN) 81 MG EC tablet Take 81 mg by mouth once daily.    atorvastatin (LIPITOR) 80 MG tablet Take 80 mg by mouth once daily.    b complex vitamins tablet Take 1 tablet by mouth once daily.    BRILINTA 90 mg tablet Take 90 mg by mouth 2 (two) times daily.    carvedilol (COREG) 6.25 MG tablet Take 6.25 mg by mouth 2 (two) times daily.    cholecalciferol, vitamin D3, 125 mcg (5,000 unit) Tab Take 5,000 Units by mouth once daily.    gabapentin (NEURONTIN) 100 MG capsule Take 1 capsule (100 mg total) by mouth 3 (three) times daily.    olmesartan-hydrochlorothiazide (BENICAR HCT) 20-12.5 mg per tablet Take 1 tablet by mouth once daily.    pantoprazole (PROTONIX) 40 MG tablet Take 40 mg by mouth once daily.    tiZANidine 2 mg Cap TAKE 1 TO 2 CAPSULES BY MOUTH EVERY NIGHT AT BEDTIME AS NEEDED (Patient taking differently: Take 1-2 capsules by mouth nightly as needed. TAKE 1 TO 2 CAPSULES BY MOUTH EVERY NIGHT AT BEDTIME AS NEEDED)    vitamin E 1000 UNIT capsule Take 1,000 Units by mouth once daily.    pulse oximeter (PULSE OXIMETER) device Use twice daily at 8 AM and 3 PM and [facility] as directed.    [DISCONTINUED] ondansetron (ZOFRAN-ODT) 4 MG TbDL Take 1 tablet (4 mg total) by mouth every 6 (six) hours as needed.     Family History       Problem Relation (Age of Onset)    Hypertension Mother          Tobacco Use    Smoking status: Never Smoker    Smokeless tobacco: Not on file   Substance and Sexual Activity    Alcohol use: No     Drug use: Not on file    Sexual activity: Not on file     Review of Systems   Constitutional:  Negative for activity change and appetite change.   HENT:  Negative for congestion and dental problem.    Eyes:  Negative for discharge and itching.   Respiratory:  Negative for shortness of breath.    Cardiovascular:  Negative for chest pain.   Gastrointestinal:  Positive for abdominal pain. Negative for abdominal distention.   Endocrine: Negative for cold intolerance.   Genitourinary:  Negative for difficulty urinating and dysuria.   Musculoskeletal:  Negative for arthralgias and back pain.   Skin:  Negative for color change.   Neurological:  Negative for dizziness and facial asymmetry.   Hematological:  Negative for adenopathy.   Psychiatric/Behavioral:  Negative for agitation and behavioral problems.    Objective:     Vital Signs (Most Recent):  Temp: 98.3 °F (36.8 °C) (04/05/22 1905)  Pulse: 102 (04/05/22 1905)  Resp: 18 (04/05/22 1905)  BP: 117/66 (04/05/22 1905)  SpO2: 97 % (04/05/22 1905) Vital Signs (24h Range):  Temp:  [98.2 °F (36.8 °C)-98.9 °F (37.2 °C)] 98.3 °F (36.8 °C)  Pulse:  [] 102  Resp:  [16-18] 18  SpO2:  [96 %-100 %] 97 %  BP: ()/(44-71) 117/66     Weight: 72.6 kg (160 lb 0.9 oz)  Body mass index is 26.63 kg/m².    Physical Exam  Constitutional:       General: She is not in acute distress.  HENT:      Head: Atraumatic.      Right Ear: External ear normal.      Left Ear: External ear normal.      Nose: Nose normal.      Mouth/Throat:      Mouth: Mucous membranes are moist.   Eyes:      General: No scleral icterus.  Cardiovascular:      Rate and Rhythm: Normal rate.      Heart sounds: No murmur heard.  Pulmonary:      Breath sounds: Normal breath sounds.   Abdominal:      General: There is no distension.   Musculoskeletal:      Cervical back: Normal range of motion.      Right lower leg: No edema.      Left lower leg: No edema.   Skin:     General: Skin is dry.   Neurological:      Mental  Status: She is alert and oriented to person, place, and time.   Psychiatric:         Behavior: Behavior normal.           Significant Labs: All pertinent labs within the past 24 hours have been reviewed.  CBC:   Recent Labs   Lab 04/05/22 0321   WBC 15.30*   HGB 12.4   HCT 37.0        CMP:   Recent Labs   Lab 04/05/22 0321   *   K 3.7      CO2 21*   *   BUN 20   CREATININE 0.8   CALCIUM 9.4   PROT 7.5   ALBUMIN 4.2   BILITOT 1.1*   ALKPHOS 74   AST 20   ALT 18   ANIONGAP 10   EGFRNONAA >60.0       Significant Imaging: I have reviewed all pertinent imaging results/findings within the past 24 hours.

## 2022-04-06 NOTE — ASSESSMENT & PLAN NOTE
Diverticulitis of the proximal sigmoid colon with evidence of microperforation  Start iv zosyn/iv Diflucan  Surgical consult   May need home iv abx (mostly iv invanz) for 2 weeks if no surgical plan at the moment

## 2022-04-06 NOTE — CONSULTS
[facility]  General Surgery  Consult Note    Patient Name: Tayla Ewing  MRN: [id number]  Code Status: Full Code  Admission Date: 4/5/2022  Hospital Length of Stay: 0 days  Attending Physician: Clemente Vital MD  Primary Care Provider: Marcel Mueller MD    Patient information was obtained from patient and ER records.     Inpatient consult to General Surgery  Consult performed by: Chris Briscoe MD  Consult ordered by: Clemente Vital MD  Reason for consult: diverticulitis  Assessment/Recommendations: Conservative management for now        Subjective:     Principal Problem: Acute diverticulitis    History of Present Illness: 78 y/o female with hx of acute onset abdominal pain today. Mainly in lower quadrants.  Does not provide description of pain. Intense enough sought help at er.  Associated nausea/anorexia      No current facility-administered medications on file prior to encounter.     Current Outpatient Medications on File Prior to Encounter   Medication Sig    amLODIPine (NORVASC) 5 MG tablet Take 5 mg by mouth once daily.    aspirin (ECOTRIN) 81 MG EC tablet Take 81 mg by mouth once daily.    atorvastatin (LIPITOR) 80 MG tablet Take 80 mg by mouth once daily.    b complex vitamins tablet Take 1 tablet by mouth once daily.    BRILINTA 90 mg tablet Take 90 mg by mouth 2 (two) times daily.    carvedilol (COREG) 6.25 MG tablet Take 6.25 mg by mouth 2 (two) times daily.    cholecalciferol, vitamin D3, 125 mcg (5,000 unit) Tab Take 5,000 Units by mouth once daily.    gabapentin (NEURONTIN) 100 MG capsule Take 1 capsule (100 mg total) by mouth 3 (three) times daily.    olmesartan-hydrochlorothiazide (BENICAR HCT) 20-12.5 mg per tablet Take 1 tablet by mouth once daily.    pantoprazole (PROTONIX) 40 MG tablet Take 40 mg by mouth once daily.    tiZANidine 2 mg Cap TAKE 1 TO 2 CAPSULES BY MOUTH EVERY NIGHT AT BEDTIME AS NEEDED (Patient taking differently: Take 1-2 capsules by mouth nightly as  needed. TAKE 1 TO 2 CAPSULES BY MOUTH EVERY NIGHT AT BEDTIME AS NEEDED)    vitamin E 1000 UNIT capsule Take 1,000 Units by mouth once daily.    pulse oximeter (PULSE OXIMETER) device Use twice daily at 8 AM and 3 PM and record the value in MyChart as directed.    [DISCONTINUED] ondansetron (ZOFRAN-ODT) 4 MG TbDL Take 1 tablet (4 mg total) by mouth every 6 (six) hours as needed.       Review of patient's allergies indicates:  No Known Allergies    Past Medical History:   Diagnosis Date    Hypertension     Multiple sclerosis      Past Surgical History:   Procedure Laterality Date    BRAIN BIOPSY      2009    HYSTERECTOMY       Family History       Problem Relation (Age of Onset)    Hypertension Mother          Tobacco Use    Smoking status: Never Smoker    Smokeless tobacco: Not on file   Substance and Sexual Activity    Alcohol use: No    Drug use: Not on file    Sexual activity: Not on file     Review of Systems   Constitutional:  Negative for activity change and appetite change.   HENT:  Negative for congestion and dental problem.    Eyes:  Negative for discharge and itching.   Respiratory:  Negative for shortness of breath.    Cardiovascular:  Negative for chest pain.   Gastrointestinal:  Positive for abdominal pain. Negative for abdominal distention.   Endocrine: Negative for cold intolerance.   Genitourinary:  Negative for difficulty urinating and dysuria.   Musculoskeletal:  Negative for arthralgias and back pain.   Skin:  Negative for color change.   Neurological:  Negative for dizziness and facial asymmetry.   Hematological:  Negative for adenopathy.   Psychiatric/Behavioral:  Negative for agitation and behavioral problems.    Objective:     Vital Signs (Most Recent):  Temp: 98.3 °F (36.8 °C) (04/05/22 1905)  Pulse: 102 (04/05/22 1905)  Resp: 18 (04/05/22 1905)  BP: 117/66 (04/05/22 1905)  SpO2: 97 % (04/05/22 1905) Vital Signs (24h Range):  Temp:  [98.2 °F (36.8 °C)-98.9 °F (37.2 °C)] 98.3 °F  (36.8 °C)  Pulse:  [] 102  Resp:  [16-18] 18  SpO2:  [96 %-100 %] 97 %  BP: ()/(44-71) 117/66     Weight: 72.6 kg (160 lb 0.9 oz)  Body mass index is 26.63 kg/m².    Physical Exam  Constitutional:       General: She is not in acute distress.  HENT:      Head: Atraumatic.      Right Ear: External ear normal.      Left Ear: External ear normal.      Nose: Nose normal.      Mouth/Throat:      Mouth: Mucous membranes are moist.   Eyes:      General: No scleral icterus.  Cardiovascular:      Rate and Rhythm: Normal rate.      Heart sounds: No murmur heard.  Pulmonary:      Breath sounds: Normal breath sounds.   Abdominal:      General: There is no distension.      Palpations: Abdomen is soft.      Tenderness: There is abdominal tenderness. There is guarding.      Comments: Guarding in lower quadrants in middle   Musculoskeletal:      Cervical back: Normal range of motion.      Right lower leg: No edema.      Left lower leg: No edema.   Skin:     General: Skin is dry.   Neurological:      Mental Status: She is alert and oriented to person, place, and time.   Psychiatric:         Behavior: Behavior normal.       Significant Labs:  I have reviewed all pertinent lab results within the past 24 hours.  CBC:   Recent Labs   Lab 04/05/22  0321   WBC 15.30*   RBC 4.15   HGB 12.4   HCT 37.0      MCV 89   MCH 29.9   MCHC 33.5     BMP:   Recent Labs   Lab 04/05/22  0321   *   *   K 3.7      CO2 21*   BUN 20   CREATININE 0.8   CALCIUM 9.4       Significant Diagnostics:  I have reviewed all pertinent imaging results/findings within the past 24 hours.  CT: I have reviewed all pertinent results/findings within the past 24 hours and my personal findings are:  sigmoid diverticulitis with localized free air in very small amount      Assessment/Plan:     * Acute diverticulitis  Leukocytosis, abdominal pain, tenderness-  CT images are independently reviewed and clinical picture fits ct findings of  diverticulitis with micro perforation- clinically with localized peritonitis.  She should continue antibiotics for now and I will order a cl diet tomorrow if her pain improves.  At my interview this evening her pain was already improving.      I have messaged Dr. Vital about her care.    This is a new problem with additional work up planned (cbc in am, monitoring clinical course, CT scan if deteriorates)      VTE Risk Mitigation (From admission, onward)         Ordered     IP VTE HIGH RISK PATIENT  Once         04/05/22 1005     Place sequential compression device  Until discontinued         04/05/22 1005                Thank you for your consult. I will follow-up with patient. Please contact us if you have any additional questions.    Chris Briscoe MD  General Surgery  [facility]

## 2022-04-06 NOTE — PLAN OF CARE
Important Message from Medicare was sign, explained and given to patient/caregiver on 04/06/2022 at 2:25pm     addressed any questions or concerns.    Important Message from Medicare document will be scanned into patient's medical record

## 2022-04-06 NOTE — ASSESSMENT & PLAN NOTE
Leukocytosis, abdominal pain, tenderness-  CT images are independently reviewed and clinical picture fits ct findings of diverticulitis with micro perforation- clinically with localized peritonitis.  She should continue antibiotics for now and I will order a cl diet tomorrow if her pain improves.  At my interview this evening her pain was already improving.      I have messaged Dr. Vital about her care.    This is a new problem with additional work up planned (cbc in am, monitoring clinical course, CT scan if deteriorates)

## 2022-04-06 NOTE — PLAN OF CARE
[facility]  Initial Discharge Assessment       Primary Care Provider: Marcel Mueller MD    Admission Diagnosis: Acute diverticulitis [K57.92]    Admission Date: 4/5/2022  Expected Discharge Date: 4/7/2022     Assessment completed with pt at bedside. Pt lives with spouse and plans to return home at discharge. She does not have home health and is independent with ADLs. No needs voiced at this time.    Discharge Barriers Identified: (P) None    Payor: MEDICARE / Plan: MEDICARE PART A & B / Product Type: Government /     Extended Emergency Contact Information  Primary Emergency Contact: Osei Ewing  Address: [address]  Home Phone: [phone]  Mobile Phone: [phone]  Relation: Spouse    Discharge Plan A: (P) Home with family  Discharge Plan B: (P) Home with family, Home Health      [id number] [place], [place] - [address] AT [facility] [address]  [address]  [facility] [address]  Phone: [phone] Fax: [phone]    [address] [address]  Phone: [phone] Fax: [phone]    [facility]  for [place] [address]  Phone: [phone] Fax: [phone]    [facility] - [place] [address]  Phone: [phone] Fax: [phone]      Initial Assessment (most recent)     Adult Discharge Assessment - 04/06/22 0937        Discharge Assessment    Assessment Type Discharge Planning Assessment (P)      Confirmed/corrected address, phone number and insurance Yes (P)      Confirmed Demographics Correct on Facesheet (P)      Source of Information patient;health record (P)      When was your last doctors appointment? 03/31/22 (P)    cardiologist    Communicated  JAYSHREE with patient/caregiver Date not available/Unable to determine (P)      Reason For Admission acute diverticulitis (P)      Lives With spouse (P)      Facility Arrived From: home (P)      Do you expect to return to your current living situation? Yes (P)      Do you have help at home or someone to help you manage your care at home? Yes (P)      Who are your caregiver(s) and their phone number(s)? Spouse Osei López [phone] (P)      Prior to hospitilization cognitive status: Alert/Oriented (P)      Current cognitive status: Alert/Oriented (P)      Walking or Climbing Stairs Difficulty none (P)      Dressing/Bathing Difficulty none (P)      Equipment Currently Used at Home none (P)      Readmission within 30 days? No (P)      Patient currently being followed by outpatient case management? No (P)      Do you currently have service(s) that help you manage your care at home? No (P)      Do you take prescription medications? Yes (P)      Do you have prescription coverage? Yes (P)      Coverage Medicare and [employer] (P)      Do you have any problems affording any of your prescribed medications? No (P)      Is the patient taking medications as prescribed? yes (P)      Who is going to help you get home at discharge? spouse (P)      How do you get to doctors appointments? car, drives self (P)      Are you on dialysis? No (P)      Do you take coumadin? No (P)      Discharge Plan A Home with family (P)      Discharge Plan B Home with family;Home Health (P)      DME Needed Upon Discharge  none (P)      Discharge Plan discussed with: Patient (P)      Discharge Barriers Identified None (P)

## 2022-04-06 NOTE — HPI
77 year old patient getting admitted with diverticulitis of the proximal sigmoid colon with microperforation  Pt started having Abdominal pain few days ago  Paon was mostly on Left quadrant area and later it spreaded all over abdomen  Pt thought she might had some problem with the food she ate along with gas in abdomen  Pain was persistent yesterday/Colicky with no aggravating and relieving factors  Today symptoms went worse and she came to hospital and got admitted  Pt denies any previous H/o diverticulitis before

## 2022-04-06 NOTE — SUBJECTIVE & OBJECTIVE
No current facility-administered medications on file prior to encounter.     Current Outpatient Medications on File Prior to Encounter   Medication Sig    amLODIPine (NORVASC) 5 MG tablet Take 5 mg by mouth once daily.    aspirin (ECOTRIN) 81 MG EC tablet Take 81 mg by mouth once daily.    atorvastatin (LIPITOR) 80 MG tablet Take 80 mg by mouth once daily.    b complex vitamins tablet Take 1 tablet by mouth once daily.    BRILINTA 90 mg tablet Take 90 mg by mouth 2 (two) times daily.    carvedilol (COREG) 6.25 MG tablet Take 6.25 mg by mouth 2 (two) times daily.    cholecalciferol, vitamin D3, 125 mcg (5,000 unit) Tab Take 5,000 Units by mouth once daily.    gabapentin (NEURONTIN) 100 MG capsule Take 1 capsule (100 mg total) by mouth 3 (three) times daily.    olmesartan-hydrochlorothiazide (BENICAR HCT) 20-12.5 mg per tablet Take 1 tablet by mouth once daily.    pantoprazole (PROTONIX) 40 MG tablet Take 40 mg by mouth once daily.    tiZANidine 2 mg Cap TAKE 1 TO 2 CAPSULES BY MOUTH EVERY NIGHT AT BEDTIME AS NEEDED (Patient taking differently: Take 1-2 capsules by mouth nightly as needed. TAKE 1 TO 2 CAPSULES BY MOUTH EVERY NIGHT AT BEDTIME AS NEEDED)    vitamin E 1000 UNIT capsule Take 1,000 Units by mouth once daily.    pulse oximeter (PULSE OXIMETER) device Use twice daily at 8 AM and 3 PM and [facility] as directed.    [DISCONTINUED] ondansetron (ZOFRAN-ODT) 4 MG TbDL Take 1 tablet (4 mg total) by mouth every 6 (six) hours as needed.       Review of patient's allergies indicates:  No Known Allergies    Past Medical History:   Diagnosis Date    Hypertension     Multiple sclerosis      Past Surgical History:   Procedure Laterality Date    BRAIN BIOPSY      2009    HYSTERECTOMY       Family History       Problem Relation (Age of Onset)    Hypertension Mother          Tobacco Use    Smoking status: Never Smoker    Smokeless tobacco: Not on file   Substance and Sexual Activity    Alcohol use: No     Drug use: Not on file    Sexual activity: Not on file     Review of Systems   Constitutional:  Negative for activity change and appetite change.   HENT:  Negative for congestion and dental problem.    Eyes:  Negative for discharge and itching.   Respiratory:  Negative for shortness of breath.    Cardiovascular:  Negative for chest pain.   Gastrointestinal:  Positive for abdominal pain. Negative for abdominal distention.   Endocrine: Negative for cold intolerance.   Genitourinary:  Negative for difficulty urinating and dysuria.   Musculoskeletal:  Negative for arthralgias and back pain.   Skin:  Negative for color change.   Neurological:  Negative for dizziness and facial asymmetry.   Hematological:  Negative for adenopathy.   Psychiatric/Behavioral:  Negative for agitation and behavioral problems.    Objective:     Vital Signs (Most Recent):  Temp: 98.3 °F (36.8 °C) (04/05/22 1905)  Pulse: 102 (04/05/22 1905)  Resp: 18 (04/05/22 1905)  BP: 117/66 (04/05/22 1905)  SpO2: 97 % (04/05/22 1905) Vital Signs (24h Range):  Temp:  [98.2 °F (36.8 °C)-98.9 °F (37.2 °C)] 98.3 °F (36.8 °C)  Pulse:  [] 102  Resp:  [16-18] 18  SpO2:  [96 %-100 %] 97 %  BP: ()/(44-71) 117/66     Weight: 72.6 kg (160 lb 0.9 oz)  Body mass index is 26.63 kg/m².    Physical Exam  Constitutional:       General: She is not in acute distress.  HENT:      Head: Atraumatic.      Right Ear: External ear normal.      Left Ear: External ear normal.      Nose: Nose normal.      Mouth/Throat:      Mouth: Mucous membranes are moist.   Eyes:      General: No scleral icterus.  Cardiovascular:      Rate and Rhythm: Normal rate.      Heart sounds: No murmur heard.  Pulmonary:      Breath sounds: Normal breath sounds.   Abdominal:      General: There is no distension.      Palpations: Abdomen is soft.      Tenderness: There is abdominal tenderness. There is guarding.      Comments: Guarding in lower quadrants in middle   Musculoskeletal:      Cervical  back: Normal range of motion.      Right lower leg: No edema.      Left lower leg: No edema.   Skin:     General: Skin is dry.   Neurological:      Mental Status: She is alert and oriented to person, place, and time.   Psychiatric:         Behavior: Behavior normal.       Significant Labs:  I have reviewed all pertinent lab results within the past 24 hours.  CBC:   Recent Labs   Lab 04/05/22  0321   WBC 15.30*   RBC 4.15   HGB 12.4   HCT 37.0      MCV 89   MCH 29.9   MCHC 33.5     BMP:   Recent Labs   Lab 04/05/22  0321   *   *   K 3.7      CO2 21*   BUN 20   CREATININE 0.8   CALCIUM 9.4       Significant Diagnostics:  I have reviewed all pertinent imaging results/findings within the past 24 hours.  CT: I have reviewed all pertinent results/findings within the past 24 hours and my personal findings are:  sigmoid diverticulitis with localized free air in very small amount

## 2022-04-07 LAB
ALBUMIN SERPL BCP-MCNC: 3.4 G/DL (ref 3.5–5.2)
ALP SERPL-CCNC: 72 U/L (ref 55–135)
ALT SERPL W/O P-5'-P-CCNC: 13 U/L (ref 10–44)
ANION GAP SERPL CALC-SCNC: 13 MMOL/L (ref 8–16)
AST SERPL-CCNC: 13 U/L (ref 10–40)
BILIRUB SERPL-MCNC: 1.6 MG/DL (ref 0.1–1)
BUN SERPL-MCNC: 9 MG/DL (ref 8–23)
CALCIUM SERPL-MCNC: 9.5 MG/DL (ref 8.7–10.5)
CHLORIDE SERPL-SCNC: 103 MMOL/L (ref 95–110)
CO2 SERPL-SCNC: 23 MMOL/L (ref 23–29)
CREAT SERPL-MCNC: 0.6 MG/DL (ref 0.5–1.4)
ERYTHROCYTE [DISTWIDTH] IN BLOOD BY AUTOMATED COUNT: 13.8 % (ref 11.5–14.5)
EST. GFR  (AFRICAN AMERICAN): >60 ML/MIN/1.73 M^2
EST. GFR  (NON AFRICAN AMERICAN): >60 ML/MIN/1.73 M^2
GLUCOSE SERPL-MCNC: 119 MG/DL (ref 70–110)
HCT VFR BLD AUTO: 35.6 % (ref 37–48.5)
HGB BLD-MCNC: 11.2 G/DL (ref 12–16)
MCH RBC QN AUTO: 29.5 PG (ref 27–31)
MCHC RBC AUTO-ENTMCNC: 31.5 G/DL (ref 32–36)
MCV RBC AUTO: 94 FL (ref 82–98)
PLATELET # BLD AUTO: 232 K/UL (ref 150–450)
PMV BLD AUTO: 10.7 FL (ref 9.2–12.9)
POTASSIUM SERPL-SCNC: 3.5 MMOL/L (ref 3.5–5.1)
PROT SERPL-MCNC: 7 G/DL (ref 6–8.4)
RBC # BLD AUTO: 3.8 M/UL (ref 4–5.4)
SODIUM SERPL-SCNC: 139 MMOL/L (ref 136–145)
WBC # BLD AUTO: 13.13 K/UL (ref 3.9–12.7)

## 2022-04-07 PROCEDURE — 12000002 HC ACUTE/MED SURGE SEMI-PRIVATE ROOM

## 2022-04-07 PROCEDURE — 85027 COMPLETE CBC AUTOMATED: CPT | Performed by: INTERNAL MEDICINE

## 2022-04-07 PROCEDURE — 36415 COLL VENOUS BLD VENIPUNCTURE: CPT | Performed by: INTERNAL MEDICINE

## 2022-04-07 PROCEDURE — 63600175 PHARM REV CODE 636 W HCPCS

## 2022-04-07 PROCEDURE — 63600175 PHARM REV CODE 636 W HCPCS: Performed by: INTERNAL MEDICINE

## 2022-04-07 PROCEDURE — 80053 COMPREHEN METABOLIC PANEL: CPT | Performed by: INTERNAL MEDICINE

## 2022-04-07 PROCEDURE — 25000003 PHARM REV CODE 250: Performed by: INTERNAL MEDICINE

## 2022-04-07 RX ADMIN — PIPERACILLIN SODIUM AND TAZOBACTAM SODIUM 3.38 G: 3; .375 INJECTION, POWDER, LYOPHILIZED, FOR SOLUTION INTRAVENOUS at 02:04

## 2022-04-07 RX ADMIN — PIPERACILLIN SODIUM AND TAZOBACTAM SODIUM 3.38 G: 3; .375 INJECTION, POWDER, LYOPHILIZED, FOR SOLUTION INTRAVENOUS at 11:04

## 2022-04-07 RX ADMIN — TICAGRELOR 90 MG: 90 TABLET ORAL at 09:04

## 2022-04-07 RX ADMIN — PIPERACILLIN SODIUM AND TAZOBACTAM SODIUM 3.38 G: 3; .375 INJECTION, POWDER, LYOPHILIZED, FOR SOLUTION INTRAVENOUS at 06:04

## 2022-04-07 RX ADMIN — FAMOTIDINE 20 MG: 20 TABLET, FILM COATED ORAL at 09:04

## 2022-04-07 RX ADMIN — ATORVASTATIN CALCIUM 80 MG: 40 TABLET, FILM COATED ORAL at 09:04

## 2022-04-07 RX ADMIN — ASPIRIN 81 MG: 81 TABLET, DELAYED RELEASE ORAL at 09:04

## 2022-04-07 NOTE — PROGRESS NOTES
[facility] Medicine  Progress Note    Patient Name: Tayla Ewing  MRN: [id number]  Patient Class: IP- Inpatient   Admission Date: 4/5/2022  Length of Stay: 2 days  Attending Physician: Clemente Vital MD  Primary Care Provider: Marcel Mueller MD        Subjective:     Principal Problem:Acute diverticulitis        HPI:  77 year old patient getting admitted with diverticulitis of the proximal sigmoid colon with microperforation  Pt started having Abdominal pain few days ago  Paon was mostly on Left quadrant area and later it spreaded all over abdomen  Pt thought she might had some problem with the food she ate along with gas in abdomen  Pain was persistent yesterday/Colicky with no aggravating and relieving factors  Today symptoms went worse and she came to hospital and got admitted  Pt denies any previous H/o diverticulitis before       Overview/Hospital Course:  04/06  Abdominal pain better  Started on clears which pt is tolerating  No other issues    04/07  Pt much better and tolerating diet  Possible Home Tomorrow AM once iv abx has been arranged       Interval History:     Review of Systems   Constitutional:  Negative for activity change and appetite change.   HENT:  Negative for congestion and dental problem.    Eyes:  Negative for discharge and itching.   Respiratory:  Negative for shortness of breath.    Cardiovascular:  Negative for chest pain.   Gastrointestinal:  Negative for abdominal distention and abdominal pain.   Endocrine: Negative for cold intolerance.   Genitourinary:  Negative for difficulty urinating and dysuria.   Musculoskeletal:  Negative for arthralgias and back pain.   Skin:  Negative for color change.   Neurological:  Negative for dizziness and facial asymmetry.   Hematological:  Negative for adenopathy.   Psychiatric/Behavioral:  Negative for agitation and behavioral problems.    Objective:     Vital Signs (Most Recent):  Temp: 97.7 °F (36.5 °C) (04/07/22 1642)  Pulse:  86 (04/07/22 1642)  Resp: 18 (04/07/22 1642)  BP: (!) 145/73 (04/07/22 1642)  SpO2: 97 % (04/07/22 1642)   Vital Signs (24h Range):  Temp:  [97.5 °F (36.4 °C)-98.7 °F (37.1 °C)] 97.7 °F (36.5 °C)  Pulse:  [82-90] 86  Resp:  [17-18] 18  SpO2:  [97 %-98 %] 97 %  BP: (123-147)/(53-73) 145/73     Weight: 72.6 kg (160 lb 0.9 oz)  Body mass index is 26.63 kg/m².    Intake/Output Summary (Last 24 hours) at 4/7/2022 1711  Last data filed at 4/7/2022 1000  Gross per 24 hour   Intake 530 ml   Output 900 ml   Net -370 ml      Physical Exam  Vitals and nursing note reviewed.   Constitutional:       General: She is not in acute distress.  HENT:      Head: Atraumatic.      Right Ear: External ear normal.      Left Ear: External ear normal.      Nose: Nose normal.      Mouth/Throat:      Mouth: Mucous membranes are moist.   Eyes:      General: No scleral icterus.  Cardiovascular:      Rate and Rhythm: Normal rate.   Pulmonary:      Effort: Pulmonary effort is normal.   Abdominal:      General: There is no distension.   Musculoskeletal:         General: Normal range of motion.      Cervical back: Normal range of motion.   Skin:     General: Skin is warm.   Neurological:      Mental Status: She is alert and oriented to person, place, and time.   Psychiatric:         Behavior: Behavior normal.       Significant Labs: All pertinent labs within the past 24 hours have been reviewed.  CBC:   Recent Labs   Lab 04/06/22 0454 04/07/22 0450   WBC 14.98* 13.13*   HGB 11.4* 11.2*   HCT 35.1* 35.6*    232     CMP:   Recent Labs   Lab 04/06/22 0454 04/07/22 0450    139   K 3.7 3.5    103   CO2 24 23   * 119*   BUN 14 9   CREATININE 0.8 0.6   CALCIUM 9.4 9.5   PROT 6.8 7.0   ALBUMIN 3.6 3.4*   BILITOT 1.1* 1.6*   ALKPHOS 69 72   AST 13 13   ALT 14 13   ANIONGAP 9 13   EGFRNONAA >60.0 >60.0       Significant Imaging: I have reviewed all pertinent imaging results/findings within the past 24 hours.      Assessment/Plan:       * Acute diverticulitis  Diverticulitis of the proximal sigmoid colon with evidence of microperforation  Started on  iv zosyn/iv Diflucan  Pt better after being on iv abx  SW/CM consulted for home iv abx ( iv invanz) via [facility] and can go home once these has been arranged  Medically stable     History of heart attack  In 2018  Maintain present regime      MS (multiple sclerosis)  H/o MS  Stable issue       VTE Risk Mitigation (From admission, onward)         Ordered     IP VTE HIGH RISK PATIENT  Once         04/05/22 1005     Place sequential compression device  Until discontinued         04/05/22 1005                Discharge Planning   JAYSHREE: 4/8/2022     Code Status: Full Code   Is the patient medically ready for discharge?: Yes    Reason for patient still in hospital (select all that apply): Other (specify) awaiting home iv abx infusion   Discharge Plan A: Home   Discharge Delays: Orders Needed              Clemente Vital MD  Department of Hospital Medicine   [facility]

## 2022-04-07 NOTE — PHYSICIAN QUERY
"PT Name: Tayla Ewing  MR #: [id number]    DOCUMENTATION CLARIFICATION     CDS/: Norma Rodgers               Contact information: [phone]  This form is a permanent document in the medical record.    Query Date: April 7, 2022      By submitting this query, we are merely seeking further clarification of documentation.  Please utilize your independent clinical judgment when addressing the question(s) below.    The Medical Record reflects the following:    Clinical Information Location in Medical Record   Risk factors; 76yo female presents w/ cramping lower abd pain.    Admitted w/ Acute diverticulitis of sigmoid colon w/ microperforation as evidenced by CT findings.   ED    Admission orders   Clinical indicators;  "Acute diverticulitis- Leukocytosis, abdominal pain, tenderness; clinical picture fits ct findings of diverticulitis with micro perforation- clinically with localized peritonitis."    WBC 15.30 > 14.98 > 13.13     General surgery consult 04/05        Labs   Interventions;  "SW/CM consulted for home iv abx ( iv invanz) via [facility]"    Flagyl 500mg IV x1 (ED)  Levaquin 750mg IV x1 (ED)  Zosyn 3.375g IV Q8 hrs (04/05 to current)  Diflucan 100mg IV Q24 hrs (04/05 to current)     Hospitalist PN 04/06    MAR       Dear doctor please document if you agree w/ General Surgery dx of "localized peritonitis."     [ X ] "localized peritonitis" ruled in     [  ] "localized peritonitis" ruled out     [  ] Other (please specify)               "

## 2022-04-07 NOTE — ASSESSMENT & PLAN NOTE
Diverticulitis of the proximal sigmoid colon with evidence of microperforation  Started on  iv zosyn/iv Diflucan  Pt better after being on iv abx  SW/CM consulted for home iv abx ( iv invanz) via [facility]

## 2022-04-07 NOTE — SUBJECTIVE & OBJECTIVE
Interval History:     Review of Systems   Constitutional:  Negative for activity change and appetite change.   HENT:  Negative for congestion and dental problem.    Eyes:  Negative for discharge and itching.   Respiratory:  Negative for shortness of breath.    Cardiovascular:  Negative for chest pain.   Gastrointestinal:  Negative for abdominal distention and abdominal pain.   Endocrine: Negative for cold intolerance.   Genitourinary:  Negative for difficulty urinating and dysuria.   Musculoskeletal:  Negative for arthralgias and back pain.   Skin:  Negative for color change.   Neurological:  Negative for dizziness and facial asymmetry.   Hematological:  Negative for adenopathy.   Psychiatric/Behavioral:  Negative for agitation and behavioral problems.    Objective:     Vital Signs (Most Recent):  Temp: 97.7 °F (36.5 °C) (04/07/22 1642)  Pulse: 86 (04/07/22 1642)  Resp: 18 (04/07/22 1642)  BP: (!) 145/73 (04/07/22 1642)  SpO2: 97 % (04/07/22 1642)   Vital Signs (24h Range):  Temp:  [97.5 °F (36.4 °C)-98.7 °F (37.1 °C)] 97.7 °F (36.5 °C)  Pulse:  [82-90] 86  Resp:  [17-18] 18  SpO2:  [97 %-98 %] 97 %  BP: (123-147)/(53-73) 145/73     Weight: 72.6 kg (160 lb 0.9 oz)  Body mass index is 26.63 kg/m².    Intake/Output Summary (Last 24 hours) at 4/7/2022 1711  Last data filed at 4/7/2022 1000  Gross per 24 hour   Intake 530 ml   Output 900 ml   Net -370 ml      Physical Exam  Vitals and nursing note reviewed.   Constitutional:       General: She is not in acute distress.  HENT:      Head: Atraumatic.      Right Ear: External ear normal.      Left Ear: External ear normal.      Nose: Nose normal.      Mouth/Throat:      Mouth: Mucous membranes are moist.   Eyes:      General: No scleral icterus.  Cardiovascular:      Rate and Rhythm: Normal rate.   Pulmonary:      Effort: Pulmonary effort is normal.   Abdominal:      General: There is no distension.   Musculoskeletal:         General: Normal range of motion.      Cervical  back: Normal range of motion.   Skin:     General: Skin is warm.   Neurological:      Mental Status: She is alert and oriented to person, place, and time.   Psychiatric:         Behavior: Behavior normal.       Significant Labs: All pertinent labs within the past 24 hours have been reviewed.  CBC:   Recent Labs   Lab 04/06/22 0454 04/07/22 0450   WBC 14.98* 13.13*   HGB 11.4* 11.2*   HCT 35.1* 35.6*    232     CMP:   Recent Labs   Lab 04/06/22 0454 04/07/22 0450    139   K 3.7 3.5    103   CO2 24 23   * 119*   BUN 14 9   CREATININE 0.8 0.6   CALCIUM 9.4 9.5   PROT 6.8 7.0   ALBUMIN 3.6 3.4*   BILITOT 1.1* 1.6*   ALKPHOS 69 72   AST 13 13   ALT 14 13   ANIONGAP 9 13   EGFRNONAA >60.0 >60.0       Significant Imaging: I have reviewed all pertinent imaging results/findings within the past 24 hours.

## 2022-04-07 NOTE — PROGRESS NOTES
[facility] Medicine  Progress Note    Patient Name: Tayla Ewing  MRN: [id number]  Patient Class: IP- Inpatient   Admission Date: 4/5/2022  Length of Stay: 1 days  Attending Physician: Clemente Vital MD  Primary Care Provider: Marcel Mueller MD        Subjective:     Principal Problem:Acute diverticulitis        HPI:  77 year old patient getting admitted with diverticulitis of the proximal sigmoid colon with microperforation  Pt started having Abdominal pain few days ago  Paon was mostly on Left quadrant area and later it spreaded all over abdomen  Pt thought she might had some problem with the food she ate along with gas in abdomen  Pain was persistent yesterday/Colicky with no aggravating and relieving factors  Today symptoms went worse and she came to hospital and got admitted  Pt denies any previous H/o diverticulitis before       Overview/Hospital Course:  04/06  Abdominal pain better  Started on clears which pt is tolerating  No other issues      Interval History:     Review of Systems   Constitutional:  Negative for activity change and appetite change.   HENT:  Negative for congestion and dental problem.    Eyes:  Negative for discharge and itching.   Respiratory:  Negative for shortness of breath.    Cardiovascular:  Negative for chest pain.   Gastrointestinal:  Negative for abdominal distention and abdominal pain.   Endocrine: Negative for cold intolerance.   Genitourinary:  Negative for difficulty urinating and dysuria.   Musculoskeletal:  Negative for arthralgias and back pain.   Skin:  Negative for color change.   Neurological:  Negative for dizziness and facial asymmetry.   Hematological:  Negative for adenopathy.   Psychiatric/Behavioral:  Negative for agitation and behavioral problems.    Objective:     Vital Signs (Most Recent):  Temp: 98.7 °F (37.1 °C) (04/06/22 1644)  Pulse: 97 (04/06/22 1644)  Resp: 18 (04/06/22 1644)  BP: (!) 147/59 (04/06/22 1644)  SpO2: 98 % (04/06/22  1644)   Vital Signs (24h Range):  Temp:  [98.2 °F (36.8 °C)-98.8 °F (37.1 °C)] 98.7 °F (37.1 °C)  Pulse:  [84-97] 97  Resp:  [16-18] 18  SpO2:  [96 %-98 %] 98 %  BP: (108-147)/(54-76) 147/59     Weight: 72.6 kg (160 lb 0.9 oz)  Body mass index is 26.63 kg/m².    Intake/Output Summary (Last 24 hours) at 4/6/2022 1928  Last data filed at 4/6/2022 1752  Gross per 24 hour   Intake 1921.67 ml   Output 200 ml   Net 1721.67 ml      Physical Exam  Vitals and nursing note reviewed.   Constitutional:       General: She is not in acute distress.  HENT:      Head: Atraumatic.      Right Ear: External ear normal.      Left Ear: External ear normal.      Nose: Nose normal.      Mouth/Throat:      Mouth: Mucous membranes are moist.   Eyes:      General: No scleral icterus.  Cardiovascular:      Rate and Rhythm: Normal rate.   Pulmonary:      Effort: Pulmonary effort is normal.   Abdominal:      General: There is no distension.   Musculoskeletal:         General: Normal range of motion.      Cervical back: Normal range of motion.   Skin:     General: Skin is warm.   Neurological:      Mental Status: She is alert and oriented to person, place, and time.   Psychiatric:         Behavior: Behavior normal.       Significant Labs: All pertinent labs within the past 24 hours have been reviewed.  CBC:   Recent Labs   Lab 04/05/22  0321 04/06/22  0454   WBC 15.30* 14.98*   HGB 12.4 11.4*   HCT 37.0 35.1*    219     CMP:   Recent Labs   Lab 04/05/22  0321 04/06/22  0454   * 141   K 3.7 3.7    108   CO2 21* 24   * 112*   BUN 20 14   CREATININE 0.8 0.8   CALCIUM 9.4 9.4   PROT 7.5 6.8   ALBUMIN 4.2 3.6   BILITOT 1.1* 1.1*   ALKPHOS 74 69   AST 20 13   ALT 18 14   ANIONGAP 10 9   EGFRNONAA >60.0 >60.0       Significant Imaging: I have reviewed all pertinent imaging results/findings within the past 24 hours.      Assessment/Plan:      * Acute diverticulitis  Diverticulitis of the proximal sigmoid colon with evidence of  microperforation  Started on  iv zosyn/iv Diflucan  Pt better after being on iv abx  SW/CM consulted for home iv abx ( iv invanz) via [facility]    History of heart attack  In 2018  Maintain present regime      MS (multiple sclerosis)  H/o MS  Stable issue         VTE Risk Mitigation (From admission, onward)         Ordered     IP VTE HIGH RISK PATIENT  Once         04/05/22 1005     Place sequential compression device  Until discontinued         04/05/22 1005                Discharge Planning   JAYSHREE: 4/7/2022     Code Status: Full Code   Is the patient medically ready for discharge?:     Reason for patient still in hospital (select all that apply): Treatment  Discharge Plan A: (P) Home with family                  Clemente Vital MD  Department of Hospital Medicine   [facility]

## 2022-04-07 NOTE — PLAN OF CARE
[facility]  Discharge Reassessment    Primary Care Provider: Marcel Mueller MD    Expected Discharge Date: 04/08/2022    Patient not medically ready for discharge today possible tomorrow.  Patient accepted by [facility] for home IV ABX.  CLAUDIO sent secure message to MD regarding possible home health and/or PICC line for patient.  CM will follow up for discharge planning needs.    Reassessment (most recent)     Discharge Reassessment - 04/07/22 1636        Discharge Reassessment    Assessment Type Discharge Planning Reassessment     Communicated JAYSHREE with patient/caregiver Yes     Discharge Plan A Home Health     Discharge Plan B Home with family     DME Needed Upon Discharge  none     Discharge Barriers Identified None     Why the patient remains in the hospital Requires continued medical care        Post-Acute Status    Post-Acute Authorization Home Health;IV Infusion     IV Infusion Status Awaiting delivery     Discharge Delays Orders Needed

## 2022-04-07 NOTE — SUBJECTIVE & OBJECTIVE
Interval History:     Review of Systems   Constitutional:  Negative for activity change and appetite change.   HENT:  Negative for congestion and dental problem.    Eyes:  Negative for discharge and itching.   Respiratory:  Negative for shortness of breath.    Cardiovascular:  Negative for chest pain.   Gastrointestinal:  Negative for abdominal distention and abdominal pain.   Endocrine: Negative for cold intolerance.   Genitourinary:  Negative for difficulty urinating and dysuria.   Musculoskeletal:  Negative for arthralgias and back pain.   Skin:  Negative for color change.   Neurological:  Negative for dizziness and facial asymmetry.   Hematological:  Negative for adenopathy.   Psychiatric/Behavioral:  Negative for agitation and behavioral problems.    Objective:     Vital Signs (Most Recent):  Temp: 98.7 °F (37.1 °C) (04/06/22 1644)  Pulse: 97 (04/06/22 1644)  Resp: 18 (04/06/22 1644)  BP: (!) 147/59 (04/06/22 1644)  SpO2: 98 % (04/06/22 1644)   Vital Signs (24h Range):  Temp:  [98.2 °F (36.8 °C)-98.8 °F (37.1 °C)] 98.7 °F (37.1 °C)  Pulse:  [84-97] 97  Resp:  [16-18] 18  SpO2:  [96 %-98 %] 98 %  BP: (108-147)/(54-76) 147/59     Weight: 72.6 kg (160 lb 0.9 oz)  Body mass index is 26.63 kg/m².    Intake/Output Summary (Last 24 hours) at 4/6/2022 1928  Last data filed at 4/6/2022 1752  Gross per 24 hour   Intake 1921.67 ml   Output 200 ml   Net 1721.67 ml      Physical Exam  Vitals and nursing note reviewed.   Constitutional:       General: She is not in acute distress.  HENT:      Head: Atraumatic.      Right Ear: External ear normal.      Left Ear: External ear normal.      Nose: Nose normal.      Mouth/Throat:      Mouth: Mucous membranes are moist.   Eyes:      General: No scleral icterus.  Cardiovascular:      Rate and Rhythm: Normal rate.   Pulmonary:      Effort: Pulmonary effort is normal.   Abdominal:      General: There is no distension.   Musculoskeletal:         General: Normal range of motion.       Cervical back: Normal range of motion.   Skin:     General: Skin is warm.   Neurological:      Mental Status: She is alert and oriented to person, place, and time.   Psychiatric:         Behavior: Behavior normal.       Significant Labs: All pertinent labs within the past 24 hours have been reviewed.  CBC:   Recent Labs   Lab 04/05/22 0321 04/06/22 0454   WBC 15.30* 14.98*   HGB 12.4 11.4*   HCT 37.0 35.1*    219     CMP:   Recent Labs   Lab 04/05/22 0321 04/06/22  0454   * 141   K 3.7 3.7    108   CO2 21* 24   * 112*   BUN 20 14   CREATININE 0.8 0.8   CALCIUM 9.4 9.4   PROT 7.5 6.8   ALBUMIN 4.2 3.6   BILITOT 1.1* 1.1*   ALKPHOS 74 69   AST 20 13   ALT 18 14   ANIONGAP 10 9   EGFRNONAA >60.0 >60.0       Significant Imaging: I have reviewed all pertinent imaging results/findings within the past 24 hours.

## 2022-04-07 NOTE — PLAN OF CARE
11:26am CLAUDIO faxed patient information to [facility] [phone].  Fax confirmation received.  CLAUDIO spoke to Kassidy with [facility] [phone] regarding home IV Abx consult.    2:00pm CLAUDIO received call from Kassidy stating they are not in network with patient's secondary insurance.  CLAUDIO sent patient information to [facility].       04/07/22 1126   Post-Acute Status   Post-Acute Authorization IV Infusion   IV Infusion Status Referral(s) sent

## 2022-04-07 NOTE — PROGRESS NOTES
Doing well  Pain much better  Abdomen soft  No more tenderness  A/P  Diet as tolerated  Ok to dc if continue to improve

## 2022-04-07 NOTE — PLAN OF CARE
Per MD via secure message, patient has [facility] and family can manage home IV ABX.  CM following for discharge planning needs.       04/07/22 1640   Post-Acute Status   Post-Acute Authorization IV Infusion   Discharge Plan   Discharge Plan A Home   Discharge Plan B Home with family

## 2022-04-07 NOTE — ASSESSMENT & PLAN NOTE
Diverticulitis of the proximal sigmoid colon with evidence of microperforation  Started on  iv zosyn/iv Diflucan  Pt better after being on iv abx  SW/CM consulted for home iv abx ( iv invanz) via [facility] and can go home once these has been arranged  Medically stable

## 2022-04-07 NOTE — HOSPITAL COURSE
Pt admitted with Acute diverticulitis  Imaging showed Diverticulitis of the proximal sigmoid colon with evidence of microperforation  Pt was Started on  iv zosyn/iv Diflucan  Pt was evaluated by Surgical team  Pts Condition got better and she tolerated bland soft diet  Later pt was discharged to home with home iv abx(iv invanz for 10 days)  She will follow up with PCP/Surgeon as an OP

## 2022-04-08 ENCOUNTER — TELEPHONE (OUTPATIENT)
Dept: SURGERY | Facility: CLINIC | Age: 77
End: 2022-04-08
Payer: MEDICARE

## 2022-04-08 VITALS
OXYGEN SATURATION: 98 % | RESPIRATION RATE: 18 BRPM | WEIGHT: 160.06 LBS | BODY MASS INDEX: 26.67 KG/M2 | TEMPERATURE: 98 F | SYSTOLIC BLOOD PRESSURE: 143 MMHG | HEART RATE: 79 BPM | DIASTOLIC BLOOD PRESSURE: 89 MMHG | HEIGHT: 65 IN

## 2022-04-08 PROBLEM — K57.92 ACUTE DIVERTICULITIS: Status: RESOLVED | Noted: 2022-04-05 | Resolved: 2022-04-08

## 2022-04-08 LAB
ALBUMIN SERPL BCP-MCNC: 3.1 G/DL (ref 3.5–5.2)
ALP SERPL-CCNC: 66 U/L (ref 55–135)
ALT SERPL W/O P-5'-P-CCNC: 19 U/L (ref 10–44)
ANION GAP SERPL CALC-SCNC: 12 MMOL/L (ref 8–16)
AST SERPL-CCNC: 19 U/L (ref 10–40)
BILIRUB SERPL-MCNC: 1.1 MG/DL (ref 0.1–1)
BUN SERPL-MCNC: 8 MG/DL (ref 8–23)
CALCIUM SERPL-MCNC: 9 MG/DL (ref 8.7–10.5)
CHLORIDE SERPL-SCNC: 105 MMOL/L (ref 95–110)
CO2 SERPL-SCNC: 21 MMOL/L (ref 23–29)
CREAT SERPL-MCNC: 0.7 MG/DL (ref 0.5–1.4)
ERYTHROCYTE [DISTWIDTH] IN BLOOD BY AUTOMATED COUNT: 13.5 % (ref 11.5–14.5)
EST. GFR  (AFRICAN AMERICAN): >60 ML/MIN/1.73 M^2
EST. GFR  (NON AFRICAN AMERICAN): >60 ML/MIN/1.73 M^2
GLUCOSE SERPL-MCNC: 110 MG/DL (ref 70–110)
HCT VFR BLD AUTO: 32.4 % (ref 37–48.5)
HGB BLD-MCNC: 10.5 G/DL (ref 12–16)
MCH RBC QN AUTO: 30 PG (ref 27–31)
MCHC RBC AUTO-ENTMCNC: 32.4 G/DL (ref 32–36)
MCV RBC AUTO: 93 FL (ref 82–98)
PLATELET # BLD AUTO: 226 K/UL (ref 150–450)
PMV BLD AUTO: 10.4 FL (ref 9.2–12.9)
POTASSIUM SERPL-SCNC: 3.5 MMOL/L (ref 3.5–5.1)
PROT SERPL-MCNC: 6.5 G/DL (ref 6–8.4)
RBC # BLD AUTO: 3.5 M/UL (ref 4–5.4)
SODIUM SERPL-SCNC: 138 MMOL/L (ref 136–145)
WBC # BLD AUTO: 9.3 K/UL (ref 3.9–12.7)

## 2022-04-08 PROCEDURE — 36415 COLL VENOUS BLD VENIPUNCTURE: CPT | Performed by: INTERNAL MEDICINE

## 2022-04-08 PROCEDURE — 85027 COMPLETE CBC AUTOMATED: CPT | Performed by: INTERNAL MEDICINE

## 2022-04-08 PROCEDURE — 80053 COMPREHEN METABOLIC PANEL: CPT | Performed by: INTERNAL MEDICINE

## 2022-04-08 PROCEDURE — 63600175 PHARM REV CODE 636 W HCPCS: Performed by: INTERNAL MEDICINE

## 2022-04-08 PROCEDURE — 25000003 PHARM REV CODE 250: Performed by: INTERNAL MEDICINE

## 2022-04-08 RX ORDER — HYDROCODONE BITARTRATE AND ACETAMINOPHEN 5; 325 MG/1; MG/1
1 TABLET ORAL EVERY 12 HOURS PRN
Qty: 10 TABLET | Refills: 0 | Status: SHIPPED | OUTPATIENT
Start: 2022-04-08 | End: 2022-05-31

## 2022-04-08 RX ADMIN — ASPIRIN 81 MG: 81 TABLET, DELAYED RELEASE ORAL at 08:04

## 2022-04-08 RX ADMIN — ATORVASTATIN CALCIUM 80 MG: 40 TABLET, FILM COATED ORAL at 08:04

## 2022-04-08 RX ADMIN — FAMOTIDINE 20 MG: 20 TABLET, FILM COATED ORAL at 08:04

## 2022-04-08 RX ADMIN — PIPERACILLIN SODIUM AND TAZOBACTAM SODIUM 3.38 G: 3; .375 INJECTION, POWDER, LYOPHILIZED, FOR SOLUTION INTRAVENOUS at 06:04

## 2022-04-08 RX ADMIN — TICAGRELOR 90 MG: 90 TABLET ORAL at 08:04

## 2022-04-08 NOTE — DISCHARGE SUMMARY
[facility] Medicine  Discharge Summary      Patient Name: Tayla Ewing  MRN: [id number]  Patient Class: IP- Inpatient  Admission Date: 4/5/2022  Hospital Length of Stay: 3 days  Discharge Date and Time:  04/08/2022 12:57 PM  Attending Physician: Clemente Vital MD   Discharging Provider: Clemente Vital MD  Primary Care Provider: Marcel Mueller MD      HPI:   77 year old patient getting admitted with diverticulitis of the proximal sigmoid colon with microperforation  Pt started having Abdominal pain few days ago  Paon was mostly on Left quadrant area and later it spreaded all over abdomen  Pt thought she might had some problem with the food she ate along with gas in abdomen  Pain was persistent yesterday/Colicky with no aggravating and relieving factors  Today symptoms went worse and she came to hospital and got admitted  Pt denies any previous H/o diverticulitis before       * No surgery found *      Hospital Course:   Pt admitted with Acute diverticulitis  Imaging showed Diverticulitis of the proximal sigmoid colon with evidence of microperforation  Pt was Started on  iv zosyn/iv Diflucan  Pt was evaluated by Surgical team  Pts Condition got better and she tolerated bland soft diet  Later pt was discharged to home with home iv abx(iv invanz for 10 days)  She will follow up with PCP/Surgeon as an OP        Goals of Care Treatment Preferences:  Code Status: Full Code      Consults:   Consults (From admission, onward)        Status Ordering Provider     Inpatient consult to   Once        Provider:  (Not yet assigned)    CLEMENTE Dong     Inpatient consult to General Surgery  Once        Provider:  Chris Briscoe MD    Completed CLEMENTE VITAL     Inpatient consult to Hospitalist  Once        Provider:  Clemente Vital MD    Acknowledged CLEMENTE VITAL          No new Assessment & Plan notes have been filed under this hospital service since the last note was generated.  Service: Hospital  Medicine    Final Active Diagnoses:    Diagnosis Date Noted POA    History of heart attack [I25.2] 09/06/2018 Not Applicable    MS (multiple sclerosis) [G35] 12/12/2014 Yes      Problems Resolved During this Admission:    Diagnosis Date Noted Date Resolved POA    PRINCIPAL PROBLEM:  Acute diverticulitis [K57.92] 04/05/2022 04/08/2022 Yes       Discharged Condition: good    Disposition: Home or Self Care    Follow Up:   Follow-up Information     Chris Briscoe MD Follow up in 1 week(s).    Specialties: General Surgery, Bariatrics, Surgery  Why: office to contact patient to schedule hospital follow up  Contact information:  [address]  [address]  [place] [address]  [phone]                       Patient Instructions:      Diet Dysphagia Mechanical Soft       Significant Diagnostic Studies: Labs:   CMP   Recent Labs   Lab 04/07/22  0450 04/08/22  0426    138   K 3.5 3.5    105   CO2 23 21*   * 110   BUN 9 8   CREATININE 0.6 0.7   CALCIUM 9.5 9.0   PROT 7.0 6.5   ALBUMIN 3.4* 3.1*   BILITOT 1.6* 1.1*   ALKPHOS 72 66   AST 13 19   ALT 13 19   ANIONGAP 13 12   ESTGFRAFRICA >60.0 >60.0   EGFRNONAA >60.0 >60.0    and CBC   Recent Labs   Lab 04/07/22  0450 04/08/22  0427   WBC 13.13* 9.30   HGB 11.2* 10.5*   HCT 35.6* 32.4*    226       Pending Diagnostic Studies:     None         Medications:  Reconciled Home Medications:      Medication List      START taking these medications    ERTAPENEM (INVANZ) 1 G/100 ML NS (READY TO MIX)  Inject 100 mLs (1 g total) into the vein once daily. for 10 days     HYDROcodone-acetaminophen 5-325 mg per tablet  Commonly known as: NORCO  Take 1 tablet by mouth every 12 (twelve) hours as needed for Pain.        CHANGE how you take these medications    tiZANidine 2 mg Cap  TAKE 1 TO 2 CAPSULES BY MOUTH EVERY NIGHT AT BEDTIME AS NEEDED  What changed: See the new instructions.        CONTINUE taking these medications    amLODIPine 5 MG tablet  Commonly known  as: NORVASC  Take 5 mg by mouth once daily.     aspirin 81 MG EC tablet  Commonly known as: ECOTRIN  Take 81 mg by mouth once daily.     atorvastatin 80 MG tablet  Commonly known as: LIPITOR  Take 80 mg by mouth once daily.     b complex vitamins tablet  Take 1 tablet by mouth once daily.     BRILINTA 90 mg tablet  Generic drug: ticagrelor  Take 90 mg by mouth 2 (two) times daily.     carvediloL 6.25 MG tablet  Commonly known as: COREG  Take 6.25 mg by mouth 2 (two) times daily.     cholecalciferol (vitamin D3) 125 mcg (5,000 unit) Tab  Take 5,000 Units by mouth once daily.     gabapentin 100 MG capsule  Commonly known as: NEURONTIN  Take 1 capsule (100 mg total) by mouth 3 (three) times daily.     olmesartan-hydrochlorothiazide 20-12.5 mg per tablet  Commonly known as: BENICAR HCT  Take 1 tablet by mouth once daily.     pantoprazole 40 MG tablet  Commonly known as: PROTONIX  Take 40 mg by mouth once daily.     pulse oximeter device  Commonly known as: pulse oximeter  Use twice daily at 8 AM and 3 PM and record the value in [facility] as directed.     vitamin E 1000 UNIT capsule  Take 1,000 Units by mouth once daily.            Indwelling Lines/Drains at time of discharge:   Lines/Drains/Airways     None               Physical Exam   Cardiovascular: Normal rate.   Neurological: She is alert.   Time spent on the discharge of patient: 44 minutes         Clemente Vital MD  Department of Hospital Medicine  [facility]

## 2022-04-08 NOTE — PLAN OF CARE
Patient cleared for discharge from case management standpoint.       04/08/22 1216   Final Note   Assessment Type Final Discharge Note   Anticipated Discharge Disposition IV Therapy   Hospital Resources/Appts/Education Provided Appointments scheduled and added to AVS;Provided patient/caregiver with written discharge plan information;Provided education on problems/symptoms using teachback;Post-Acute resouces added to AVS

## 2022-04-08 NOTE — TELEPHONE ENCOUNTER
----- Message from Rona Ziegler sent at 4/8/2022 12:15 PM CDT -----  Contact: Philipp NOLAN  [facility], calling to schedule patients 1 wk f/u    Type:  Sooner Appointment Request    Caller is requesting a sooner appointment.  Caller declined first available appointment listed below.  Caller will not accept being placed on the waitlist and is requesting a message be sent to doctor.    Name of Caller:  Patient   When is the first available appointment?  N/a  Symptoms:  1 wk Hospital f/u  Best Call Back Number:  [phone] (home)   Additional Information:  Thank You, pt being released and needs a f/u appt.

## 2022-04-11 ENCOUNTER — TELEPHONE (OUTPATIENT)
Dept: SURGERY | Facility: CLINIC | Age: 77
End: 2022-04-11
Payer: MEDICARE

## 2022-04-11 LAB — BACTERIA BLD CULT: NORMAL

## 2022-04-11 NOTE — PLAN OF CARE
Followed up with [facility] to confirm that patient is set up and to find out start of service. Pt does not have a start of service date but they are in the system and I was advised by Lauren that pt is still in the intake process and will be followed up with shortly.        04/11/22 0940   Final Note   Assessment Type Final Discharge Note   Anticipated Discharge Disposition IV Therapy   Hospital Resources/Appts/Education Provided Appointments scheduled and added to AVS;Post-Acute resouces added to AVS;Provided education on problems/symptoms using teachback;Provided patient/caregiver with written discharge plan information   Post-Acute Status   Post-Acute Authorization IV Infusion   IV Infusion Status Awaiting delivery   Discharge Delays None known at this time

## 2022-04-11 NOTE — TELEPHONE ENCOUNTER
----- Message from Sherita Martínez sent at 4/11/2022  9:12 AM CDT -----  Type:  Sooner Appointment Request    Caller is requesting a sooner appointment.  Caller declined first available appointment listed below.  Caller will not accept being placed on the waitlist and is requesting a message be sent to doctor.    Name of Caller:  Pt  When is the first available appointment?  NA  Symptoms:  Pt is asking to schedule follow up from after being in hospital   Best Call Back Number:  [phone]  Additional Information:  Please call and schedule

## 2022-04-14 ENCOUNTER — OFFICE VISIT (OUTPATIENT)
Dept: SURGERY | Facility: CLINIC | Age: 77
End: 2022-04-14
Payer: MEDICARE

## 2022-04-14 VITALS
RESPIRATION RATE: 16 BRPM | HEIGHT: 65 IN | SYSTOLIC BLOOD PRESSURE: 164 MMHG | TEMPERATURE: 98 F | WEIGHT: 158.31 LBS | BODY MASS INDEX: 26.38 KG/M2 | DIASTOLIC BLOOD PRESSURE: 72 MMHG | HEART RATE: 63 BPM

## 2022-04-14 DIAGNOSIS — K57.20 DIVERTICULITIS OF LARGE INTESTINE WITH PERFORATION WITHOUT ABSCESS OR BLEEDING: Primary | ICD-10-CM

## 2022-04-14 PROCEDURE — 99999 PR PBB SHADOW E&M-EST. PATIENT-LVL V: CPT | Mod: PBBFAC,,, | Performed by: SURGERY

## 2022-04-14 PROCEDURE — 99999 PR PBB SHADOW E&M-EST. PATIENT-LVL V: ICD-10-PCS | Mod: PBBFAC,,, | Performed by: SURGERY

## 2022-04-14 PROCEDURE — 99213 PR OFFICE/OUTPT VISIT, EST, LEVL III, 20-29 MIN: ICD-10-PCS | Mod: S$PBB,,, | Performed by: SURGERY

## 2022-04-14 PROCEDURE — 99215 OFFICE O/P EST HI 40 MIN: CPT | Mod: PBBFAC,PN | Performed by: SURGERY

## 2022-04-14 PROCEDURE — 99213 OFFICE O/P EST LOW 20 MIN: CPT | Mod: S$PBB,,, | Performed by: SURGERY

## 2022-04-14 RX ORDER — AZELASTINE 1 MG/ML
SPRAY, METERED NASAL
COMMUNITY
Start: 2022-01-17 | End: 2022-05-31

## 2022-04-14 RX ORDER — ROSUVASTATIN CALCIUM 10 MG/1
10 TABLET, FILM COATED ORAL DAILY
COMMUNITY
Start: 2022-03-31

## 2022-04-14 RX ORDER — CEFDINIR 300 MG/1
300 CAPSULE ORAL 2 TIMES DAILY
COMMUNITY
Start: 2021-10-17 | End: 2022-05-31

## 2022-04-14 RX ORDER — ROSUVASTATIN CALCIUM 5 MG
5 TABLET ORAL DAILY
COMMUNITY
Start: 2022-03-31 | End: 2022-05-31

## 2022-04-14 RX ORDER — FLUTICASONE PROPIONATE 50 MCG
1-2 SPRAY, SUSPENSION (ML) NASAL DAILY
COMMUNITY
Start: 2022-01-21

## 2022-04-14 RX ORDER — CIPROFLOXACIN 500 MG/1
500 TABLET ORAL 2 TIMES DAILY
Status: ON HOLD | COMMUNITY
Start: 2022-01-17 | End: 2022-07-20 | Stop reason: SDUPTHER

## 2022-04-14 RX ORDER — EZETIMIBE 10 MG/1
10 TABLET ORAL DAILY
COMMUNITY
Start: 2022-03-31

## 2022-04-14 NOTE — PROGRESS NOTES
Follow-up from hospital for diverticulitis.  No more pain.  Getting IV antibiotics through a PICC line.    Vitals reviewed  Abdomen soft benign no tenderness  PICC line is in place  Assessment plan diverticulitis  Follow-up with Gastroenterology  I will pull picc when she is finished with antibiotics.  She has at least 1 more dose.

## 2022-04-19 ENCOUNTER — OFFICE VISIT (OUTPATIENT)
Dept: SURGERY | Facility: CLINIC | Age: 77
End: 2022-04-19
Payer: MEDICARE

## 2022-04-19 ENCOUNTER — TELEPHONE (OUTPATIENT)
Dept: SURGERY | Facility: CLINIC | Age: 77
End: 2022-04-19

## 2022-04-19 VITALS
DIASTOLIC BLOOD PRESSURE: 70 MMHG | SYSTOLIC BLOOD PRESSURE: 162 MMHG | HEIGHT: 65 IN | BODY MASS INDEX: 11.88 KG/M2 | RESPIRATION RATE: 16 BRPM | HEART RATE: 78 BPM | WEIGHT: 71.31 LBS | TEMPERATURE: 98 F

## 2022-04-19 DIAGNOSIS — K57.20 DIVERTICULITIS OF LARGE INTESTINE WITH PERFORATION WITHOUT ABSCESS OR BLEEDING: Primary | ICD-10-CM

## 2022-04-19 PROCEDURE — 99213 OFFICE O/P EST LOW 20 MIN: CPT | Mod: S$PBB,,, | Performed by: SURGERY

## 2022-04-19 PROCEDURE — 99999 PR PBB SHADOW E&M-EST. PATIENT-LVL IV: CPT | Mod: PBBFAC,,, | Performed by: SURGERY

## 2022-04-19 PROCEDURE — 99213 PR OFFICE/OUTPT VISIT, EST, LEVL III, 20-29 MIN: ICD-10-PCS | Mod: S$PBB,,, | Performed by: SURGERY

## 2022-04-19 PROCEDURE — 99214 OFFICE O/P EST MOD 30 MIN: CPT | Mod: PBBFAC,PN | Performed by: SURGERY

## 2022-04-19 PROCEDURE — 99999 PR PBB SHADOW E&M-EST. PATIENT-LVL IV: ICD-10-PCS | Mod: PBBFAC,,, | Performed by: SURGERY

## 2022-04-19 NOTE — PROGRESS NOTES
Doing well abdominal pain nearly resolved.  Still having some problems with constipation but start taking stool softeners.  Planning to see gastroenterology    Vitals:    04/19/22 0918   BP: (!) 162/70   Pulse: 78   Resp: 16   Temp: 98.3 °F (36.8 °C)       Abdomen is benign no real complaints    Midline is then placed in the left upper extremity.  This was easily removed and bandage.  Catheter removed intact     Assessment plan  Diverticulitis  Finished course of antibiotics  Clinically resolved  Follow-up gastroenterology  Follow-up with me as needed unlikely need for surgery  Remove bandage in 24-48 hrs

## 2022-05-28 ENCOUNTER — PATIENT MESSAGE (OUTPATIENT)
Dept: PRIMARY CARE CLINIC | Facility: CLINIC | Age: 77
End: 2022-05-28
Payer: MEDICARE

## 2022-05-31 ENCOUNTER — TELEPHONE (OUTPATIENT)
Dept: PRIMARY CARE CLINIC | Facility: CLINIC | Age: 77
End: 2022-05-31
Payer: MEDICARE

## 2022-05-31 ENCOUNTER — HOSPITAL ENCOUNTER (INPATIENT)
Facility: HOSPITAL | Age: 77
LOS: 8 days | Discharge: HOME HEALTH | DRG: 392 | End: 2022-06-08
Attending: EMERGENCY MEDICINE | Admitting: INTERNAL MEDICINE
Payer: MEDICARE

## 2022-05-31 DIAGNOSIS — K57.92 DIVERTICULITIS: ICD-10-CM

## 2022-05-31 DIAGNOSIS — K65.1 INTRA-ABDOMINAL ABSCESS: ICD-10-CM

## 2022-05-31 DIAGNOSIS — U07.1 COVID-19 VIRUS INFECTION: ICD-10-CM

## 2022-05-31 DIAGNOSIS — R07.9 CHEST PAIN: ICD-10-CM

## 2022-05-31 PROBLEM — Z87.19 HISTORY OF DIVERTICULITIS OF COLON: Status: ACTIVE | Noted: 2022-05-31

## 2022-05-31 LAB
ALBUMIN SERPL BCP-MCNC: 4 G/DL (ref 3.5–5.2)
ALP SERPL-CCNC: 77 U/L (ref 55–135)
ALT SERPL W/O P-5'-P-CCNC: 22 U/L (ref 10–44)
ANION GAP SERPL CALC-SCNC: 10 MMOL/L (ref 8–16)
AST SERPL-CCNC: 19 U/L (ref 10–40)
BASOPHILS # BLD AUTO: 0.05 K/UL (ref 0–0.2)
BASOPHILS NFR BLD: 0.4 % (ref 0–1.9)
BILIRUB SERPL-MCNC: 0.7 MG/DL (ref 0.1–1)
BILIRUB UR QL STRIP: NEGATIVE
BUN SERPL-MCNC: 14 MG/DL (ref 8–23)
CALCIUM SERPL-MCNC: 9.4 MG/DL (ref 8.7–10.5)
CHLORIDE SERPL-SCNC: 100 MMOL/L (ref 95–110)
CLARITY UR: CLEAR
CO2 SERPL-SCNC: 22 MMOL/L (ref 23–29)
COLOR UR: YELLOW
CREAT SERPL-MCNC: 0.7 MG/DL (ref 0.5–1.4)
DIFFERENTIAL METHOD: ABNORMAL
EOSINOPHIL # BLD AUTO: 0.1 K/UL (ref 0–0.5)
EOSINOPHIL NFR BLD: 0.9 % (ref 0–8)
ERYTHROCYTE [DISTWIDTH] IN BLOOD BY AUTOMATED COUNT: 12.6 % (ref 11.5–14.5)
EST. GFR  (AFRICAN AMERICAN): >60 ML/MIN/1.73 M^2
EST. GFR  (NON AFRICAN AMERICAN): >60 ML/MIN/1.73 M^2
GLUCOSE SERPL-MCNC: 103 MG/DL (ref 70–110)
GLUCOSE SERPL-MCNC: 104 MG/DL (ref 70–110)
GLUCOSE UR QL STRIP: NEGATIVE
HCT VFR BLD AUTO: 33.9 % (ref 37–48.5)
HGB BLD-MCNC: 11.7 G/DL (ref 12–16)
HGB UR QL STRIP: NEGATIVE
IMM GRANULOCYTES # BLD AUTO: 0.06 K/UL (ref 0–0.04)
IMM GRANULOCYTES NFR BLD AUTO: 0.5 % (ref 0–0.5)
KETONES UR QL STRIP: NEGATIVE
LACTATE SERPL-SCNC: 1.1 MMOL/L (ref 0.5–1.9)
LEUKOCYTE ESTERASE UR QL STRIP: NEGATIVE
LYMPHOCYTES # BLD AUTO: 2.4 K/UL (ref 1–4.8)
LYMPHOCYTES NFR BLD: 18.2 % (ref 18–48)
MCH RBC QN AUTO: 29.6 PG (ref 27–31)
MCHC RBC AUTO-ENTMCNC: 34.5 G/DL (ref 32–36)
MCV RBC AUTO: 86 FL (ref 82–98)
MONOCYTES # BLD AUTO: 0.8 K/UL (ref 0.3–1)
MONOCYTES NFR BLD: 6 % (ref 4–15)
NEUTROPHILS # BLD AUTO: 9.6 K/UL (ref 1.8–7.7)
NEUTROPHILS NFR BLD: 74 % (ref 38–73)
NITRITE UR QL STRIP: NEGATIVE
NRBC BLD-RTO: 0 /100 WBC
PH UR STRIP: 6 [PH] (ref 5–8)
PLATELET # BLD AUTO: 333 K/UL (ref 150–450)
PMV BLD AUTO: 9 FL (ref 9.2–12.9)
POTASSIUM SERPL-SCNC: 3.5 MMOL/L (ref 3.5–5.1)
PROT SERPL-MCNC: 7.9 G/DL (ref 6–8.4)
PROT UR QL STRIP: NEGATIVE
RBC # BLD AUTO: 3.95 M/UL (ref 4–5.4)
SARS-COV-2 RDRP RESP QL NAA+PROBE: NEGATIVE
SODIUM SERPL-SCNC: 132 MMOL/L (ref 136–145)
SP GR UR STRIP: 1.01 (ref 1–1.03)
URN SPEC COLLECT METH UR: NORMAL
UROBILINOGEN UR STRIP-ACNC: NEGATIVE EU/DL
WBC # BLD AUTO: 12.94 K/UL (ref 3.9–12.7)

## 2022-05-31 PROCEDURE — U0002 COVID-19 LAB TEST NON-CDC: HCPCS | Performed by: INTERNAL MEDICINE

## 2022-05-31 PROCEDURE — 63600175 PHARM REV CODE 636 W HCPCS: Performed by: INTERNAL MEDICINE

## 2022-05-31 PROCEDURE — 87040 BLOOD CULTURE FOR BACTERIA: CPT | Mod: 59 | Performed by: STUDENT IN AN ORGANIZED HEALTH CARE EDUCATION/TRAINING PROGRAM

## 2022-05-31 PROCEDURE — 83605 ASSAY OF LACTIC ACID: CPT | Performed by: STUDENT IN AN ORGANIZED HEALTH CARE EDUCATION/TRAINING PROGRAM

## 2022-05-31 PROCEDURE — 99285 EMERGENCY DEPT VISIT HI MDM: CPT | Mod: 25

## 2022-05-31 PROCEDURE — 81003 URINALYSIS AUTO W/O SCOPE: CPT | Performed by: NURSE PRACTITIONER

## 2022-05-31 PROCEDURE — 82962 GLUCOSE BLOOD TEST: CPT

## 2022-05-31 PROCEDURE — 96365 THER/PROPH/DIAG IV INF INIT: CPT

## 2022-05-31 PROCEDURE — 85025 COMPLETE CBC W/AUTO DIFF WBC: CPT | Performed by: NURSE PRACTITIONER

## 2022-05-31 PROCEDURE — 80053 COMPREHEN METABOLIC PANEL: CPT | Performed by: NURSE PRACTITIONER

## 2022-05-31 PROCEDURE — 12000002 HC ACUTE/MED SURGE SEMI-PRIVATE ROOM

## 2022-05-31 PROCEDURE — 25000003 PHARM REV CODE 250: Performed by: STUDENT IN AN ORGANIZED HEALTH CARE EDUCATION/TRAINING PROGRAM

## 2022-05-31 PROCEDURE — 63600175 PHARM REV CODE 636 W HCPCS: Performed by: STUDENT IN AN ORGANIZED HEALTH CARE EDUCATION/TRAINING PROGRAM

## 2022-05-31 RX ORDER — ASPIRIN 81 MG/1
81 TABLET ORAL DAILY
Status: DISCONTINUED | OUTPATIENT
Start: 2022-06-01 | End: 2022-06-01

## 2022-05-31 RX ORDER — GABAPENTIN 100 MG/1
100 CAPSULE ORAL 3 TIMES DAILY PRN
Status: DISCONTINUED | OUTPATIENT
Start: 2022-05-31 | End: 2022-06-08 | Stop reason: HOSPADM

## 2022-05-31 RX ORDER — ONDANSETRON 2 MG/ML
4 INJECTION INTRAMUSCULAR; INTRAVENOUS EVERY 8 HOURS PRN
Status: DISCONTINUED | OUTPATIENT
Start: 2022-05-31 | End: 2022-06-08 | Stop reason: HOSPADM

## 2022-05-31 RX ORDER — TALC
6 POWDER (GRAM) TOPICAL NIGHTLY PRN
Status: DISCONTINUED | OUTPATIENT
Start: 2022-05-31 | End: 2022-06-08 | Stop reason: HOSPADM

## 2022-05-31 RX ORDER — EZETIMIBE 10 MG/1
10 TABLET ORAL DAILY
Status: DISCONTINUED | OUTPATIENT
Start: 2022-06-01 | End: 2022-06-08 | Stop reason: HOSPADM

## 2022-05-31 RX ORDER — ENOXAPARIN SODIUM 100 MG/ML
40 INJECTION SUBCUTANEOUS EVERY 24 HOURS
Status: DISCONTINUED | OUTPATIENT
Start: 2022-05-31 | End: 2022-06-03

## 2022-05-31 RX ORDER — HYDROCHLOROTHIAZIDE 12.5 MG/1
12.5 TABLET ORAL DAILY
Status: DISCONTINUED | OUTPATIENT
Start: 2022-06-01 | End: 2022-06-08 | Stop reason: HOSPADM

## 2022-05-31 RX ORDER — TIZANIDINE 2 MG/1
2 TABLET ORAL NIGHTLY PRN
Status: DISCONTINUED | OUTPATIENT
Start: 2022-05-31 | End: 2022-06-08 | Stop reason: HOSPADM

## 2022-05-31 RX ORDER — CARVEDILOL 6.25 MG/1
6.25 TABLET ORAL 2 TIMES DAILY
Status: DISCONTINUED | OUTPATIENT
Start: 2022-05-31 | End: 2022-06-08 | Stop reason: HOSPADM

## 2022-05-31 RX ORDER — LOSARTAN POTASSIUM 50 MG/1
50 TABLET ORAL DAILY
Status: DISCONTINUED | OUTPATIENT
Start: 2022-06-01 | End: 2022-06-08 | Stop reason: HOSPADM

## 2022-05-31 RX ORDER — SODIUM,POTASSIUM PHOSPHATES 280-250MG
2 POWDER IN PACKET (EA) ORAL
Status: DISCONTINUED | OUTPATIENT
Start: 2022-05-31 | End: 2022-06-08 | Stop reason: HOSPADM

## 2022-05-31 RX ORDER — LANOLIN ALCOHOL/MO/W.PET/CERES
800 CREAM (GRAM) TOPICAL
Status: DISCONTINUED | OUTPATIENT
Start: 2022-05-31 | End: 2022-06-08 | Stop reason: HOSPADM

## 2022-05-31 RX ORDER — FLUTICASONE PROPIONATE 50 MCG
1 SPRAY, SUSPENSION (ML) NASAL DAILY
Status: DISCONTINUED | OUTPATIENT
Start: 2022-06-01 | End: 2022-06-08 | Stop reason: HOSPADM

## 2022-05-31 RX ORDER — POLYETHYLENE GLYCOL 3350 17 G/17G
17 POWDER, FOR SOLUTION ORAL DAILY PRN
Status: DISCONTINUED | OUTPATIENT
Start: 2022-05-31 | End: 2022-06-04

## 2022-05-31 RX ORDER — AMLODIPINE BESYLATE 5 MG/1
5 TABLET ORAL DAILY
Status: DISCONTINUED | OUTPATIENT
Start: 2022-06-01 | End: 2022-06-08 | Stop reason: HOSPADM

## 2022-05-31 RX ORDER — ACETAMINOPHEN 500 MG
5000 TABLET ORAL DAILY
Status: DISCONTINUED | OUTPATIENT
Start: 2022-06-01 | End: 2022-06-08 | Stop reason: HOSPADM

## 2022-05-31 RX ORDER — NALOXONE HCL 0.4 MG/ML
0.02 VIAL (ML) INJECTION
Status: DISCONTINUED | OUTPATIENT
Start: 2022-05-31 | End: 2022-06-08 | Stop reason: HOSPADM

## 2022-05-31 RX ORDER — ACETAMINOPHEN 325 MG/1
650 TABLET ORAL ONCE AS NEEDED
Status: COMPLETED | OUTPATIENT
Start: 2022-05-31 | End: 2022-06-05

## 2022-05-31 RX ORDER — PANTOPRAZOLE SODIUM 40 MG/1
40 TABLET, DELAYED RELEASE ORAL DAILY
Status: DISCONTINUED | OUTPATIENT
Start: 2022-06-01 | End: 2022-06-08 | Stop reason: HOSPADM

## 2022-05-31 RX ORDER — METRONIDAZOLE 500 MG/100ML
500 INJECTION, SOLUTION INTRAVENOUS
Status: DISCONTINUED | OUTPATIENT
Start: 2022-05-31 | End: 2022-05-31

## 2022-05-31 RX ORDER — ATORVASTATIN CALCIUM 40 MG/1
40 TABLET, FILM COATED ORAL DAILY
Status: DISCONTINUED | OUTPATIENT
Start: 2022-06-01 | End: 2022-06-08 | Stop reason: HOSPADM

## 2022-05-31 RX ORDER — ACETAMINOPHEN 325 MG/1
650 TABLET ORAL EVERY 8 HOURS PRN
Status: DISCONTINUED | OUTPATIENT
Start: 2022-05-31 | End: 2022-06-08 | Stop reason: HOSPADM

## 2022-05-31 RX ADMIN — PIPERACILLIN AND TAZOBACTAM 3.38 G: 3; .375 INJECTION, POWDER, LYOPHILIZED, FOR SOLUTION INTRAVENOUS; PARENTERAL at 06:05

## 2022-05-31 RX ADMIN — ENOXAPARIN SODIUM 40 MG: 40 INJECTION SUBCUTANEOUS at 11:05

## 2022-05-31 NOTE — H&P
[facility] - Emergency Dept  Hospital Medicine  History & Physical    Patient Name: Tayla Ewing  MRN: [id number]  Patient Class: IP- Inpatient  Admission Date: 5/31/2022  Attending Physician: Clarence Kline MD  Primary Care Provider: Marcel Mueller MD         Patient information was obtained from patient, past medical records and ER records.     Subjective:     Principal Problem:Intra-abdominal abscess    Chief Complaint:   Chief Complaint   Patient presents with    Urinary Frequency     With abdominal pain        HPI: Patient is a 77-year-old  female with history of multiple sclerosis who was recently managed for acute diverticulitis presents to the ED with chief complaint of ongoing abdominal pain.    She was admitted at our facility in the 1st week of April for left-sided abdominal pain and workup revealed diverticulitis of the proximal sigmoid colon with microperforation.  She was evaluated by General surgery and received intravenous antibiotics.  Did not require surgical intervention.  Her clinical condition improved and she was discharged home to finish 10 day course of intravenous ertapenem which she successfully completed.  She also saw General surgery as a follow-up after her discharge at which time her pain was well controlled.  She saw Dr. Gibbons from Gastroenterology who is planning on colonoscopy in the future.    Patient reports worsening of her abdominal pain over the last several days.  Unable to characterize exact timing.  Pain is intermittent.  Worse with certain types of food intake and better with acetaminophen/ibuprofen.  Denies diarrhea or constipation and she is using fiber capsules.  She denies fever or chills.    Rest of the 10 point review of systems is negative except as mentioned above.      Past Medical History:   Diagnosis Date    Hypertension     Multiple sclerosis        Past Surgical History:   Procedure Laterality Date    BRAIN BIOPSY      2009     HYSTERECTOMY         Review of patient's allergies indicates:  No Known Allergies    Current Facility-Administered Medications on File Prior to Encounter   Medication    acetaminophen tablet 650 mg    albuterol inhaler 2 puff    EPINEPHrine 0.1 mg/mL injection 0.3 mg    ondansetron disintegrating tablet 4 mg     Current Outpatient Medications on File Prior to Encounter   Medication Sig    amLODIPine (NORVASC) 5 MG tablet Take 5 mg by mouth once daily.    aspirin (ECOTRIN) 81 MG EC tablet Take 81 mg by mouth once daily.    b complex vitamins tablet Take 1 tablet by mouth once daily.    carvedilol (COREG) 6.25 MG tablet Take 6.25 mg by mouth 2 (two) times daily.    cholecalciferol, vitamin D3, 125 mcg (5,000 unit) Tab Take 5,000 Units by mouth once daily.    ciprofloxacin HCl (CIPRO) 500 MG tablet Take 500 mg by mouth 2 (two) times daily.    CRESTOR 10 mg tablet Take 10 mg by mouth once daily.    ezetimibe (ZETIA) 10 mg tablet Take 10 mg by mouth once daily.    fluticasone propionate (FLONASE) 50 mcg/actuation nasal spray 1-2 sprays by Each Nostril route once daily.    gabapentin (NEURONTIN) 100 MG capsule Take 1 capsule (100 mg total) by mouth 3 (three) times daily.    olmesartan-hydrochlorothiazide (BENICAR HCT) 20-12.5 mg per tablet Take 1 tablet by mouth once daily.    pantoprazole (PROTONIX) 40 MG tablet Take 40 mg by mouth once daily.    ticagrelor (BRILINTA) 60 mg tablet Take 60 mg by mouth 2 (two) times daily.    tiZANidine 2 mg Cap TAKE 1 TO 2 CAPSULES BY MOUTH EVERY NIGHT AT BEDTIME AS NEEDED (Patient taking differently: Take 1-2 capsules by mouth nightly as needed. TAKE 1 TO 2 CAPSULES BY MOUTH EVERY NIGHT AT BEDTIME AS NEEDED)    zinc sulfate (ZINC-15 ORAL) Take 1 tablet by mouth once daily.    pulse oximeter (PULSE OXIMETER) device Use twice daily at 8 AM and 3 PM and [facility] as [facility].    [DISCONTINUED] atorvastatin (LIPITOR) 80 MG tablet Take 80 mg by mouth  once daily.    [DISCONTINUED] azelastine (ASTELIN) 137 mcg (0.1 %) nasal spray SMARTSIG:Both Nares    [DISCONTINUED] BRILINTA 90 mg tablet Take 90 mg by mouth 2 (two) times daily.    [DISCONTINUED] cefdinir (OMNICEF) 300 MG capsule Take 300 mg by mouth 2 (two) times daily.    [DISCONTINUED] CRESTOR 5 mg tablet Take 5 mg by mouth once daily.    [DISCONTINUED] HYDROcodone-acetaminophen (NORCO) 5-325 mg per tablet Take 1 tablet by mouth every 12 (twelve) hours as needed for Pain. (Patient not taking: No sig reported)    [DISCONTINUED] vitamin E 1000 UNIT capsule Take 1,000 Units by mouth once daily.     Family History       Problem Relation (Age of Onset)    Hypertension Mother          Tobacco Use    Smoking status: Never Smoker    Smokeless tobacco: Never Used   Substance and Sexual Activity    Alcohol use: No    Drug use: Not on file    Sexual activity: Not on file       Objective:     Vital Signs (Most Recent):  Temp: 97.8 °F (36.6 °C) (05/31/22 1400)  Pulse: 83 (05/31/22 1400)  Resp: 18 (05/31/22 1400)  BP: (!) 154/82 (05/31/22 1400)  SpO2: 99 % (05/31/22 1400)   Vital Signs (24h Range):  Temp:  [97.8 °F (36.6 °C)] 97.8 °F (36.6 °C)  Pulse:  [83] 83  Resp:  [18] 18  SpO2:  [99 %] 99 %  BP: (154)/(82) 154/82     Weight: 68.9 kg (152 lb)  Body mass index is 25.29 kg/m².    Physical Exam      General: Patient resting comfortably in no acute distress. Appears stated age  Head: Normocephalic and atraumatic   Eyes:  No conjunctival pallor. No scleral icterus.  Mouth: Oral mucosa moist   Lungs: CTA. Good air entry.  Cor: Regular rate and rhythm. No murmurs. No pedal edema.  Abd: Soft.  Left-sided periumbilical tenderness noted.  No guarding or rigidity.  No abdominal distension  Skin: No rashes, swelling, or erythema.  Neuro: Alert. Moving all extremities equally. Follows commands  Ext: No clubbing. No cyanosis. Peripheral pulses +  Psych: Normal mood and affect. Memory intact     Significant Labs: All  pertinent labs within the past 24 hours have been reviewed.  CBC:   Recent Labs   Lab 05/31/22  1523   WBC 12.94*   HGB 11.7*   HCT 33.9*        CMP:   Recent Labs   Lab 05/31/22  1523   *   K 3.5      CO2 22*      BUN 14   CREATININE 0.7   CALCIUM 9.4   PROT 7.9   ALBUMIN 4.0   BILITOT 0.7   ALKPHOS 77   AST 19   ALT 22   ANIONGAP 10   EGFRNONAA >60.0       Significant Imaging: I have reviewed all pertinent imaging results/findings within the past 24 hours.    Imaging Results              CT Abdomen Pelvis  Without Contrast (Final result)  Result time 05/31/22 16:43:49      Final result by Nawaf Lala MD (05/31/22 16:43:49)                   Impression:      1. Development of thick-walled collection of gas in left pelvis, intimately associated with the superior bladder, measuring 3.7 x 2.9 by 3 cm, suspicious for abscess.  2. Findings characteristic of acute diverticulitis of the proximal sigmoid colon, slightly less extensive than that on 04/05/2022 CT.      Electronically signed by: Nawaf Lala MD  Date:    05/31/2022  Time:    16:43               Narrative:      CMS MANDATED QUALITY DATA - CT RADIATION - 436    All CT scans at this facility utilize dose modulation, iterative reconstruction, and/or weight based dosing when appropriate to reduce radiation dose to as low as reasonably achievable.    EXAMINATION:  CT ABDOMEN PELVIS WITHOUT CONTRAST    CLINICAL HISTORY:  Abdominal abscess/infection suspected;LLQ abdominal pain;    TECHNIQUE:  CT abdomen and pelvis without IV or oral contrast. Study is tailored for detection of urinary tract calculi and evaluation of solid organs, hollow viscera, and vascular structures is limited.    COMPARISON:  04/05/2022    FINDINGS:  CT Abdomen:    Coronary artery calcifications are present.  Tiny hiatal hernia also noted.  Calcified granuloma lies in right lower.    Noncontrast liver, gallbladder, pancreas, spleen, and adrenals are normal.    Right  renal cyst incidentally noted.  Superior right renal cortical thinning unchanged suggesting scarring.  Noncontrast kidneys otherwise unremarkable.    Aortoiliac calcifications are mild and diffuse.    Small intestines are unremarkable.  A normal appendix is present.    Degenerative changes affect the spine.    CT Pelvis:    Mild wall thickening and pericolonic inflammatory fat stranding affects the proximal sigmoid colon, slightly less diffuse than on the prior exam.  New since the prior exam is thick walled collection containing internal gas measuring approximately 3.7 x 2.9 x 3 cm, lying just anterior and inferior to the proximal sigmoid colon and intimately associated with left superior bladder wall, suspicious for abscess.    Left ovarian mature cystic teratoma unchanged measuring at least 2.4 cm.  Uterus has been removed.  No free pelvic fluid.  Bladder otherwise unremarkable.    No acute osseous abnormality.                                        Assessment/Plan:     Active Hospital Problems    Diagnosis  POA    *Intra-abdominal abscess [K65.1]  Yes    History of diverticulitis of colon [Z87.19]  Not Applicable    MS (multiple sclerosis) [G35]  Yes      Resolved Hospital Problems   No resolved problems to display.       Plan:  Intra-abdominal abscess in the setting of recent diverticulitis of the proximal colon with microperforation (04/2022) - completed IV antibiotic treatment  Start IV piperacillin-tazobactam   General surgery consultation; patient previously seen by Dr. Briscoe  NPO from midnight in anticipation of intervention  Pain control  Continue home medications for chronic medical conditions including aspirin, ticagrelor, statin and antihypertensives    VTE prophylaxis with enoxaparin     Clarence Kline MD  Department of Hospital Medicine   [facility]

## 2022-05-31 NOTE — FIRST PROVIDER EVALUATION
Emergency Department TeleTriage Encounter Note      CHIEF COMPLAINT    Chief Complaint   Patient presents with    Urinary Frequency     With abdominal pain       VITAL SIGNS   Initial Vitals [05/31/22 1400]   BP Pulse Resp Temp SpO2   (!) 154/82 83 18 97.8 °F (36.6 °C) 99 %      MAP       --            ALLERGIES    Review of patient's allergies indicates:  No Known Allergies    PROVIDER TRIAGE NOTE  This is a teletriage evaluation of a 77 y.o. female presenting to the ED complaining of urinary frequency and abd pain for two weeks. Denies fever. Pmhx of diverticulitis but states this does not feel similar    Initial orders will be placed and care will be transferred to an alternate provider when patient is roomed for a full evaluation. Any additional orders and the final disposition will be determined by that provider.           ORDERS  Labs Reviewed   URINALYSIS, REFLEX TO URINE CULTURE   CBC W/ AUTO DIFFERENTIAL   COMPREHENSIVE METABOLIC PANEL   POCT GLUCOSE MONITORING CONTINUOUS       ED Orders (720h ago, onward)    Start Ordered     Status Ordering Provider    05/31/22 1512 05/31/22 1511  Urinalysis, Reflex to Urine Culture Urine, Clean Catch  STAT         Ordered JOSUÉ SHULTZ N.    05/31/22 1512 05/31/22 1511  POCT glucose  Once         Ordered JOSUÉ SHULTZ N.    05/31/22 1512 05/31/22 1511  CBC auto differential  STAT         Ordered JOSUÉ SHULTZ N.    05/31/22 1512 05/31/22 1511  Comprehensive metabolic panel  STAT         Ordered JOSUÉ SHULTZ N.    05/31/22 1512 05/31/22 1511  Insert Saline lock IV  Once         Ordered JOSUÉ SHULTZ.            Virtual Visit Note: The provider triage portion of this emergency department evaluation and documentation was performed via [employer], a HIPAA-compliant telemedicine application, in concert with a tele-presenter in the room. A face to face patient evaluation with one of my colleagues will occur once the patient  is placed in an emergency department room.      DISCLAIMER: This note was prepared with [employer] voice recognition transcription software. Garbled syntax, mangled pronouns, and other bizarre constructions may be attributed to that software system.

## 2022-05-31 NOTE — Clinical Note
A pre-sedation assessment was completed by the physician immediately prior to sedation start.    Adequate: hears normal conversation without difficulty

## 2022-05-31 NOTE — ED PROVIDER NOTES
Encounter Date: 5/31/2022       History     Chief Complaint   Patient presents with    Urinary Frequency     With abdominal pain     77-year-old female with recent history of diverticulitis with reported complete resolution and followed by GI presents to the emergency room for evaluation of approximately 1 week of urinary frequency and dysuria, suprapubic abdominal pain.  Patient denies fevers, nausea, chills, vomiting.  She has no diarrhea or constipation.  She is concerned for a urinary tract infection.  No blood in her stools or urine.        Review of patient's allergies indicates:  No Known Allergies  Past Medical History:   Diagnosis Date    Hypertension     Multiple sclerosis      Past Surgical History:   Procedure Laterality Date    BRAIN BIOPSY      2009    HYSTERECTOMY       Family History   Problem Relation Age of Onset    Hypertension Mother      Social History     Tobacco Use    Smoking status: Never Smoker    Smokeless tobacco: Never Used   Substance Use Topics    Alcohol use: No     Review of Systems   Constitutional: Negative for chills, fatigue and fever.   HENT: Negative for congestion, hearing loss, sore throat and trouble swallowing.    Eyes: Negative for visual disturbance.   Respiratory: Negative for cough, chest tightness and shortness of breath.    Cardiovascular: Negative for chest pain.   Gastrointestinal: Positive for abdominal pain. Negative for nausea.   Endocrine: Negative for polyuria.   Genitourinary: Positive for dysuria. Negative for difficulty urinating.   Musculoskeletal: Negative for arthralgias and myalgias.   Skin: Negative for rash.   Neurological: Negative for dizziness and headaches.   Psychiatric/Behavioral: The patient is not nervous/anxious.    All other systems reviewed and are negative.      Physical Exam     Initial Vitals [05/31/22 1400]   BP Pulse Resp Temp SpO2   (!) 154/82 83 18 97.8 °F (36.6 °C) 99 %      MAP       --         Physical Exam    Nursing note  and vitals reviewed.  Constitutional: She appears well-developed and well-nourished.   HENT:   Head: Normocephalic and atraumatic.   Eyes: Conjunctivae and EOM are normal.   Neck: Neck supple.   Cardiovascular: Normal rate, regular rhythm, normal heart sounds and intact distal pulses.   Pulmonary/Chest: Breath sounds normal. No respiratory distress.   Abdominal:   Patient placed supine with flexed knees.     Mild abdominal tenderness left lower quadrant and suprapubic.  No peritoneal signs.    Abdomen is flat, soft and nontender in all other quadrants. No CVAT. No guarding, no palpable masses.  No hepatosplenomegaly. No overlying skin changes. Normoactive bowel sounds. No bruits. Distal pulses 2+ b/l. No inguinal lymphadenopathy.  No shifting dullness. Negative Olmedo's sign. Nontender at McBurney's point. No rebound tenderness, negative psoas sign. No pain with heel tap.     Musculoskeletal:         General: Normal range of motion.      Cervical back: Neck supple.     Neurological: She is alert and oriented to person, place, and time. GCS score is 15. GCS eye subscore is 4. GCS verbal subscore is 5. GCS motor subscore is 6.   Skin: Skin is warm and dry. Capillary refill takes less than 2 seconds.   Psychiatric: She has a normal mood and affect. Her behavior is normal. Judgment and thought content normal.         ED Course   Procedures  Labs Reviewed   CBC W/ AUTO DIFFERENTIAL - Abnormal; Notable for the following components:       Result Value    WBC 12.94 (*)     RBC 3.95 (*)     Hemoglobin 11.7 (*)     Hematocrit 33.9 (*)     MPV 9.0 (*)     Gran # (ANC) 9.6 (*)     Immature Grans (Abs) 0.06 (*)     Gran % 74.0 (*)     All other components within normal limits   COMPREHENSIVE METABOLIC PANEL - Abnormal; Notable for the following components:    Sodium 132 (*)     CO2 22 (*)     All other components within normal limits   CULTURE, BLOOD   CULTURE, BLOOD   URINALYSIS, REFLEX TO URINE CULTURE    Narrative:      Specimen Source->Urine   LACTIC ACID, PLASMA   SARS-COV-2 RNA AMPLIFICATION, QUAL   POCT GLUCOSE   POCT GLUCOSE MONITORING CONTINUOUS          Imaging Results          CT Abdomen Pelvis  Without Contrast (Final result)  Result time 05/31/22 16:43:49    Final result by Nawaf Lala MD (05/31/22 16:43:49)                 Impression:      1. Development of thick-walled collection of gas in left pelvis, intimately associated with the superior bladder, measuring 3.7 x 2.9 by 3 cm, suspicious for abscess.  2. Findings characteristic of acute diverticulitis of the proximal sigmoid colon, slightly less extensive than that on 04/05/2022 CT.      Electronically signed by: Naawf Lala MD  Date:    05/31/2022  Time:    16:43             Narrative:      CMS MANDATED QUALITY DATA - CT RADIATION - 436    All CT scans at this facility utilize dose modulation, iterative reconstruction, and/or weight based dosing when appropriate to reduce radiation dose to as low as reasonably achievable.    EXAMINATION:  CT ABDOMEN PELVIS WITHOUT CONTRAST    CLINICAL HISTORY:  Abdominal abscess/infection suspected;LLQ abdominal pain;    TECHNIQUE:  CT abdomen and pelvis without IV or oral contrast. Study is tailored for detection of urinary tract calculi and evaluation of solid organs, hollow viscera, and vascular structures is limited.    COMPARISON:  04/05/2022    FINDINGS:  CT Abdomen:    Coronary artery calcifications are present.  Tiny hiatal hernia also noted.  Calcified granuloma lies in right lower.    Noncontrast liver, gallbladder, pancreas, spleen, and adrenals are normal.    Right renal cyst incidentally noted.  Superior right renal cortical thinning unchanged suggesting scarring.  Noncontrast kidneys otherwise unremarkable.    Aortoiliac calcifications are mild and diffuse.    Small intestines are unremarkable.  A normal appendix is present.    Degenerative changes affect the spine.    CT Pelvis:    Mild wall thickening and  pericolonic inflammatory fat stranding affects the proximal sigmoid colon, slightly less diffuse than on the prior exam.  New since the prior exam is thick walled collection containing internal gas measuring approximately 3.7 x 2.9 x 3 cm, lying just anterior and inferior to the proximal sigmoid colon and intimately associated with left superior bladder wall, suspicious for abscess.    Left ovarian mature cystic teratoma unchanged measuring at least 2.4 cm.  Uterus has been removed.  No free pelvic fluid.  Bladder otherwise unremarkable.    No acute osseous abnormality.                                 Medications   piperacillin-tazobactam 3.375 g in dextrose 5 % 50 mL IVPB (ready to mix system) (has no administration in time range)   piperacillin-tazobactam 3.375 g in dextrose 5 % 50 mL IVPB (ready to mix system) (3.375 g Intravenous New Bag 5/31/22 1807)     Medical Decision Making:   Initial Assessment:   77-year-old female with recent history of diverticulitis with reported complete resolution and followed by GI presents to the emergency room for evaluation of approximately 1 week of urinary frequency and dysuria, suprapubic abdominal pain.  Patient denies fevers, nausea, chills, vomiting.  She has no diarrhea or constipation.  She is concerned for a urinary tract infection.  No blood in her stools or urine.  ED Management:  77-year-old female presenting as above for UTI symptoms in the setting of recent diverticulitis, presumed resolved.  Patient with mild elevation blood cells 12.94 in persistent left lower quadrant and suprapubic pain/tenderness.  CT abdomen/pelvis was performed and demonstrated findings suspicious for abscess in the left pelvis, intimately associated with bladder.  Patient is nontoxic, afebrile however given recent diagnosis of diverticulitis and now UTI like symptoms with new finding of probable abscess, General surgery was consulted.  Dr. Arcos recommends admission to [facility]  with IV antibiotics and NPO.  Discussed patient case with Hospital Medicine, who agrees to admit.    Disposition:  Stable, admit.    I discuss this patient case with the cosigning physician, who agrees with diagnosis and plan of care. This note was written using the assistance of a dictation program and may contain grammatical errors.              ED Course as of 05/31/22 1915   Tue May 31, 2022   1706 Discussed patient case with Dr. Arcos, general surgery.  Plan for admission hospital with IV antibiotics and NPO. [AN]      ED Course User Index  [AN] Mesfin Cooper PA-C             Clinical Impression:   Final diagnoses:  [K65.1] Intra-abdominal abscess          ED Disposition Condition    Admit               Mesfin Cooper PA-C  05/31/22 1915

## 2022-05-31 NOTE — SUBJECTIVE & OBJECTIVE
Past Medical History:   Diagnosis Date    Hypertension     Multiple sclerosis        Past Surgical History:   Procedure Laterality Date    BRAIN BIOPSY      2009    HYSTERECTOMY         Review of patient's allergies indicates:  No Known Allergies    Current Facility-Administered Medications on File Prior to Encounter   Medication    acetaminophen tablet 650 mg    albuterol inhaler 2 puff    EPINEPHrine 0.1 mg/mL injection 0.3 mg    ondansetron disintegrating tablet 4 mg     Current Outpatient Medications on File Prior to Encounter   Medication Sig    amLODIPine (NORVASC) 5 MG tablet Take 5 mg by mouth once daily.    aspirin (ECOTRIN) 81 MG EC tablet Take 81 mg by mouth once daily.    b complex vitamins tablet Take 1 tablet by mouth once daily.    carvedilol (COREG) 6.25 MG tablet Take 6.25 mg by mouth 2 (two) times daily.    cholecalciferol, vitamin D3, 125 mcg (5,000 unit) Tab Take 5,000 Units by mouth once daily.    ciprofloxacin HCl (CIPRO) 500 MG tablet Take 500 mg by mouth 2 (two) times daily.    CRESTOR 10 mg tablet Take 10 mg by mouth once daily.    ezetimibe (ZETIA) 10 mg tablet Take 10 mg by mouth once daily.    fluticasone propionate (FLONASE) 50 mcg/actuation nasal spray 1-2 sprays by Each Nostril route once daily.    gabapentin (NEURONTIN) 100 MG capsule Take 1 capsule (100 mg total) by mouth 3 (three) times daily.    olmesartan-hydrochlorothiazide (BENICAR HCT) 20-12.5 mg per tablet Take 1 tablet by mouth once daily.    pantoprazole (PROTONIX) 40 MG tablet Take 40 mg by mouth once daily.    ticagrelor (BRILINTA) 60 mg tablet Take 60 mg by mouth 2 (two) times daily.    tiZANidine 2 mg Cap TAKE 1 TO 2 CAPSULES BY MOUTH EVERY NIGHT AT BEDTIME AS NEEDED (Patient taking differently: Take 1-2 capsules by mouth nightly as needed. TAKE 1 TO 2 CAPSULES BY MOUTH EVERY NIGHT AT BEDTIME AS NEEDED)    zinc sulfate (ZINC-15 ORAL) Take 1 tablet by mouth once daily.    pulse oximeter (PULSE OXIMETER) device Use twice  daily at 8 AM and 3 PM and record the value in MyChart as directed.    [DISCONTINUED] atorvastatin (LIPITOR) 80 MG tablet Take 80 mg by mouth once daily.    [DISCONTINUED] azelastine (ASTELIN) 137 mcg (0.1 %) nasal spray SMARTSIG:Both Nares    [DISCONTINUED] BRILINTA 90 mg tablet Take 90 mg by mouth 2 (two) times daily.    [DISCONTINUED] cefdinir (OMNICEF) 300 MG capsule Take 300 mg by mouth 2 (two) times daily.    [DISCONTINUED] CRESTOR 5 mg tablet Take 5 mg by mouth once daily.    [DISCONTINUED] HYDROcodone-acetaminophen (NORCO) 5-325 mg per tablet Take 1 tablet by mouth every 12 (twelve) hours as needed for Pain. (Patient not taking: No sig reported)    [DISCONTINUED] vitamin E 1000 UNIT capsule Take 1,000 Units by mouth once daily.     Family History       Problem Relation (Age of Onset)    Hypertension Mother          Tobacco Use    Smoking status: Never Smoker    Smokeless tobacco: Never Used   Substance and Sexual Activity    Alcohol use: No    Drug use: Not on file    Sexual activity: Not on file       Objective:     Vital Signs (Most Recent):  Temp: 97.8 °F (36.6 °C) (05/31/22 1400)  Pulse: 83 (05/31/22 1400)  Resp: 18 (05/31/22 1400)  BP: (!) 154/82 (05/31/22 1400)  SpO2: 99 % (05/31/22 1400)   Vital Signs (24h Range):  Temp:  [97.8 °F (36.6 °C)] 97.8 °F (36.6 °C)  Pulse:  [83] 83  Resp:  [18] 18  SpO2:  [99 %] 99 %  BP: (154)/(82) 154/82     Weight: 68.9 kg (152 lb)  Body mass index is 25.29 kg/m².    Physical Exam      General: Patient resting comfortably in no acute distress. Appears stated age  Head: Normocephalic and atraumatic   Eyes:  No conjunctival pallor. No scleral icterus.  Mouth: Oral mucosa moist   Lungs: CTA. Good air entry.  Cor: Regular rate and rhythm. No murmurs. No pedal edema.  Abd: Soft.  Left-sided periumbilical tenderness noted.  No guarding or rigidity.  No abdominal distension  Skin: No rashes, swelling, or erythema.  Neuro: Alert. Moving all extremities equally. Follows  commands  Ext: No clubbing. No cyanosis. Peripheral pulses +  Psych: Normal mood and affect. Memory intact     Significant Labs: All pertinent labs within the past 24 hours have been reviewed.  CBC:   Recent Labs   Lab 05/31/22  1523   WBC 12.94*   HGB 11.7*   HCT 33.9*        CMP:   Recent Labs   Lab 05/31/22  1523   *   K 3.5      CO2 22*      BUN 14   CREATININE 0.7   CALCIUM 9.4   PROT 7.9   ALBUMIN 4.0   BILITOT 0.7   ALKPHOS 77   AST 19   ALT 22   ANIONGAP 10   EGFRNONAA >60.0       Significant Imaging: I have reviewed all pertinent imaging results/findings within the past 24 hours.    Imaging Results              CT Abdomen Pelvis  Without Contrast (Final result)  Result time 05/31/22 16:43:49      Final result by Nawaf Lala MD (05/31/22 16:43:49)                   Impression:      1. Development of thick-walled collection of gas in left pelvis, intimately associated with the superior bladder, measuring 3.7 x 2.9 by 3 cm, suspicious for abscess.  2. Findings characteristic of acute diverticulitis of the proximal sigmoid colon, slightly less extensive than that on 04/05/2022 CT.      Electronically signed by: Nawaf Lala MD  Date:    05/31/2022  Time:    16:43               Narrative:      CMS MANDATED QUALITY DATA - CT RADIATION - 436    All CT scans at this facility utilize dose modulation, iterative reconstruction, and/or weight based dosing when appropriate to reduce radiation dose to as low as reasonably achievable.    EXAMINATION:  CT ABDOMEN PELVIS WITHOUT CONTRAST    CLINICAL HISTORY:  Abdominal abscess/infection suspected;LLQ abdominal pain;    TECHNIQUE:  CT abdomen and pelvis without IV or oral contrast. Study is tailored for detection of urinary tract calculi and evaluation of solid organs, hollow viscera, and vascular structures is limited.    COMPARISON:  04/05/2022    FINDINGS:  CT Abdomen:    Coronary artery calcifications are present.  Tiny hiatal hernia also  noted.  Calcified granuloma lies in right lower.    Noncontrast liver, gallbladder, pancreas, spleen, and adrenals are normal.    Right renal cyst incidentally noted.  Superior right renal cortical thinning unchanged suggesting scarring.  Noncontrast kidneys otherwise unremarkable.    Aortoiliac calcifications are mild and diffuse.    Small intestines are unremarkable.  A normal appendix is present.    Degenerative changes affect the spine.    CT Pelvis:    Mild wall thickening and pericolonic inflammatory fat stranding affects the proximal sigmoid colon, slightly less diffuse than on the prior exam.  New since the prior exam is thick walled collection containing internal gas measuring approximately 3.7 x 2.9 x 3 cm, lying just anterior and inferior to the proximal sigmoid colon and intimately associated with left superior bladder wall, suspicious for abscess.    Left ovarian mature cystic teratoma unchanged measuring at least 2.4 cm.  Uterus has been removed.  No free pelvic fluid.  Bladder otherwise unremarkable.    No acute osseous abnormality.

## 2022-05-31 NOTE — TELEPHONE ENCOUNTER
Patient sent an appointment request for a urinary tract infection. Patient has never been seen in this clinic and would have to schedule an in-person NEW PATIENT appointment

## 2022-05-31 NOTE — HPI
Patient is a 77-year-old  female with history of multiple sclerosis who was recently managed for acute diverticulitis presents to the ED with chief complaint of ongoing abdominal pain.    She was admitted at our facility in the 1st week of April for left-sided abdominal pain and workup revealed diverticulitis of the proximal sigmoid colon with microperforation.  She was evaluated by General surgery and received intravenous antibiotics.  Did not require surgical intervention.  Her clinical condition improved and she was discharged home to finish 10 day course of intravenous ertapenem which she successfully completed.  She also saw General surgery as a follow-up after her discharge at which time her pain was well controlled.  She saw Dr. Gibbons from Gastroenterology who is planning on colonoscopy in the future.    Patient reports worsening of her abdominal pain over the last several days.  Unable to characterize exact timing.  Pain is intermittent.  Worse with certain types of food intake and better with acetaminophen/ibuprofen.  Denies diarrhea or constipation and she is using fiber capsules.  She denies fever or chills.    Rest of the 10 point review of systems is negative except as mentioned above.

## 2022-06-01 LAB
ANION GAP SERPL CALC-SCNC: 11 MMOL/L (ref 8–16)
BUN SERPL-MCNC: 13 MG/DL (ref 8–23)
CALCIUM SERPL-MCNC: 9.4 MG/DL (ref 8.7–10.5)
CHLORIDE SERPL-SCNC: 104 MMOL/L (ref 95–110)
CO2 SERPL-SCNC: 22 MMOL/L (ref 23–29)
CREAT SERPL-MCNC: 0.6 MG/DL (ref 0.5–1.4)
ERYTHROCYTE [DISTWIDTH] IN BLOOD BY AUTOMATED COUNT: 12.7 % (ref 11.5–14.5)
EST. GFR  (AFRICAN AMERICAN): >60 ML/MIN/1.73 M^2
EST. GFR  (NON AFRICAN AMERICAN): >60 ML/MIN/1.73 M^2
GLUCOSE SERPL-MCNC: 103 MG/DL (ref 70–110)
HCT VFR BLD AUTO: 32.7 % (ref 37–48.5)
HGB BLD-MCNC: 11 G/DL (ref 12–16)
MAGNESIUM SERPL-MCNC: 1.8 MG/DL (ref 1.6–2.6)
MCH RBC QN AUTO: 29.5 PG (ref 27–31)
MCHC RBC AUTO-ENTMCNC: 33.6 G/DL (ref 32–36)
MCV RBC AUTO: 88 FL (ref 82–98)
PLATELET # BLD AUTO: 298 K/UL (ref 150–450)
PMV BLD AUTO: 9.1 FL (ref 9.2–12.9)
POTASSIUM SERPL-SCNC: 3.7 MMOL/L (ref 3.5–5.1)
RBC # BLD AUTO: 3.73 M/UL (ref 4–5.4)
SODIUM SERPL-SCNC: 137 MMOL/L (ref 136–145)
WBC # BLD AUTO: 8.01 K/UL (ref 3.9–12.7)

## 2022-06-01 PROCEDURE — 80048 BASIC METABOLIC PNL TOTAL CA: CPT | Performed by: INTERNAL MEDICINE

## 2022-06-01 PROCEDURE — 12000002 HC ACUTE/MED SURGE SEMI-PRIVATE ROOM

## 2022-06-01 PROCEDURE — 85027 COMPLETE CBC AUTOMATED: CPT | Performed by: INTERNAL MEDICINE

## 2022-06-01 PROCEDURE — 36415 COLL VENOUS BLD VENIPUNCTURE: CPT | Performed by: INTERNAL MEDICINE

## 2022-06-01 PROCEDURE — 63600175 PHARM REV CODE 636 W HCPCS: Performed by: INTERNAL MEDICINE

## 2022-06-01 PROCEDURE — 99223 PR INITIAL HOSPITAL CARE,LEVL III: ICD-10-PCS | Mod: ,,, | Performed by: SURGERY

## 2022-06-01 PROCEDURE — 25000003 PHARM REV CODE 250: Performed by: INTERNAL MEDICINE

## 2022-06-01 PROCEDURE — 99223 1ST HOSP IP/OBS HIGH 75: CPT | Mod: ,,, | Performed by: SURGERY

## 2022-06-01 PROCEDURE — 83735 ASSAY OF MAGNESIUM: CPT | Performed by: INTERNAL MEDICINE

## 2022-06-01 RX ADMIN — ATORVASTATIN CALCIUM 40 MG: 40 TABLET, FILM COATED ORAL at 03:06

## 2022-06-01 RX ADMIN — Medication 5000 UNITS: at 03:06

## 2022-06-01 RX ADMIN — PIPERACILLIN SODIUM,TAZOBACTAM SODIUM 3.38 G: 3; .375 INJECTION, POWDER, FOR SOLUTION INTRAVENOUS at 02:06

## 2022-06-01 RX ADMIN — EZETIMIBE 10 MG: 10 TABLET ORAL at 03:06

## 2022-06-01 RX ADMIN — CARVEDILOL 6.25 MG: 6.25 TABLET, FILM COATED ORAL at 08:06

## 2022-06-01 RX ADMIN — HYDROCHLOROTHIAZIDE 12.5 MG: 12.5 TABLET ORAL at 03:06

## 2022-06-01 RX ADMIN — AMLODIPINE BESYLATE 5 MG: 5 TABLET ORAL at 03:06

## 2022-06-01 RX ADMIN — LOSARTAN POTASSIUM 50 MG: 50 TABLET, FILM COATED ORAL at 03:06

## 2022-06-01 RX ADMIN — CARVEDILOL 6.25 MG: 6.25 TABLET, FILM COATED ORAL at 03:06

## 2022-06-01 RX ADMIN — ACETAMINOPHEN 650 MG: 325 TABLET ORAL at 08:06

## 2022-06-01 RX ADMIN — PIPERACILLIN SODIUM,TAZOBACTAM SODIUM 3.38 G: 3; .375 INJECTION, POWDER, FOR SOLUTION INTRAVENOUS at 05:06

## 2022-06-01 RX ADMIN — PIPERACILLIN SODIUM,TAZOBACTAM SODIUM 3.38 G: 3; .375 INJECTION, POWDER, FOR SOLUTION INTRAVENOUS at 10:06

## 2022-06-01 NOTE — PLAN OF CARE
[facility]  Initial Discharge Assessment       Primary Care Provider: Marcel Mueller MD    Admission Diagnosis: Intra-abdominal abscess [K65.1]    Admission Date: 5/31/2022  Expected Discharge Date:     Discharge Barriers Identified: (P) None    Assessment completed at bedside with pt and her spouse, Osei Ewing, [phone]. Pt lives with her  and plans to return home at discharge. Pt does not use any DME at home and is not requesting any for discharge. Pt states her  is her POA    Payor: MEDICARE / Plan: MEDICARE PART A & B / Product Type: Government /     Extended Emergency Contact Information  Primary Emergency Contact: Osei Ewing  Address: [address] [place] [address]  Home Phone: [phone]  Mobile Phone: [phone]  Relation: Spouse    Discharge Plan A: (P) Home with family  Discharge Plan B: (P) Home with family      [id number] [place], [place] - [address] [facility] [address]  [address] [place] [address]  Phone: [phone] Fax: [phone]    [address] [address]  Phone: [phone] Fax: [phone]    [facility] - [place], [place] - [address] [place] [address]  Phone: [phone] Fax: [phone]    [facility] - [place], [place] - [address] [place] [address]  Phone: [phone] Fax: [phone]      Initial Assessment (most recent)       Adult Discharge Assessment - 06/01/22 1151          Discharge Assessment    Assessment Type Discharge Planning Assessment (P)      Confirmed/corrected address, phone number and insurance Yes (P)      Source of Information patient (P)      When was your last doctors appointment? -- (P)    April, Dr. Gibbons    Communicated JAYSHREE with  patient/caregiver Date not available/Unable to determine (P)      Reason For Admission Stomach Pains (P)      Lives With spouse (P)      Facility Arrived From: home (P)      Do you expect to return to your current living situation? Yes (P)      Do you have help at home or someone to help you manage your care at home? Yes (P)      Who are your caregiver(s) and their phone number(s)? Osei Ewing, [phone], spouse (P)      Prior to hospitilization cognitive status: Alert/Oriented (P)      Current cognitive status: Alert/Oriented (P)      Walking or Climbing Stairs Difficulty none (P)      Dressing/Bathing Difficulty none (P)      Equipment Currently Used at Home none (P)      Readmission within 30 days? No (P)      Patient currently being followed by outpatient case management? No (P)      Do you currently have service(s) that help you manage your care at home? No (P)      Do you take prescription medications? Yes (P)      Do you have prescription coverage? Yes (P)      Coverage Medicare (P)      Do you have any problems affording any of your prescribed medications? No (P)      Is the patient taking medications as prescribed? yes (P)      Who is going to help you get home at discharge? Osei Ewing, [phone], spouse (P)      How do you get to doctors appointments? car, drives self (P)      Are you on dialysis? No (P)      Do you take coumadin? No (P)      Discharge Plan A Home with family (P)      Discharge Plan B Home with family (P)      DME Needed Upon Discharge  none (P)      Discharge Plan discussed with: Spouse/sig other;Patient (P)      Name(s) and Number(s) Osei Ewing [phone], spouse (P)      Discharge Barriers Identified None (P)

## 2022-06-01 NOTE — PROGRESS NOTES
IR consult for percutaneous drain.  Per their guidelines, they would want her to be off of aspirin for 3-5 days and off of Brilinta for 5 days.    I talked to the patient and the last time she had any stents placed was several years ago.    The last time she took her medications was the evening of Monday 5/30.      I will let her eat for now.  Continue ABX.

## 2022-06-01 NOTE — PLAN OF CARE
06/01/22 1159   Medicare Message   Important Message from Medicare regarding Discharge Appeal Rights Given to patient/caregiver;Explained to patient/caregiver   Date IMM was signed 05/31/22   Time IMM was signed 1417   Pt signed IMM during registration on 5/31

## 2022-06-01 NOTE — HPI
77-year-old female admitted with diverticulitis.  She was in the hospital last month with acute diverticulitis which resolved and was sent home with oral antibiotics.  The last couple weeks she has complained of a sense of dysuria.  Urinalysis was unremarkable.  CT scan did reveal a 3 cm abscess anterior to the bladder.  This appears to be diverticular.  There is no free perforation.  She feels much better today.  White blood cell count is now normal.  She is afebrile.  She has minimal if any abdominal pain.

## 2022-06-01 NOTE — SUBJECTIVE & OBJECTIVE
No current facility-administered medications on file prior to encounter.     Current Outpatient Medications on File Prior to Encounter   Medication Sig    amLODIPine (NORVASC) 5 MG tablet Take 5 mg by mouth once daily.    aspirin (ECOTRIN) 81 MG EC tablet Take 81 mg by mouth once daily.    b complex vitamins tablet Take 1 tablet by mouth once daily.    carvedilol (COREG) 6.25 MG tablet Take 6.25 mg by mouth 2 (two) times daily.    cholecalciferol, vitamin D3, 125 mcg (5,000 unit) Tab Take 5,000 Units by mouth once daily.    ciprofloxacin HCl (CIPRO) 500 MG tablet Take 500 mg by mouth 2 (two) times daily.    CRESTOR 10 mg tablet Take 10 mg by mouth once daily.    ezetimibe (ZETIA) 10 mg tablet Take 10 mg by mouth once daily.    fluticasone propionate (FLONASE) 50 mcg/actuation nasal spray 1-2 sprays by Each Nostril route once daily.    gabapentin (NEURONTIN) 100 MG capsule Take 1 capsule (100 mg total) by mouth 3 (three) times daily.    olmesartan-hydrochlorothiazide (BENICAR HCT) 20-12.5 mg per tablet Take 1 tablet by mouth once daily.    pantoprazole (PROTONIX) 40 MG tablet Take 40 mg by mouth once daily.    ticagrelor (BRILINTA) 60 mg tablet Take 60 mg by mouth 2 (two) times daily.    tiZANidine 2 mg Cap TAKE 1 TO 2 CAPSULES BY MOUTH EVERY NIGHT AT BEDTIME AS NEEDED (Patient taking differently: Take 1-2 capsules by mouth nightly as needed. TAKE 1 TO 2 CAPSULES BY MOUTH EVERY NIGHT AT BEDTIME AS NEEDED)    zinc sulfate (ZINC-15 ORAL) Take 1 tablet by mouth once daily.    pulse oximeter (PULSE OXIMETER) device Use twice daily at 8 AM and 3 PM and record the value in [employer] as directed.    [DISCONTINUED] atorvastatin (LIPITOR) 80 MG tablet Take 80 mg by mouth once daily.    [DISCONTINUED] azelastine (ASTELIN) 137 mcg (0.1 %) nasal spray SMARTSIG:Both Nares       Review of patient's allergies indicates:  No Known Allergies    Past Medical History:   Diagnosis Date    Hypertension     Multiple sclerosis      Past  Surgical History:   Procedure Laterality Date    BRAIN BIOPSY      2009    HYSTERECTOMY       Family History       Problem Relation (Age of Onset)    Hypertension Mother          Tobacco Use    Smoking status: Never Smoker    Smokeless tobacco: Never Used   Substance and Sexual Activity    Alcohol use: No    Drug use: Not on file    Sexual activity: Not on file     Review of Systems   Constitutional:  Negative for activity change, chills, fever and unexpected weight change.   HENT:  Negative for congestion, sore throat, trouble swallowing and voice change.    Eyes:  Negative for redness and visual disturbance.   Respiratory:  Negative for cough, shortness of breath and wheezing.    Cardiovascular:  Negative for chest pain and palpitations.   Gastrointestinal:  Positive for abdominal pain. Negative for blood in stool, nausea and vomiting.   Endocrine: Negative.    Genitourinary:  Positive for dysuria. Negative for frequency and hematuria.   Musculoskeletal:  Negative for arthralgias, back pain and neck pain.   Skin:  Negative for rash and wound.   Allergic/Immunologic: Negative.    Neurological:  Negative for dizziness, weakness and headaches.   Hematological:  Negative for adenopathy.   Psychiatric/Behavioral:  Negative for agitation and dysphoric mood. The patient is not nervous/anxious.    Objective:     Vital Signs (Most Recent):  Temp: 98.1 °F (36.7 °C) (06/01/22 0701)  Pulse: 61 (06/01/22 0701)  Resp: 18 (06/01/22 0701)  BP: 134/70 (06/01/22 0701)  SpO2: 98 % (06/01/22 0701) Vital Signs (24h Range):  Temp:  [97.5 °F (36.4 °C)-98.1 °F (36.7 °C)] 98.1 °F (36.7 °C)  Pulse:  [61-83] 61  Resp:  [17-18] 18  SpO2:  [98 %-100 %] 98 %  BP: (123-154)/(65-82) 134/70     Weight: 67.5 kg (148 lb 13 oz)  Body mass index is 24.76 kg/m².    Physical Exam  Constitutional:       General: She is not in acute distress.     Appearance: She is well-developed.   HENT:      Head: Normocephalic and atraumatic.   Eyes:      General: No  scleral icterus.     Conjunctiva/sclera: Conjunctivae normal.      Pupils: Pupils are equal, round, and reactive to light.   Neck:      Thyroid: No thyromegaly.   Cardiovascular:      Rate and Rhythm: Normal rate and regular rhythm.      Heart sounds: No murmur heard.  Pulmonary:      Effort: Pulmonary effort is normal.      Breath sounds: Normal breath sounds. No wheezing or rales.   Abdominal:      General: Bowel sounds are normal. There is no distension.      Palpations: Abdomen is soft. There is no mass.      Tenderness: There is no abdominal tenderness.      Hernia: No hernia is present.   Musculoskeletal:         General: Normal range of motion.      Cervical back: Normal range of motion.   Lymphadenopathy:      Cervical: No cervical adenopathy.   Skin:     General: Skin is warm and dry.      Findings: No erythema or rash.   Neurological:      Mental Status: She is alert and oriented to person, place, and time.      Cranial Nerves: No cranial nerve deficit.   Psychiatric:         Behavior: Behavior normal.       Significant Labs:  I have reviewed all pertinent lab results within the past 24 hours.  CBC:   Recent Labs   Lab 06/01/22  0509   WBC 8.01   RBC 3.73*   HGB 11.0*   HCT 32.7*      MCV 88   MCH 29.5   MCHC 33.6     CMP:   Recent Labs   Lab 05/31/22  1523 06/01/22  0508    103   CALCIUM 9.4 9.4   ALBUMIN 4.0  --    PROT 7.9  --    * 137   K 3.5 3.7   CO2 22* 22*    104   BUN 14 13   CREATININE 0.7 0.6   ALKPHOS 77  --    ALT 22  --    AST 19  --    BILITOT 0.7  --        Significant Diagnostics:  I have reviewed all pertinent imaging results/findings within the past 24 hours.  CT: I have reviewed all pertinent results/findings within the past 24 hours and my personal findings are:  CT scan report and images reviewed.

## 2022-06-01 NOTE — CONSULTS
[facility]  General Surgery  Consult Note    Patient Name: Tayla Ewing  MRN: [id number]  Code Status: Full Code  Admission Date: 5/31/2022  Hospital Length of Stay: 1 days  Attending Physician: Bruce Ham MD  Primary Care Provider: Marcel Mueller MD    Patient information was obtained from patient and ER records.     Inpatient consult to General surgery  Consult performed by: Fortunato Arcos MD  Consult ordered by: Clarence Kline MD        Subjective:     Principal Problem: Intra-abdominal abscess    History of Present Illness: 77-year-old female admitted with diverticulitis.  She was in the hospital last month with acute diverticulitis which resolved and was sent home with oral antibiotics.  The last couple weeks she has complained of a sense of dysuria.  Urinalysis was unremarkable.  CT scan did reveal a 3 cm abscess anterior to the bladder.  This appears to be diverticular.  There is no free perforation.  She feels much better today.  White blood cell count is now normal.  She is afebrile.  She has minimal if any abdominal pain.      No current facility-administered medications on file prior to encounter.     Current Outpatient Medications on File Prior to Encounter   Medication Sig    amLODIPine (NORVASC) 5 MG tablet Take 5 mg by mouth once daily.    aspirin (ECOTRIN) 81 MG EC tablet Take 81 mg by mouth once daily.    b complex vitamins tablet Take 1 tablet by mouth once daily.    carvedilol (COREG) 6.25 MG tablet Take 6.25 mg by mouth 2 (two) times daily.    cholecalciferol, vitamin D3, 125 mcg (5,000 unit) Tab Take 5,000 Units by mouth once daily.    ciprofloxacin HCl (CIPRO) 500 MG tablet Take 500 mg by mouth 2 (two) times daily.    CRESTOR 10 mg tablet Take 10 mg by mouth once daily.    ezetimibe (ZETIA) 10 mg tablet Take 10 mg by mouth once daily.    fluticasone propionate (FLONASE) 50 mcg/actuation nasal spray 1-2 sprays by Each Nostril route once daily.    gabapentin  (NEURONTIN) 100 MG capsule Take 1 capsule (100 mg total) by mouth 3 (three) times daily.    olmesartan-hydrochlorothiazide (BENICAR HCT) 20-12.5 mg per tablet Take 1 tablet by mouth once daily.    pantoprazole (PROTONIX) 40 MG tablet Take 40 mg by mouth once daily.    ticagrelor (BRILINTA) 60 mg tablet Take 60 mg by mouth 2 (two) times daily.    tiZANidine 2 mg Cap TAKE 1 TO 2 CAPSULES BY MOUTH EVERY NIGHT AT BEDTIME AS NEEDED (Patient taking differently: Take 1-2 capsules by mouth nightly as needed. TAKE 1 TO 2 CAPSULES BY MOUTH EVERY NIGHT AT BEDTIME AS NEEDED)    zinc sulfate (ZINC-15 ORAL) Take 1 tablet by mouth once daily.    pulse oximeter (PULSE OXIMETER) device Use twice daily at 8 AM and 3 PM and record the value in MyChart as directed.    [DISCONTINUED] atorvastatin (LIPITOR) 80 MG tablet Take 80 mg by mouth once daily.    [DISCONTINUED] azelastine (ASTELIN) 137 mcg (0.1 %) nasal spray SMARTSIG:Both Nares       Review of patient's allergies indicates:  No Known Allergies    Past Medical History:   Diagnosis Date    Hypertension     Multiple sclerosis      Past Surgical History:   Procedure Laterality Date    BRAIN BIOPSY      2009    HYSTERECTOMY       Family History       Problem Relation (Age of Onset)    Hypertension Mother          Tobacco Use    Smoking status: Never Smoker    Smokeless tobacco: Never Used   Substance and Sexual Activity    Alcohol use: No    Drug use: Not on file    Sexual activity: Not on file     Review of Systems   Constitutional:  Negative for activity change, chills, fever and unexpected weight change.   HENT:  Negative for congestion, sore throat, trouble swallowing and voice change.    Eyes:  Negative for redness and visual disturbance.   Respiratory:  Negative for cough, shortness of breath and wheezing.    Cardiovascular:  Negative for chest pain and palpitations.   Gastrointestinal:  Positive for abdominal pain. Negative for blood in stool, nausea and  vomiting.   Endocrine: Negative.    Genitourinary:  Positive for dysuria. Negative for frequency and hematuria.   Musculoskeletal:  Negative for arthralgias, back pain and neck pain.   Skin:  Negative for rash and wound.   Allergic/Immunologic: Negative.    Neurological:  Negative for dizziness, weakness and headaches.   Hematological:  Negative for adenopathy.   Psychiatric/Behavioral:  Negative for agitation and dysphoric mood. The patient is not nervous/anxious.    Objective:     Vital Signs (Most Recent):  Temp: 98.1 °F (36.7 °C) (06/01/22 0701)  Pulse: 61 (06/01/22 0701)  Resp: 18 (06/01/22 0701)  BP: 134/70 (06/01/22 0701)  SpO2: 98 % (06/01/22 0701) Vital Signs (24h Range):  Temp:  [97.5 °F (36.4 °C)-98.1 °F (36.7 °C)] 98.1 °F (36.7 °C)  Pulse:  [61-83] 61  Resp:  [17-18] 18  SpO2:  [98 %-100 %] 98 %  BP: (123-154)/(65-82) 134/70     Weight: 67.5 kg (148 lb 13 oz)  Body mass index is 24.76 kg/m².    Physical Exam  Constitutional:       General: She is not in acute distress.     Appearance: She is well-developed.   HENT:      Head: Normocephalic and atraumatic.   Eyes:      General: No scleral icterus.     Conjunctiva/sclera: Conjunctivae normal.      Pupils: Pupils are equal, round, and reactive to light.   Neck:      Thyroid: No thyromegaly.   Cardiovascular:      Rate and Rhythm: Normal rate and regular rhythm.      Heart sounds: No murmur heard.  Pulmonary:      Effort: Pulmonary effort is normal.      Breath sounds: Normal breath sounds. No wheezing or rales.   Abdominal:      General: Bowel sounds are normal. There is no distension.      Palpations: Abdomen is soft. There is no mass.      Tenderness: There is no abdominal tenderness.      Hernia: No hernia is present.   Musculoskeletal:         General: Normal range of motion.      Cervical back: Normal range of motion.   Lymphadenopathy:      Cervical: No cervical adenopathy.   Skin:     General: Skin is warm and dry.      Findings: No erythema or rash.    Neurological:      Mental Status: She is alert and oriented to person, place, and time.      Cranial Nerves: No cranial nerve deficit.   Psychiatric:         Behavior: Behavior normal.       Significant Labs:  I have reviewed all pertinent lab results within the past 24 hours.  CBC:   Recent Labs   Lab 06/01/22  0509   WBC 8.01   RBC 3.73*   HGB 11.0*   HCT 32.7*      MCV 88   MCH 29.5   MCHC 33.6     CMP:   Recent Labs   Lab 05/31/22  1523 06/01/22  0508    103   CALCIUM 9.4 9.4   ALBUMIN 4.0  --    PROT 7.9  --    * 137   K 3.5 3.7   CO2 22* 22*    104   BUN 14 13   CREATININE 0.7 0.6   ALKPHOS 77  --    ALT 22  --    AST 19  --    BILITOT 0.7  --        Significant Diagnostics:  I have reviewed all pertinent imaging results/findings within the past 24 hours.  CT: I have reviewed all pertinent results/findings within the past 24 hours and my personal findings are:  CT scan report and images reviewed.      Assessment/Plan:     * Intra-abdominal abscess  1. Diverticular abscess.  2. Patient is relatively asymptomatic and stable at this time.  3. Will ask Radiology to place percutaneous drain.  4. Following.      VTE Risk Mitigation (From admission, onward)         Ordered     enoxaparin injection 40 mg  Daily         05/31/22 2058     IP VTE HIGH RISK PATIENT  Once         05/31/22 2058     Place sequential compression device  Until discontinued         05/31/22 2058                Thank you for your consult. I will follow-up with patient. Please contact us if you have any additional questions.    Fortunato Arcos MD  General Surgery  [facility]

## 2022-06-01 NOTE — PROGRESS NOTES
"[facility] Medicine Progress Note  Patient Name: Tayla Ewing MRN: [id number]   Patient Class: IP- Inpatient  Length of Stay: 1   Admission Date: 5/31/2022  1:58 PM Attending Physician: Bruce Ham MD   Primary Care Provider: Marcel Mueller MD Face-to-Face encounter date: 06/01/2022   Chief Complaint: Urinary Frequency (With abdominal pain)    Assessment & Plan:   Tayla Ewing is a 77 y.o. female admitted for left lower quadrant pain/intra-abdominal abscess    Active Problems/Assessment & Plan  1. Intra-abdominal abscess  Recent diverticulitis of proximal colon with microperforation-recent IV antibiotics  Zosyn  Surgery consult pending  Currently NPO  Pain much improved today  Positive bowel sounds, but not passing gas per patient report  Initial mild leukocytosis resolved    Core measures:  - Code status: full code   - Consultants:  General surgery  - Diet:  NPO  - DVT PPx:  Lovenox  - lines: PIV       Hospital Course     06/01/2022          Discharge Planning:   No mobility needs. Patient will be discharged in     PT C/S for debility/fraility/deconditioning and assistance in discharge needs.     Subjective:    Interval History   Patient is doing better today with improvement in pain.  Reports not passing gas.  No bowel movement.    Denies chest pain, shortness of breath, palpitations, abdominal pain, nausea/vomiting.   No concerns/issues overnight reported by the patient or the nursing staff.  Reviewed the labs and discussed the plan of care.   No family present at bedside.     Review of Systems   All other Review of Systems were found to be negative expect for that mentioned already in HPI.   Objective:   Physical Exam  /70   Pulse 61   Temp 98.1 °F (36.7 °C) (Oral)   Resp 18   Ht 5' 5" (1.651 m)   Wt 67.5 kg (148 lb 13 oz)   SpO2 98%   Breastfeeding No   BMI 24.76 kg/m²   Vitals reviewed.    General:  Alert and oriented x4.  No acute distress  HEENT:  " Normocephalic  Cardiovascular:  Regular rate and rhythm, no murmurs rubs or gallops.  No lower extremity edema.  Pulmonary:  Clear to auscultation bilaterally  Abdomen:  Soft, nontender, nondistended.  No guarding.  No rebound.  Negative Olmedo's.  Positive bowel sounds.  Extremity:  Moves all extremities equally.  Dermatology:  No rashes appreciated on exposed skin  Psychiatric:  Normal affect    Following labs were Reviewed   Recent Labs   Lab 05/31/22  1523 06/01/22  0508 06/01/22  0509   WBC 12.94*  --  8.01   HGB 11.7*  --  11.0*   HCT 33.9*  --  32.7*     --  298   CALCIUM 9.4 9.4  --    ALBUMIN 4.0  --   --    PROT 7.9  --   --    * 137  --    K 3.5 3.7  --    CO2 22* 22*  --     104  --    BUN 14 13  --    CREATININE 0.7 0.6  --    ALKPHOS 77  --   --    ALT 22  --   --    AST 19  --   --    BILITOT 0.7  --   --      No results found for: POCTGLUCOSE     BMP:   Recent Labs   Lab 05/31/22  1523 06/01/22  0508    103   * 137   K 3.5 3.7    104   CO2 22* 22*   BUN 14 13   CREATININE 0.7 0.6   CALCIUM 9.4 9.4   MG  --  1.8   , CBC   Recent Labs   Lab 05/31/22  1523 06/01/22  0509   WBC 12.94* 8.01   HGB 11.7* 11.0*   HCT 33.9* 32.7*    298    and All labs within the past 24 hours have been reviewed  Microbiology Results (last 7 days)     Procedure Component Value Units Date/Time    Blood culture #2 **CANNOT BE ORDERED STAT** [[id number]] Collected: 05/31/22 1753    Order Status: Completed Specimen: Blood from Peripheral, Antecubital, Right Updated: 06/01/22 0117     Blood Culture, Routine No Growth to date    Blood Culture #1 **CANNOT BE ORDERED STAT** [[id number]] Collected: 05/31/22 1725    Order Status: Completed Specimen: Blood from Peripheral, Antecubital, Left Updated: 05/31/22 2358     Blood Culture, Routine No Growth to date        CT Abdomen Pelvis  Without Contrast   Final Result      1. Development of thick-walled collection of gas in left pelvis, intimately  associated with the superior bladder, measuring 3.7 x 2.9 by 3 cm, suspicious for abscess.   2. Findings characteristic of acute diverticulitis of the proximal sigmoid colon, slightly less extensive than that on 04/05/2022 CT.         Electronically signed by: Nawaf Lala MD   Date:    05/31/2022   Time:    16:43          Inpatient medications  Scheduled Meds:   amLODIPine  5 mg Oral Daily    aspirin  81 mg Oral Daily    atorvastatin  40 mg Oral Daily    carvediloL  6.25 mg Oral BID    cholecalciferol (vitamin D3)  5,000 Units Oral Daily    enoxaparin  40 mg Subcutaneous Daily    ezetimibe  10 mg Oral Daily    fluticasone propionate  1 spray Each Nostril Daily    hydroCHLOROthiazide  12.5 mg Oral Daily    losartan  50 mg Oral Daily    pantoprazole  40 mg Oral Daily    piperacillin-tazobactam (ZOSYN) IVPB  3.375 g Intravenous Q8H    ticagrelor  60 mg Oral BID     Continuous Infusions:  PRN Meds:.acetaminophen, acetaminophen, gabapentin, magnesium oxide, magnesium oxide, melatonin, naloxone, ondansetron, polyethylene glycol, potassium bicarbonate, potassium bicarbonate, potassium bicarbonate, potassium, sodium phosphates, potassium, sodium phosphates, potassium, sodium phosphates, tiZANidine    Above encounter included review of the medical records, interviewing and examining the patient face-to-face, discussion with family and other health care providers, ordering and interpreting lab/test results and formulating a plan of care.     Medical Decision Making:    [] Low Complexity  [x] Moderate Complexity  [] High Complexity    Bruce Ham  [facility] Hospitalist  06/01/2022

## 2022-06-01 NOTE — ASSESSMENT & PLAN NOTE
1. Diverticular abscess.  2. Patient is relatively asymptomatic and stable at this time.  3. Will ask Radiology to place percutaneous drain.  4. Following.

## 2022-06-01 NOTE — PLAN OF CARE
Pt compliant with POC. RR even and unlabored.  . NO signs of distress noted. Will continue monitor. Hold Blood Thinners .       Problem: Adult Inpatient Plan of Care  Goal: Plan of Care Review  Outcome: Ongoing, Progressing  Goal: Patient-Specific Goal (Individualized)  Outcome: Ongoing, Progressing  Goal: Absence of Hospital-Acquired Illness or Injury  Outcome: Ongoing, Progressing  Goal: Optimal Comfort and Wellbeing  Outcome: Ongoing, Progressing  Goal: Readiness for Transition of Care  Outcome: Ongoing, Progressing     Problem: Fall Injury Risk  Goal: Absence of Fall and Fall-Related Injury  Outcome: Ongoing, Progressing

## 2022-06-02 LAB
ANION GAP SERPL CALC-SCNC: 9 MMOL/L (ref 8–16)
BUN SERPL-MCNC: 18 MG/DL (ref 8–23)
CALCIUM SERPL-MCNC: 8.9 MG/DL (ref 8.7–10.5)
CHLORIDE SERPL-SCNC: 102 MMOL/L (ref 95–110)
CO2 SERPL-SCNC: 21 MMOL/L (ref 23–29)
CREAT SERPL-MCNC: 1 MG/DL (ref 0.5–1.4)
ERYTHROCYTE [DISTWIDTH] IN BLOOD BY AUTOMATED COUNT: 12.8 % (ref 11.5–14.5)
EST. GFR  (AFRICAN AMERICAN): >60 ML/MIN/1.73 M^2
EST. GFR  (NON AFRICAN AMERICAN): 54.5 ML/MIN/1.73 M^2
GLUCOSE SERPL-MCNC: 108 MG/DL (ref 70–110)
HCT VFR BLD AUTO: 32.8 % (ref 37–48.5)
HGB BLD-MCNC: 10.8 G/DL (ref 12–16)
MAGNESIUM SERPL-MCNC: 1.9 MG/DL (ref 1.6–2.6)
MCH RBC QN AUTO: 29.6 PG (ref 27–31)
MCHC RBC AUTO-ENTMCNC: 32.9 G/DL (ref 32–36)
MCV RBC AUTO: 90 FL (ref 82–98)
PLATELET # BLD AUTO: 296 K/UL (ref 150–450)
PMV BLD AUTO: 9 FL (ref 9.2–12.9)
POTASSIUM SERPL-SCNC: 3.6 MMOL/L (ref 3.5–5.1)
RBC # BLD AUTO: 3.65 M/UL (ref 4–5.4)
SODIUM SERPL-SCNC: 132 MMOL/L (ref 136–145)
WBC # BLD AUTO: 10.31 K/UL (ref 3.9–12.7)

## 2022-06-02 PROCEDURE — 85027 COMPLETE CBC AUTOMATED: CPT | Performed by: INTERNAL MEDICINE

## 2022-06-02 PROCEDURE — 63600175 PHARM REV CODE 636 W HCPCS: Performed by: INTERNAL MEDICINE

## 2022-06-02 PROCEDURE — 99233 SBSQ HOSP IP/OBS HIGH 50: CPT | Mod: ,,, | Performed by: SURGERY

## 2022-06-02 PROCEDURE — 80048 BASIC METABOLIC PNL TOTAL CA: CPT | Performed by: INTERNAL MEDICINE

## 2022-06-02 PROCEDURE — 99223 1ST HOSP IP/OBS HIGH 75: CPT | Mod: ,,, | Performed by: INTERNAL MEDICINE

## 2022-06-02 PROCEDURE — 83735 ASSAY OF MAGNESIUM: CPT | Performed by: INTERNAL MEDICINE

## 2022-06-02 PROCEDURE — 36415 COLL VENOUS BLD VENIPUNCTURE: CPT | Performed by: INTERNAL MEDICINE

## 2022-06-02 PROCEDURE — 12000002 HC ACUTE/MED SURGE SEMI-PRIVATE ROOM

## 2022-06-02 PROCEDURE — 99233 PR SUBSEQUENT HOSPITAL CARE,LEVL III: ICD-10-PCS | Mod: ,,, | Performed by: SURGERY

## 2022-06-02 PROCEDURE — 25000003 PHARM REV CODE 250: Performed by: INTERNAL MEDICINE

## 2022-06-02 PROCEDURE — 99223 PR INITIAL HOSPITAL CARE,LEVL III: ICD-10-PCS | Mod: ,,, | Performed by: INTERNAL MEDICINE

## 2022-06-02 RX ORDER — FLUCONAZOLE 100 MG/1
200 TABLET ORAL DAILY
Status: DISCONTINUED | OUTPATIENT
Start: 2022-06-03 | End: 2022-06-08 | Stop reason: HOSPADM

## 2022-06-02 RX ADMIN — PIPERACILLIN SODIUM,TAZOBACTAM SODIUM 3.38 G: 3; .375 INJECTION, POWDER, FOR SOLUTION INTRAVENOUS at 09:06

## 2022-06-02 RX ADMIN — CARVEDILOL 6.25 MG: 6.25 TABLET, FILM COATED ORAL at 09:06

## 2022-06-02 RX ADMIN — LOSARTAN POTASSIUM 50 MG: 50 TABLET, FILM COATED ORAL at 08:06

## 2022-06-02 RX ADMIN — CARVEDILOL 6.25 MG: 6.25 TABLET, FILM COATED ORAL at 08:06

## 2022-06-02 RX ADMIN — PIPERACILLIN SODIUM,TAZOBACTAM SODIUM 3.38 G: 3; .375 INJECTION, POWDER, FOR SOLUTION INTRAVENOUS at 02:06

## 2022-06-02 RX ADMIN — EZETIMIBE 10 MG: 10 TABLET ORAL at 08:06

## 2022-06-02 RX ADMIN — HYDROCHLOROTHIAZIDE 12.5 MG: 12.5 TABLET ORAL at 08:06

## 2022-06-02 RX ADMIN — AMLODIPINE BESYLATE 5 MG: 5 TABLET ORAL at 08:06

## 2022-06-02 RX ADMIN — ATORVASTATIN CALCIUM 40 MG: 40 TABLET, FILM COATED ORAL at 08:06

## 2022-06-02 RX ADMIN — PANTOPRAZOLE SODIUM 40 MG: 40 TABLET, DELAYED RELEASE ORAL at 05:06

## 2022-06-02 RX ADMIN — Medication 5000 UNITS: at 08:06

## 2022-06-02 RX ADMIN — PIPERACILLIN SODIUM,TAZOBACTAM SODIUM 3.38 G: 3; .375 INJECTION, POWDER, FOR SOLUTION INTRAVENOUS at 06:06

## 2022-06-02 NOTE — H&P (VIEW-ONLY)
Consult Note  Infectious Disease    Reason for Consult:  Diverticulitis with abscess    HPI: Tayla Ewing is a 77 y.o. female was admitted in early April with acute diverticulitis and microperforation.  She received in hospital care for several days then 10 days of IV ertapenem daily at.  She saw General surgery in follow-up with complete resolution for midline removal.  on 05/28 she requested treatment tract infection care unfortunately she had not yet established.  She presented to the emergency room on 05/31 with complaints urinary frequency and abdominal of 2 weeks duration.  Her evaluation in emergency included a urinalysis which was unremarkable, CT scan of the thick-walled collection of gas in left pelvis intimately associated the superior margin of the bladder suspicious for abscess (3 cm in longest dimension) and findings of acute diverticulitis in sigmoid colon, slightly less extensive than that April.  [facility] was [facility] on Zosyn.  She was seen by General surgery yesterday who recommended drainage.  Because she is Brilinta, she will need to be off this for 5 days plus the weekend this procedure Monday June 6th.  She has no fever and white blood cells which were 12,900 on admission 10,300 today.  Blood cultures are negative.      Review of patient's allergies indicates:  No Known Allergies  Past Medical History:   Diagnosis Date    Diverticulitis of colon with perforation 04/05/2022    History of heart artery stent 9/6/2018    History of heart attack 9/6/2018    Hypertension     Intra-abdominal abscess 05/31/2022    post diverticulitis    Multiple sclerosis     Neurogenic bladder 6/24/2015     Past Surgical History:   Procedure Laterality Date    BRAIN BIOPSY      2009    HYSTERECTOMY       Social History     Socioeconomic History    Marital status:    Tobacco Use    Smoking status: Never Smoker    Smokeless tobacco: Never Used   Substance and Sexual Activity     Alcohol use: No     Family History   Problem Relation Age of Onset    Hypertension Mother          Review of Systems:   No chills, fever, sweats,    No change in vision,    hard of hearing  No pain in mouth or throat. No problems with teeth, gums.  No chest pain,  syncope  No  shortness of breath, dyspnea on exertion,   No nausea, vomiting, diarrhea, constipation, blood in stool, and improving left lower quadrant abd pain,     Has had a colonoscopy many years ago. Has seen Dr. Gibbons in the last 2 months  No dysuria, hematuria, strangury, but did have discomfort in the lower abdomen when she voids  She did have some vaginitis at home recently.  No swelling of joints, redness of joints, injuries, or new focal pain  No unusual headaches, , falls  No anxiety, depression, substance abuse, sleep disturbance  No diabetes,  No bleeding, lymphadenopathy, anemia, malignancy, unusual bruising  No new rashes, lesions, or wounds    No recent/prior steroids    Travel:   Implants: none  Antibiotic History: see HPI    EXAM & DIAGNOSTICS REVIEWED:   Vitals:     Temp:  [97.8 °F (36.6 °C)-98.2 °F (36.8 °C)]   Temp: 98.2 °F (36.8 °C) (06/02/22 1145)  Pulse: 86 (06/02/22 1145)  Resp: 18 (06/02/22 1145)  BP: 138/66 (06/02/22 1145)  SpO2: 96 % (06/02/22 1145)    Intake/Output Summary (Last 24 hours) at 6/2/2022 1512  Last data filed at 6/2/2022 0500  Gross per 24 hour   Intake 821.25 ml   Output --   Net 821.25 ml       General:  In NAD. Alert and attentive, cooperative, comfortable  Eyes:  Anicteric, PERRL, EOMI  ENT:  No ulcers, exudates, thrush, nares patent, dentition is good. Hard of hearing  Neck:  supple, no masses or adenopathy appreciated  Lungs: Clear, no consolidation, rales, wheezes, rub  Heart:  RRR, no gallop/murmur/rub noted  Abd:  Soft, very minimal discomfort LLQ,  ND, normal BS, no masses or organomegaly appreciated.  :  Voids  no flank tenderness  Musc:  Joints without effusion, swelling, erythema, synovitis, muscle  wasting.   Skin:  No rashes. No palmar or plantar lesions. No subungual petechiae  Neuro:             Alert, attentive, speech fluent, face symmetric, moves all extremities, no focal weakness. Ambulatory  Psych:  Calm, cooperative  Lymphatic:     No cervical, supraclavicular,   nodes  Extrem: No edema, erythema, phlebitis, cellulitis, warm and well perfused  VAD:  peripheral     Isolation:  none  Wound:            General Labs reviewed:  Recent Labs   Lab 05/31/22  1523 06/01/22  0509 06/02/22  0338   WBC 12.94* 8.01 10.31   HGB 11.7* 11.0* 10.8*   HCT 33.9* 32.7* 32.8*    298 296       Recent Labs   Lab 05/31/22  1523 06/01/22  0508 06/02/22  0338   * 137 132*   K 3.5 3.7 3.6    104 102   CO2 22* 22* 21*   BUN 14 13 18   CREATININE 0.7 0.6 1.0   CALCIUM 9.4 9.4 8.9   PROT 7.9  --   --    BILITOT 0.7  --   --    ALKPHOS 77  --   --    ALT 22  --   --    AST 19  --   --      No results for input(s): CRP in the last 168 hours.      Micro:  Microbiology Results (last 7 days)     Procedure Component Value Units Date/Time    Blood Culture #1 **CANNOT BE ORDERED STAT** [[id number]] Collected: 05/31/22 1725    Order Status: Completed Specimen: Blood from Peripheral, Antecubital, Left Updated: 06/01/22 1832     Blood Culture, Routine No Growth to date      No Growth to date    Blood culture #2 **CANNOT BE ORDERED STAT** [[id number]] Collected: 05/31/22 1753    Order Status: Completed Specimen: Blood from Peripheral, Antecubital, Right Updated: 06/01/22 1832     Blood Culture, Routine No Growth to date      No Growth to date          Imaging Reviewed:   CXR   CT abdomen and pelvis 5/31  1. Development of thick-walled collection of gas in left pelvis, intimately associated with the superior bladder, measuring 3.7 x 2.9 by 3 cm, suspicious for abscess.  2. Findings characteristic of acute diverticulitis of the proximal sigmoid colon, slightly less extensive than that on 04/05/2022  CT.    Cardiology:    IMPRESSION & PLAN   1.  Diverticulitis  with micro perforation 4/5 persistent, pelvic abscess      Recommendations:  Continue Zosyn, will add Diflucan orally    Await IR drainage  Anticipation home IV antibiotics for few weeks, serial CT scans,   consideration sigmoid colectomy in the near future, after colonoscopy  Pursue PICC     Medical Decision Making during this encounter was  [_] Low Complexity  [_] Moderate Complexity  [  ] High Complexity

## 2022-06-02 NOTE — PROGRESS NOTES
[facility]  General Surgery  Progress Note    Subjective:     History of Present Illness:  77-year-old female admitted with diverticulitis.  She was in the hospital last month with acute diverticulitis which resolved and was sent home with oral antibiotics.  The last couple weeks she has complained of a sense of dysuria.  Urinalysis was unremarkable.  CT scan did reveal a 3 cm abscess anterior to the bladder.  This appears to be diverticular.  There is no free perforation.  She feels much better today.  White blood cell count is now normal.  She is afebrile.  She has minimal if any abdominal pain.      Post-Op Info:  * No surgery found *         Interval History: Feels better today    Medications:  Continuous Infusions:  Scheduled Meds:   amLODIPine  5 mg Oral Daily    atorvastatin  40 mg Oral Daily    carvediloL  6.25 mg Oral BID    cholecalciferol (vitamin D3)  5,000 Units Oral Daily    enoxaparin  40 mg Subcutaneous Daily    ezetimibe  10 mg Oral Daily    fluticasone propionate  1 spray Each Nostril Daily    hydroCHLOROthiazide  12.5 mg Oral Daily    losartan  50 mg Oral Daily    pantoprazole  40 mg Oral Daily    piperacillin-tazobactam (ZOSYN) IVPB  3.375 g Intravenous Q8H     PRN Meds:acetaminophen, acetaminophen, gabapentin, magnesium oxide, magnesium oxide, melatonin, naloxone, ondansetron, polyethylene glycol, potassium bicarbonate, potassium bicarbonate, potassium bicarbonate, potassium, sodium phosphates, potassium, sodium phosphates, potassium, sodium phosphates, tiZANidine     Review of patient's allergies indicates:  No Known Allergies  Objective:     Vital Signs (Most Recent):  Temp: 98.2 °F (36.8 °C) (06/02/22 1145)  Pulse: 86 (06/02/22 1145)  Resp: 18 (06/02/22 1145)  BP: 138/66 (06/02/22 1145)  SpO2: 96 % (06/02/22 1145) Vital Signs (24h Range):  Temp:  [97.8 °F (36.6 °C)-98.2 °F (36.8 °C)] 98.2 °F (36.8 °C)  Pulse:  [62-86] 86  Resp:  [18-48] 18  SpO2:  [96 %-98 %] 96  %  BP: ()/(45-66) 138/66     Weight: 67.5 kg (148 lb 13 oz)  Body mass index is 24.76 kg/m².    Intake/Output - Last 3 Shifts         05/31 0700 06/01 0659 06/01 0700 06/02 0659 06/02 0700 06/03 0659    P.O. 240 740     IV Piggyback 50 81.3     Total Intake(mL/kg) 290 (4.3) 821.3 (12.2)     Net +290 +821.3            Urine Occurrence 2 x 9 x     Stool Occurrence 0 x 0 x     Emesis Occurrence 0 x 0 x             Physical Exam  Abdominal:      General: There is no distension.      Tenderness: There is abdominal tenderness (Minimal tenderness.). There is no rebound.       Significant Labs:  I have reviewed all pertinent lab results within the past 24 hours.  CBC:   Recent Labs   Lab 06/02/22  0338   WBC 10.31   RBC 3.65*   HGB 10.8*   HCT 32.8*      MCV 90   MCH 29.6   MCHC 32.9     BMP:   Recent Labs   Lab 06/02/22  0338      *   K 3.6      CO2 21*   BUN 18   CREATININE 1.0   CALCIUM 8.9   MG 1.9     Assessment/Plan:     * Intra-abdominal abscess  1. Diverticular abscess.  2. Patient is relatively asymptomatic and stable at this time.  3. Waiting for percutaneous drainage.  4. Expect will need segmental resection on elective basis once abscess resolves.        Fortunato Arcos MD  General Surgery  [facility]

## 2022-06-02 NOTE — SUBJECTIVE & OBJECTIVE
Interval History: Feels better today    Medications:  Continuous Infusions:  Scheduled Meds:   amLODIPine  5 mg Oral Daily    atorvastatin  40 mg Oral Daily    carvediloL  6.25 mg Oral BID    cholecalciferol (vitamin D3)  5,000 Units Oral Daily    enoxaparin  40 mg Subcutaneous Daily    ezetimibe  10 mg Oral Daily    fluticasone propionate  1 spray Each Nostril Daily    hydroCHLOROthiazide  12.5 mg Oral Daily    losartan  50 mg Oral Daily    pantoprazole  40 mg Oral Daily    piperacillin-tazobactam (ZOSYN) IVPB  3.375 g Intravenous Q8H     PRN Meds:acetaminophen, acetaminophen, gabapentin, magnesium oxide, magnesium oxide, melatonin, naloxone, ondansetron, polyethylene glycol, potassium bicarbonate, potassium bicarbonate, potassium bicarbonate, potassium, sodium phosphates, potassium, sodium phosphates, potassium, sodium phosphates, tiZANidine     Review of patient's allergies indicates:  No Known Allergies  Objective:     Vital Signs (Most Recent):  Temp: 98.2 °F (36.8 °C) (06/02/22 1145)  Pulse: 86 (06/02/22 1145)  Resp: 18 (06/02/22 1145)  BP: 138/66 (06/02/22 1145)  SpO2: 96 % (06/02/22 1145) Vital Signs (24h Range):  Temp:  [97.8 °F (36.6 °C)-98.2 °F (36.8 °C)] 98.2 °F (36.8 °C)  Pulse:  [62-86] 86  Resp:  [18-48] 18  SpO2:  [96 %-98 %] 96 %  BP: ()/(45-66) 138/66     Weight: 67.5 kg (148 lb 13 oz)  Body mass index is 24.76 kg/m².    Intake/Output - Last 3 Shifts         05/31 0700  06/01 0659 06/01 0700  06/02 0659 06/02 0700  06/03 0659    P.O. 240 740     IV Piggyback 50 81.3     Total Intake(mL/kg) 290 (4.3) 821.3 (12.2)     Net +290 +821.3            Urine Occurrence 2 x 9 x     Stool Occurrence 0 x 0 x     Emesis Occurrence 0 x 0 x             Physical Exam  Abdominal:      General: There is no distension.      Tenderness: There is abdominal tenderness (Minimal tenderness.). There is no rebound.       Significant Labs:  I have reviewed all pertinent lab results within the past 24 hours.  CBC:    Recent Labs   Lab 06/02/22  0338   WBC 10.31   RBC 3.65*   HGB 10.8*   HCT 32.8*      MCV 90   MCH 29.6   MCHC 32.9     BMP:   Recent Labs   Lab 06/02/22 0338      *   K 3.6      CO2 21*   BUN 18   CREATININE 1.0   CALCIUM 8.9   MG 1.9

## 2022-06-02 NOTE — PROGRESS NOTES
[facility] Medicine Progress Note  Patient Name: Tayla Ewing MRN: [id number]   Patient Class: IP- Inpatient  Length of Stay: 2   Admission Date: 5/31/2022  1:58 PM Attending Physician: Bruce Ham MD   Primary Care Provider: Marcel Mueller MD Face-to-Face encounter date: 06/02/2022   Chief Complaint: Urinary Frequency (With abdominal pain)    Assessment & Plan:   Tayla Ewing is a 77 y.o. female admitted for left lower quadrant pain/intra-abdominal abscess    Active Problems/Assessment & Plan  1. Intra-abdominal abscess  Recent diverticulitis of proximal colon with microperforation-recent IV antibiotics  Zosyn  Surgery recommended percutaneous drainage   IR requesting 5 days off Brilinta, aspirin Currently NPO  Pain resolved  Consult to Infectious Disease.  five days off of antiplatelets would put IR procedure on Saturday.  Because of their work hours, the patient would not get her procedure until Monday.  Question for infectious diseases with her outpatient treatment with oral versus IV antibiotics would be reasonable option versus continued IV antibiotics inpatient until she gets definitive procedure.            Core measures:  - Code status: full code   - Consultants:  General surgery  - Diet:  NPO  - DVT PPx:  Lovenox  - lines: PIV       Hospital Course     06/02/2022          Discharge Planning:   No mobility needs. Patient will be discharged in     PT C/S for debility/fraility/deconditioning and assistance in discharge needs.     Subjective:    Interval History   Patient is doing better today with i resolution of abdominal pain.   Denies chest pain, shortness of breath, palpitations, abdominal pain, nausea/vomiting.   No concerns/issues overnight reported by the patient or the nursing staff.  Reviewed the labs and discussed the plan of care.   No family present at bedside.     Review of Systems   All other Review of Systems were found to be negative expect for that mentioned  "already in HPI.   Objective:   Physical Exam  /60   Pulse 62   Temp 98.2 °F (36.8 °C)   Resp 18   Ht 5' 5" (1.651 m)   Wt 67.5 kg (148 lb 13 oz)   SpO2 98%   Breastfeeding No   BMI 24.76 kg/m²   Vitals reviewed.    General:  Alert and oriented x4.  No acute distress  HEENT:  Normocephalic  Cardiovascular:  Regular rate and rhythm, no murmurs rubs or gallops.  No lower extremity edema.  Pulmonary:  Clear to auscultation bilaterally  Abdomen:  Soft, nontender, nondistended.  No guarding.  No rebound.  Negative Olmedo's.  Positive bowel sounds.  Extremity:  Moves all extremities equally.  Dermatology:  No rashes appreciated on exposed skin  Psychiatric:  Normal affect    Following labs were Reviewed   Recent Labs   Lab 06/02/22  0338   WBC 10.31   HGB 10.8*   HCT 32.8*      CALCIUM 8.9   *   K 3.6   CO2 21*      BUN 18   CREATININE 1.0     No results found for: POCTGLUCOSE     BMP:   Recent Labs   Lab 05/31/22  1523 06/01/22  0508 06/02/22  0338    103 108   * 137 132*   K 3.5 3.7 3.6    104 102   CO2 22* 22* 21*   BUN 14 13 18   CREATININE 0.7 0.6 1.0   CALCIUM 9.4 9.4 8.9   MG  --  1.8 1.9   , CBC   Recent Labs   Lab 05/31/22  1523 06/01/22  0509 06/02/22  0338   WBC 12.94* 8.01 10.31   HGB 11.7* 11.0* 10.8*   HCT 33.9* 32.7* 32.8*    298 296    and All labs within the past 24 hours have been reviewed  Microbiology Results (last 7 days)     Procedure Component Value Units Date/Time    Blood Culture #1 **CANNOT BE ORDERED STAT** [[id number]] Collected: 05/31/22 1725    Order Status: Completed Specimen: Blood from Peripheral, Antecubital, Left Updated: 06/01/22 1832     Blood Culture, Routine No Growth to date      No Growth to date    Blood culture #2 **CANNOT BE ORDERED STAT** [[id number]] Collected: 05/31/22 1753    Order Status: Completed Specimen: Blood from Peripheral, Antecubital, Right Updated: 06/01/22 1832     Blood Culture, Routine No Growth to date    "   No Growth to date        CT Abdomen Pelvis  Without Contrast   Final Result      1. Development of thick-walled collection of gas in left pelvis, intimately associated with the superior bladder, measuring 3.7 x 2.9 by 3 cm, suspicious for abscess.   2. Findings characteristic of acute diverticulitis of the proximal sigmoid colon, slightly less extensive than that on 04/05/2022 CT.         Electronically signed by: Nawaf Lala MD   Date:    05/31/2022   Time:    16:43      CT Guided Abscess Drainage With Catheter    (Results Pending)       Inpatient medications  Scheduled Meds:   amLODIPine  5 mg Oral Daily    atorvastatin  40 mg Oral Daily    carvediloL  6.25 mg Oral BID    cholecalciferol (vitamin D3)  5,000 Units Oral Daily    enoxaparin  40 mg Subcutaneous Daily    ezetimibe  10 mg Oral Daily    fluticasone propionate  1 spray Each Nostril Daily    hydroCHLOROthiazide  12.5 mg Oral Daily    losartan  50 mg Oral Daily    pantoprazole  40 mg Oral Daily    piperacillin-tazobactam (ZOSYN) IVPB  3.375 g Intravenous Q8H     Continuous Infusions:  PRN Meds:.acetaminophen, acetaminophen, gabapentin, magnesium oxide, magnesium oxide, melatonin, naloxone, ondansetron, polyethylene glycol, potassium bicarbonate, potassium bicarbonate, potassium bicarbonate, potassium, sodium phosphates, potassium, sodium phosphates, potassium, sodium phosphates, tiZANidine    Above encounter included review of the medical records, interviewing and examining the patient face-to-face, discussion with family and other health care providers, ordering and interpreting lab/test results and formulating a plan of care.     Medical Decision Making:    [] Low Complexity  [x] Moderate Complexity  [] High Complexity    Bruce Ham  [facility] Hospitalist  06/02/2022

## 2022-06-02 NOTE — CONSULTS
Consult Note  Infectious Disease    Reason for Consult:  Diverticulitis with abscess    HPI: Tayla Ewing is a 77 y.o. female was admitted in early April with acute diverticulitis and microperforation.  She received in hospital care for several days then 10 days of IV ertapenem daily at.  She saw General surgery in follow-up with complete resolution for midline removal.  on 05/28 she requested treatment tract infection care unfortunately she had not yet established.  She presented to the emergency room on 05/31 with complaints urinary frequency and abdominal of 2 weeks duration.  Her evaluation in emergency included a urinalysis which was unremarkable, CT scan of the thick-walled collection of gas in left pelvis intimately associated the superior margin of the bladder suspicious for abscess (3 cm in longest dimension) and findings of acute diverticulitis in sigmoid colon, slightly less extensive than that April.  She was admitted to [facility] on Zosyn.  She was seen by General surgery yesterday who recommended drainage.  Because she is Brilinta, she will need to be off this for 5 days plus the weekend this procedure Monday June 6th.  She has no fever and white blood cells which were 12,900 on admission 10,300 today.  Blood cultures are negative.      Review of patient's allergies indicates:  No Known Allergies  Past Medical History:   Diagnosis Date    Diverticulitis of colon with perforation 04/05/2022    History of heart artery stent 9/6/2018    History of heart attack 9/6/2018    Hypertension     Intra-abdominal abscess 05/31/2022    post diverticulitis    Multiple sclerosis     Neurogenic bladder 6/24/2015     Past Surgical History:   Procedure Laterality Date    BRAIN BIOPSY      2009    HYSTERECTOMY       Social History     Socioeconomic History    Marital status:    Tobacco Use    Smoking status: Never Smoker    Smokeless tobacco: Never Used   Substance and Sexual Activity     Alcohol use: No     Family History   Problem Relation Age of Onset    Hypertension Mother          Review of Systems:   No chills, fever, sweats,    No change in vision,    hard of hearing  No pain in mouth or throat. No problems with teeth, gums.  No chest pain,  syncope  No  shortness of breath, dyspnea on exertion,   No nausea, vomiting, diarrhea, constipation, blood in stool, and improving left lower quadrant abd pain,     Has had a colonoscopy many years ago. Has seen Dr. Gibbons in the last 2 months  No dysuria, hematuria, strangury, but did have discomfort in the lower abdomen when she voids  She did have some vaginitis at home recently.  No swelling of joints, redness of joints, injuries, or new focal pain  No unusual headaches, , falls  No anxiety, depression, substance abuse, sleep disturbance  No diabetes,  No bleeding, lymphadenopathy, anemia, malignancy, unusual bruising  No new rashes, lesions, or wounds    No recent/prior steroids    Travel:   Implants: none  Antibiotic History: see HPI    EXAM & DIAGNOSTICS REVIEWED:   Vitals:     Temp:  [97.8 °F (36.6 °C)-98.2 °F (36.8 °C)]   Temp: 98.2 °F (36.8 °C) (06/02/22 1145)  Pulse: 86 (06/02/22 1145)  Resp: 18 (06/02/22 1145)  BP: 138/66 (06/02/22 1145)  SpO2: 96 % (06/02/22 1145)    Intake/Output Summary (Last 24 hours) at 6/2/2022 1512  Last data filed at 6/2/2022 0500  Gross per 24 hour   Intake 821.25 ml   Output --   Net 821.25 ml       General:  In NAD. Alert and attentive, cooperative, comfortable  Eyes:  Anicteric, PERRL, EOMI  ENT:  No ulcers, exudates, thrush, nares patent, dentition is good. Hard of hearing  Neck:  supple, no masses or adenopathy appreciated  Lungs: Clear, no consolidation, rales, wheezes, rub  Heart:  RRR, no gallop/murmur/rub noted  Abd:  Soft, very minimal discomfort LLQ,  ND, normal BS, no masses or organomegaly appreciated.  :  Voids  no flank tenderness  Musc:  Joints without effusion, swelling, erythema, synovitis, muscle  wasting.   Skin:  No rashes. No palmar or plantar lesions. No subungual petechiae  Neuro:             Alert, attentive, speech fluent, face symmetric, moves all extremities, no focal weakness. Ambulatory  Psych:  Calm, cooperative  Lymphatic:     No cervical, supraclavicular,   nodes  Extrem: No edema, erythema, phlebitis, cellulitis, warm and well perfused  VAD:  peripheral     Isolation:  none  Wound:            General Labs reviewed:  Recent Labs   Lab 05/31/22  1523 06/01/22  0509 06/02/22  0338   WBC 12.94* 8.01 10.31   HGB 11.7* 11.0* 10.8*   HCT 33.9* 32.7* 32.8*    298 296       Recent Labs   Lab 05/31/22  1523 06/01/22  0508 06/02/22  0338   * 137 132*   K 3.5 3.7 3.6    104 102   CO2 22* 22* 21*   BUN 14 13 18   CREATININE 0.7 0.6 1.0   CALCIUM 9.4 9.4 8.9   PROT 7.9  --   --    BILITOT 0.7  --   --    ALKPHOS 77  --   --    ALT 22  --   --    AST 19  --   --      No results for input(s): CRP in the last 168 hours.      Micro:  Microbiology Results (last 7 days)     Procedure Component Value Units Date/Time    Blood Culture #1 **CANNOT BE ORDERED STAT** [[id number]] Collected: 05/31/22 1725    Order Status: Completed Specimen: Blood from Peripheral, Antecubital, Left Updated: 06/01/22 1832     Blood Culture, Routine No Growth to date      No Growth to date    Blood culture #2 **CANNOT BE ORDERED STAT** [[id number]] Collected: 05/31/22 1753    Order Status: Completed Specimen: Blood from Peripheral, Antecubital, Right Updated: 06/01/22 1832     Blood Culture, Routine No Growth to date      No Growth to date          Imaging Reviewed:   CXR   CT abdomen and pelvis 5/31  1. Development of thick-walled collection of gas in left pelvis, intimately associated with the superior bladder, measuring 3.7 x 2.9 by 3 cm, suspicious for abscess.  2. Findings characteristic of acute diverticulitis of the proximal sigmoid colon, slightly less extensive than that on 04/05/2022  CT.    Cardiology:    IMPRESSION & PLAN   1.  Diverticulitis  with micro perforation 4/5 persistent, pelvic abscess      Recommendations:  Continue Zosyn, will add Diflucan orally    Await IR drainage  Anticipation home IV antibiotics for few weeks, serial CT scans,   consideration sigmoid colectomy in the near future, after colonoscopy  Pursue PICC     Medical Decision Making during this encounter was  [_] Low Complexity  [_] Moderate Complexity  [  ] High Complexity

## 2022-06-02 NOTE — ASSESSMENT & PLAN NOTE
1. Diverticular abscess.  2. Patient is relatively asymptomatic and stable at this time.  3. Waiting for percutaneous drainage.  4. Expect will need segmental resection on elective basis once abscess resolves.

## 2022-06-03 LAB
ANION GAP SERPL CALC-SCNC: 12 MMOL/L (ref 8–16)
BUN SERPL-MCNC: 15 MG/DL (ref 8–23)
CALCIUM SERPL-MCNC: 9.5 MG/DL (ref 8.7–10.5)
CHLORIDE SERPL-SCNC: 101 MMOL/L (ref 95–110)
CO2 SERPL-SCNC: 22 MMOL/L (ref 23–29)
CREAT SERPL-MCNC: 0.9 MG/DL (ref 0.5–1.4)
CRP SERPL-MCNC: 0.13 MG/DL
ERYTHROCYTE [DISTWIDTH] IN BLOOD BY AUTOMATED COUNT: 12.8 % (ref 11.5–14.5)
EST. GFR  (AFRICAN AMERICAN): >60 ML/MIN/1.73 M^2
EST. GFR  (NON AFRICAN AMERICAN): >60 ML/MIN/1.73 M^2
GLUCOSE SERPL-MCNC: 105 MG/DL (ref 70–110)
HCT VFR BLD AUTO: 32.8 % (ref 37–48.5)
HGB BLD-MCNC: 10.8 G/DL (ref 12–16)
INR PPP: 1.2
MAGNESIUM SERPL-MCNC: 1.8 MG/DL (ref 1.6–2.6)
MCH RBC QN AUTO: 29.4 PG (ref 27–31)
MCHC RBC AUTO-ENTMCNC: 32.9 G/DL (ref 32–36)
MCV RBC AUTO: 89 FL (ref 82–98)
PLATELET # BLD AUTO: 294 K/UL (ref 150–450)
PMV BLD AUTO: 9.2 FL (ref 9.2–12.9)
POTASSIUM SERPL-SCNC: 3.6 MMOL/L (ref 3.5–5.1)
PROTHROMBIN TIME: 14 SEC (ref 11.4–13.7)
RBC # BLD AUTO: 3.67 M/UL (ref 4–5.4)
SODIUM SERPL-SCNC: 135 MMOL/L (ref 136–145)
WBC # BLD AUTO: 9 K/UL (ref 3.9–12.7)

## 2022-06-03 PROCEDURE — 36415 COLL VENOUS BLD VENIPUNCTURE: CPT | Performed by: FAMILY MEDICINE

## 2022-06-03 PROCEDURE — 99232 SBSQ HOSP IP/OBS MODERATE 35: CPT | Mod: ,,, | Performed by: SURGERY

## 2022-06-03 PROCEDURE — 25000003 PHARM REV CODE 250: Performed by: INTERNAL MEDICINE

## 2022-06-03 PROCEDURE — 85027 COMPLETE CBC AUTOMATED: CPT | Performed by: INTERNAL MEDICINE

## 2022-06-03 PROCEDURE — 99231 SBSQ HOSP IP/OBS SF/LOW 25: CPT | Mod: ,,, | Performed by: INTERNAL MEDICINE

## 2022-06-03 PROCEDURE — 99232 PR SUBSEQUENT HOSPITAL CARE,LEVL II: ICD-10-PCS | Mod: ,,, | Performed by: SURGERY

## 2022-06-03 PROCEDURE — 83735 ASSAY OF MAGNESIUM: CPT | Performed by: INTERNAL MEDICINE

## 2022-06-03 PROCEDURE — 85610 PROTHROMBIN TIME: CPT | Performed by: RADIOLOGY

## 2022-06-03 PROCEDURE — 63600175 PHARM REV CODE 636 W HCPCS: Performed by: INTERNAL MEDICINE

## 2022-06-03 PROCEDURE — 36415 COLL VENOUS BLD VENIPUNCTURE: CPT | Performed by: INTERNAL MEDICINE

## 2022-06-03 PROCEDURE — 99231 PR SUBSEQUENT HOSPITAL CARE,LEVL I: ICD-10-PCS | Mod: ,,, | Performed by: INTERNAL MEDICINE

## 2022-06-03 PROCEDURE — 12000002 HC ACUTE/MED SURGE SEMI-PRIVATE ROOM

## 2022-06-03 PROCEDURE — 36415 COLL VENOUS BLD VENIPUNCTURE: CPT | Performed by: RADIOLOGY

## 2022-06-03 PROCEDURE — 86140 C-REACTIVE PROTEIN: CPT | Performed by: FAMILY MEDICINE

## 2022-06-03 PROCEDURE — 63700000 PHARM REV CODE 250 ALT 637 W/O HCPCS: Performed by: INTERNAL MEDICINE

## 2022-06-03 PROCEDURE — 36569 INSJ PICC 5 YR+ W/O IMAGING: CPT

## 2022-06-03 PROCEDURE — 80048 BASIC METABOLIC PNL TOTAL CA: CPT | Performed by: INTERNAL MEDICINE

## 2022-06-03 RX ADMIN — HYDROCHLOROTHIAZIDE 12.5 MG: 12.5 TABLET ORAL at 10:06

## 2022-06-03 RX ADMIN — EZETIMIBE 10 MG: 10 TABLET ORAL at 10:06

## 2022-06-03 RX ADMIN — ATORVASTATIN CALCIUM 40 MG: 40 TABLET, FILM COATED ORAL at 10:06

## 2022-06-03 RX ADMIN — CARVEDILOL 6.25 MG: 6.25 TABLET, FILM COATED ORAL at 10:06

## 2022-06-03 RX ADMIN — PIPERACILLIN SODIUM,TAZOBACTAM SODIUM 3.38 G: 3; .375 INJECTION, POWDER, FOR SOLUTION INTRAVENOUS at 06:06

## 2022-06-03 RX ADMIN — FLUCONAZOLE 200 MG: 100 TABLET ORAL at 10:06

## 2022-06-03 RX ADMIN — LOSARTAN POTASSIUM 50 MG: 50 TABLET, FILM COATED ORAL at 10:06

## 2022-06-03 RX ADMIN — AMLODIPINE BESYLATE 5 MG: 5 TABLET ORAL at 10:06

## 2022-06-03 RX ADMIN — PIPERACILLIN SODIUM,TAZOBACTAM SODIUM 3.38 G: 3; .375 INJECTION, POWDER, FOR SOLUTION INTRAVENOUS at 10:06

## 2022-06-03 RX ADMIN — PANTOPRAZOLE SODIUM 40 MG: 40 TABLET, DELAYED RELEASE ORAL at 06:06

## 2022-06-03 RX ADMIN — CARVEDILOL 6.25 MG: 6.25 TABLET, FILM COATED ORAL at 09:06

## 2022-06-03 RX ADMIN — PIPERACILLIN SODIUM,TAZOBACTAM SODIUM 3.38 G: 3; .375 INJECTION, POWDER, FOR SOLUTION INTRAVENOUS at 02:06

## 2022-06-03 RX ADMIN — TIZANIDINE 2 MG: 2 TABLET ORAL at 09:06

## 2022-06-03 RX ADMIN — Medication 5000 UNITS: at 10:06

## 2022-06-03 NOTE — PROGRESS NOTES
[facility]  General Surgery  Progress Note    Subjective:     History of Present Illness:  77-year-old female admitted with diverticulitis.  She was in the hospital last month with acute diverticulitis which resolved and was sent home with oral antibiotics.  The last couple weeks she has complained of a sense of dysuria.  Urinalysis was unremarkable.  CT scan did reveal a 3 cm abscess anterior to the bladder.  This appears to be diverticular.  There is no free perforation.  She feels much better today.  White blood cell count is now normal.  She is afebrile.  She has minimal if any abdominal pain.      Post-Op Info:  * No surgery found *         Interval History: No complaints.    Medications:  Continuous Infusions:  Scheduled Meds:   amLODIPine  5 mg Oral Daily    atorvastatin  40 mg Oral Daily    carvediloL  6.25 mg Oral BID    cholecalciferol (vitamin D3)  5,000 Units Oral Daily    enoxaparin  40 mg Subcutaneous Daily    ezetimibe  10 mg Oral Daily    fluconazole  200 mg Oral Daily    fluticasone propionate  1 spray Each Nostril Daily    hydroCHLOROthiazide  12.5 mg Oral Daily    losartan  50 mg Oral Daily    pantoprazole  40 mg Oral Daily    piperacillin-tazobactam (ZOSYN) IVPB  3.375 g Intravenous Q8H     PRN Meds:acetaminophen, acetaminophen, gabapentin, magnesium oxide, magnesium oxide, melatonin, naloxone, ondansetron, polyethylene glycol, potassium bicarbonate, potassium bicarbonate, potassium bicarbonate, potassium, sodium phosphates, potassium, sodium phosphates, potassium, sodium phosphates, tiZANidine     Review of patient's allergies indicates:  No Known Allergies  Objective:     Vital Signs (Most Recent):  Temp: 97.3 °F (36.3 °C) (06/03/22 1454)  Pulse: (!) 55 (06/03/22 1454)  Resp: 17 (06/03/22 1454)  BP: (!) 119/59 (06/03/22 1454)  SpO2: 99 % (06/03/22 1454)   Vital Signs (24h Range):  Temp:  [97.3 °F (36.3 °C)-98.3 °F (36.8 °C)] 97.3 °F (36.3 °C)  Pulse:  [52-68]  55  Resp:  [17-18] 17  SpO2:  [95 %-99 %] 99 %  BP: (112-137)/(59-68) 119/59     Weight: 67.5 kg (148 lb 13 oz)  Body mass index is 24.76 kg/m².    Intake/Output - Last 3 Shifts         06/01 0700  06/02 0659 06/02 0700  06/03 0659 06/03 0700  06/04 0659    P.O. 740 120     IV Piggyback 81.3      Total Intake(mL/kg) 821.3 (12.2) 120 (1.8)     Net +821.3 +120            Urine Occurrence 9 x 1 x     Stool Occurrence 0 x      Emesis Occurrence 0 x              Physical Exam  Cardiovascular:      Rate and Rhythm: Normal rate and regular rhythm.   Pulmonary:      Effort: Pulmonary effort is normal.      Breath sounds: Normal breath sounds.   Abdominal:      General: There is no distension.      Tenderness: There is no abdominal tenderness.       Significant Labs:  I have reviewed all pertinent lab results within the past 24 hours.  CBC:   Recent Labs   Lab 06/03/22  0544   WBC 9.00   RBC 3.67*   HGB 10.8*   HCT 32.8*      MCV 89   MCH 29.4   MCHC 32.9     BMP:   Recent Labs   Lab 06/03/22  0544      *   K 3.6      CO2 22*   BUN 15   CREATININE 0.9   CALCIUM 9.5   MG 1.8         Assessment/Plan:     * Intra-abdominal abscess  1. Diverticular abscess.  2. Patient is asymptomatic and stable at this time.  3. Waiting for percutaneous drainage.  4. Expect will need segmental resection on elective basis once abscess resolves.        Fortunato Arcos MD  General Surgery  [facility]

## 2022-06-03 NOTE — PLAN OF CARE
06/03/22 1246   Post-Acute Status   Post-Acute Authorization IV Infusion   Coverage medicare A&B   IV Infusion Status Referral(s) sent   Discharge Delays (!) Post-Acute Set-up   Discharge Plan   Discharge Plan A Home   Discharge Plan B Home with family     SW went to patient room to discuss prior IV service company for referral purposes. Patient sleeping. SW consulted with Spouse (Osei) and daughter for company name used in April for IV Infusions and they did not know. SW asked if they would be ok with referral being sent to a different company, btoh stated it is fine because they do not have a preference. Referral sent to [facility].

## 2022-06-03 NOTE — CARE UPDATE
Osei ([facility]) asked CLAUDIO to try sending referral to [facility] for Infusions due to being out of network with patient insurance. Osei stated he discussed with patient, if patient will be covered by [facility] 100%, then patient can receive services from them. However, if it is not covered 100% with [facility], Osei with [facility] will service patient and provide a payment plan. Osei informed CLAUDIO that patient prefers to receive services from [facility], but Osei wants to see if it possible insurance will cover cost before patient pay out of pocket. CLAUDIO sent referral to [facility] via [employer].

## 2022-06-03 NOTE — PLAN OF CARE
06/03/22 1449   Post-Acute Status   Post-Acute Authorization IV Infusion   Coverage medicare part a&b   IV Infusion Status Referral(s) sent   Discharge Delays (!) Post-Acute Set-up   Discharge Plan   Discharge Plan A Home;Home with family   Discharge Plan B Home;Home with family     Referral sent to [facility]. Osei in route to see patient at bedside.

## 2022-06-03 NOTE — SUBJECTIVE & OBJECTIVE
Interval History: No complaints.    Medications:  Continuous Infusions:  Scheduled Meds:   amLODIPine  5 mg Oral Daily    atorvastatin  40 mg Oral Daily    carvediloL  6.25 mg Oral BID    cholecalciferol (vitamin D3)  5,000 Units Oral Daily    enoxaparin  40 mg Subcutaneous Daily    ezetimibe  10 mg Oral Daily    fluconazole  200 mg Oral Daily    fluticasone propionate  1 spray Each Nostril Daily    hydroCHLOROthiazide  12.5 mg Oral Daily    losartan  50 mg Oral Daily    pantoprazole  40 mg Oral Daily    piperacillin-tazobactam (ZOSYN) IVPB  3.375 g Intravenous Q8H     PRN Meds:acetaminophen, acetaminophen, gabapentin, magnesium oxide, magnesium oxide, melatonin, naloxone, ondansetron, polyethylene glycol, potassium bicarbonate, potassium bicarbonate, potassium bicarbonate, potassium, sodium phosphates, potassium, sodium phosphates, potassium, sodium phosphates, tiZANidine     Review of patient's allergies indicates:  No Known Allergies  Objective:     Vital Signs (Most Recent):  Temp: 97.3 °F (36.3 °C) (06/03/22 1454)  Pulse: (!) 55 (06/03/22 1454)  Resp: 17 (06/03/22 1454)  BP: (!) 119/59 (06/03/22 1454)  SpO2: 99 % (06/03/22 1454)   Vital Signs (24h Range):  Temp:  [97.3 °F (36.3 °C)-98.3 °F (36.8 °C)] 97.3 °F (36.3 °C)  Pulse:  [52-68] 55  Resp:  [17-18] 17  SpO2:  [95 %-99 %] 99 %  BP: (112-137)/(59-68) 119/59     Weight: 67.5 kg (148 lb 13 oz)  Body mass index is 24.76 kg/m².    Intake/Output - Last 3 Shifts         06/01 0700  06/02 0659 06/02 0700  06/03 0659 06/03 0700  06/04 0659    P.O. 740 120     IV Piggyback 81.3      Total Intake(mL/kg) 821.3 (12.2) 120 (1.8)     Net +821.3 +120            Urine Occurrence 9 x 1 x     Stool Occurrence 0 x      Emesis Occurrence 0 x              Physical Exam  Cardiovascular:      Rate and Rhythm: Normal rate and regular rhythm.   Pulmonary:      Effort: Pulmonary effort is normal.      Breath sounds: Normal breath sounds.   Abdominal:      General: There is no  distension.      Tenderness: There is no abdominal tenderness.       Significant Labs:  I have reviewed all pertinent lab results within the past 24 hours.  CBC:   Recent Labs   Lab 06/03/22  0544   WBC 9.00   RBC 3.67*   HGB 10.8*   HCT 32.8*      MCV 89   MCH 29.4   MCHC 32.9     BMP:   Recent Labs   Lab 06/03/22  0544      *   K 3.6      CO2 22*   BUN 15   CREATININE 0.9   CALCIUM 9.5   MG 1.8

## 2022-06-03 NOTE — PROGRESS NOTES
Consult Note  Infectious Disease    Reason for Consult:  Diverticulitis with abscess    HPI: Tayla Ewing is a 77 y.o. female was admitted in early April with acute diverticulitis and microperforation.  She received in hospital care for several days then 10 days of IV ertapenem daily at.  She saw General surgery in follow-up with complete resolution for midline removal.  on 05/28 she requested treatment tract infection care unfortunately she had not yet established.  She presented to the emergency room on 05/31 with complaints urinary frequency and abdominal of 2 weeks duration.  Her evaluation in emergency included a urinalysis which was unremarkable, CT scan of the thick-walled collection of gas in left pelvis intimately associated the superior margin of the bladder suspicious for abscess (3 cm in longest dimension) and findings of acute diverticulitis in sigmoid colon, slightly less extensive than that April.  She was admitted to Medicine [facility] on Zosyn.  She was seen by General surgery yesterday who recommended drainage.  Because she is Brilinta, she will need to be off this for 5 days plus the weekend this procedure Monday June 6th.  She has no fever and white blood cells which were 12,900 on admission 10,300 today.  Blood cultures are negative.    6/3: interim reviewed. picc placed. IR procedure pending Brilinta wearing off. She has no new complaints. Home IV being arranged.     EXAM & DIAGNOSTICS REVIEWED:   Vitals:     Temp:  [97.4 °F (36.3 °C)-98.3 °F (36.8 °C)]   Temp: 97.4 °F (36.3 °C) (06/03/22 1105)  Pulse: (!) 54 (06/03/22 1105)  Resp: 18 (06/03/22 1105)  BP: 118/63 (06/03/22 1105)  SpO2: 97 % (06/03/22 1105)    Intake/Output Summary (Last 24 hours) at 6/3/2022 1446  Last data filed at 6/3/2022 0535  Gross per 24 hour   Intake 120 ml   Output --   Net 120 ml       General:  In NAD. Alert and attentive, cooperative, comfortable  Eyes:  Anicteric, EOMI  ENT:  No ulcers, exudates, thrush, nares  patent, dentition is good. Hard of hearing  Neck:  Supple,   Lungs: Clear, no consolidation, rales, wheezes, rub  Heart:  RRR, no gallop/murmur/rub noted  Abd:  Soft, non toxic  ND, normal BS, no masses or organomegaly appreciated.  :  Voids  no flank tenderness  Musc:  Joints without effusion, swelling, erythema, synovitis, muscle wasting.   Skin:  No rashes.    Neuro:             Alert, attentive, speech fluent, face symmetric, moves all extremities, no focal weakness. Ambulatory  Psych:  Calm, cooperative  Lymphatic:      Extrem: No edema, erythema, phlebitis, cellulitis, warm and well perfused  VAD:  picc line     Isolation:  none  Wound:            General Labs reviewed:  Recent Labs   Lab 06/01/22  0509 06/02/22  0338 06/03/22  0544   WBC 8.01 10.31 9.00   HGB 11.0* 10.8* 10.8*   HCT 32.7* 32.8* 32.8*    296 294       Recent Labs   Lab 05/31/22  1523 06/01/22  0508 06/02/22  0338 06/03/22  0544   * 137 132* 135*   K 3.5 3.7 3.6 3.6    104 102 101   CO2 22* 22* 21* 22*   BUN 14 13 18 15   CREATININE 0.7 0.6 1.0 0.9   CALCIUM 9.4 9.4 8.9 9.5   PROT 7.9  --   --   --    BILITOT 0.7  --   --   --    ALKPHOS 77  --   --   --    ALT 22  --   --   --    AST 19  --   --   --      No results for input(s): CRP in the last 168 hours.      Micro:  Microbiology Results (last 7 days)     Procedure Component Value Units Date/Time    Blood Culture #1 **CANNOT BE ORDERED STAT** [[id number]] Collected: 05/31/22 1725    Order Status: Completed Specimen: Blood from Peripheral, Antecubital, Left Updated: 06/02/22 1832     Blood Culture, Routine No Growth to date      No Growth to date      No Growth to date    Blood culture #2 **CANNOT BE ORDERED STAT** [[id number]] Collected: 05/31/22 1753    Order Status: Completed Specimen: Blood from Peripheral, Antecubital, Right Updated: 06/02/22 1832     Blood Culture, Routine No Growth to date      No Growth to date      No Growth to date          Imaging  Reviewed:   CXR   CT abdomen and pelvis 5/31  1. Development of thick-walled collection of gas in left pelvis, intimately associated with the superior bladder, measuring 3.7 x 2.9 by 3 cm, suspicious for abscess.  2. Findings characteristic of acute diverticulitis of the proximal sigmoid colon, slightly less extensive than that on 04/05/2022 CT.    Cardiology:    IMPRESSION & PLAN   1.  Diverticulitis  with micro perforation 4/5 persistent, pelvic abscess      Recommendations:  Continue Zosyn, will add Diflucan orally    Await IR drainage  Anticipation home IV antibiotics for few weeks, serial CT scans,   consideration sigmoid colectomy in the near future, after colonoscopy      D/w patient,  and daughter(who works here)    Medical Decision Making during this encounter was  [_] Low Complexity  [_] Moderate Complexity  x ] High Complexity

## 2022-06-03 NOTE — ASSESSMENT & PLAN NOTE
1. Diverticular abscess.  2. Patient is asymptomatic and stable at this time.  3. Waiting for percutaneous drainage.  4. Expect will need segmental resection on elective basis once abscess resolves.

## 2022-06-03 NOTE — PROGRESS NOTES
"[facility] Medicine Progress Note  Patient Name: Tayla Ewing MRN: [id number]   Patient Class: IP- Inpatient  Length of Stay: 3   Admission Date: 5/31/2022  1:58 PM Attending Physician: Bruce Ham MD   Primary Care Provider: Marcel Mueller MD Face-to-Face encounter date: 06/03/2022   Chief Complaint: Urinary Frequency (With abdominal pain)    Assessment & Plan:   Tayla Ewing is a 77 y.o. female admitted for left lower quadrant pain/intra-abdominal abscess    Active Problems/Assessment & Plan  1. Intra-abdominal abscess  Recent diverticulitis of proximal colon with microperforation-recent IV antibiotics  Zosyn  Surgery recommended percutaneous drainage   IR requesting 5 days off Brilinta, aspirin Currently NPO  Pain resolved  Infectious disease-.  Continue Zosyn.  PICC placed.  Anticipate need for IV antibiotics for several weeks.    Appreciate all recommendations.          Core measures:  - Code status: full code   - Consultants:  General surgery  - Diet:  NPO  - DVT PPx:  Lovenox  - lines: PIV       Hospital Course     06/03/2022          Discharge Planning:   No mobility needs. Patient will be discharged in     PT C/S for debility/fraility/deconditioning and assistance in discharge needs.     Subjective:    Interval History   Patient continues to be pain-free.  Afebrile.  Denies chest pain, shortness of breath, palpitations, abdominal pain, nausea/vomiting.   No concerns/issues overnight reported by the patient or the nursing staff.  Reviewed the labs and discussed the plan of care.    at bedside..     Review of Systems   All other Review of Systems were found to be negative expect for that mentioned already in HPI.   Objective:   Physical Exam  /63   Pulse (!) 54   Temp 97.4 °F (36.3 °C)   Resp 18   Ht 5' 5" (1.651 m)   Wt 67.5 kg (148 lb 13 oz)   SpO2 97%   Breastfeeding No   BMI 24.76 kg/m²   Vitals reviewed.    General:  Alert and oriented x4.  No acute " distress  HEENT:  Normocephalic  Cardiovascular:  Regular rate and rhythm, no murmurs rubs or gallops.  No lower extremity edema.  Pulmonary:  Clear to auscultation bilaterally  Abdomen:  Soft, nontender, nondistended.  No guarding.  No rebound.  Negative Olmedo's.  Positive bowel sounds.  Extremity:  Moves all extremities equally.  Dermatology:  No rashes appreciated on exposed skin  Psychiatric:  Normal affect    Following labs were Reviewed   Recent Labs   Lab 06/03/22  0544   WBC 9.00   HGB 10.8*   HCT 32.8*      CALCIUM 9.5   *   K 3.6   CO2 22*      BUN 15   CREATININE 0.9     No results found for: POCTGLUCOSE     BMP:   Recent Labs   Lab 06/02/22  0338 06/03/22  0544    105   * 135*   K 3.6 3.6    101   CO2 21* 22*   BUN 18 15   CREATININE 1.0 0.9   CALCIUM 8.9 9.5   MG 1.9 1.8   , CBC   Recent Labs   Lab 06/02/22 0338 06/03/22  0544   WBC 10.31 9.00   HGB 10.8* 10.8*   HCT 32.8* 32.8*    294    and All labs within the past 24 hours have been reviewed  Microbiology Results (last 7 days)     Procedure Component Value Units Date/Time    Blood Culture #1 **CANNOT BE ORDERED STAT** [[id number]] Collected: 05/31/22 1725    Order Status: Completed Specimen: Blood from Peripheral, Antecubital, Left Updated: 06/02/22 1832     Blood Culture, Routine No Growth to date      No Growth to date      No Growth to date    Blood culture #2 **CANNOT BE ORDERED STAT** [[id number]] Collected: 05/31/22 1753    Order Status: Completed Specimen: Blood from Peripheral, Antecubital, Right Updated: 06/02/22 1832     Blood Culture, Routine No Growth to date      No Growth to date      No Growth to date        X-Ray Chest 1 View   Final Result      CT Abdomen Pelvis  Without Contrast   Final Result      1. Development of thick-walled collection of gas in left pelvis, intimately associated with the superior bladder, measuring 3.7 x 2.9 by 3 cm, suspicious for abscess.   2. Findings  characteristic of acute diverticulitis of the proximal sigmoid colon, slightly less extensive than that on 04/05/2022 CT.         Electronically signed by: Nawaf Lala MD   Date:    05/31/2022   Time:    16:43      CT Guided Abscess Drainage With Catheter    (Results Pending)       Inpatient medications  Scheduled Meds:   amLODIPine  5 mg Oral Daily    atorvastatin  40 mg Oral Daily    carvediloL  6.25 mg Oral BID    cholecalciferol (vitamin D3)  5,000 Units Oral Daily    enoxaparin  40 mg Subcutaneous Daily    ezetimibe  10 mg Oral Daily    fluconazole  200 mg Oral Daily    fluticasone propionate  1 spray Each Nostril Daily    hydroCHLOROthiazide  12.5 mg Oral Daily    losartan  50 mg Oral Daily    pantoprazole  40 mg Oral Daily    piperacillin-tazobactam (ZOSYN) IVPB  3.375 g Intravenous Q8H     Continuous Infusions:  PRN Meds:.acetaminophen, acetaminophen, gabapentin, magnesium oxide, magnesium oxide, melatonin, naloxone, ondansetron, polyethylene glycol, potassium bicarbonate, potassium bicarbonate, potassium bicarbonate, potassium, sodium phosphates, potassium, sodium phosphates, potassium, sodium phosphates, tiZANidine    Above encounter included review of the medical records, interviewing and examining the patient face-to-face, discussion with family and other health care providers, ordering and interpreting lab/test results and formulating a plan of care.     Medical Decision Making:    [] Low Complexity  [x] Moderate Complexity  [] High Complexity    Bruce Ham  [facility] Hospitalist  06/03/2022

## 2022-06-04 PROCEDURE — 12000002 HC ACUTE/MED SURGE SEMI-PRIVATE ROOM

## 2022-06-04 PROCEDURE — 25000003 PHARM REV CODE 250: Performed by: INTERNAL MEDICINE

## 2022-06-04 PROCEDURE — 63600175 PHARM REV CODE 636 W HCPCS: Performed by: RADIOLOGY

## 2022-06-04 PROCEDURE — 99233 SBSQ HOSP IP/OBS HIGH 50: CPT | Mod: ,,, | Performed by: INTERNAL MEDICINE

## 2022-06-04 PROCEDURE — 99233 PR SUBSEQUENT HOSPITAL CARE,LEVL III: ICD-10-PCS | Mod: ,,, | Performed by: INTERNAL MEDICINE

## 2022-06-04 PROCEDURE — 63700000 PHARM REV CODE 250 ALT 637 W/O HCPCS: Performed by: INTERNAL MEDICINE

## 2022-06-04 PROCEDURE — 25000003 PHARM REV CODE 250: Performed by: RADIOLOGY

## 2022-06-04 PROCEDURE — 63600175 PHARM REV CODE 636 W HCPCS: Performed by: INTERNAL MEDICINE

## 2022-06-04 PROCEDURE — 25000242 PHARM REV CODE 250 ALT 637 W/ HCPCS: Performed by: INTERNAL MEDICINE

## 2022-06-04 RX ORDER — FENTANYL CITRATE 50 UG/ML
INJECTION, SOLUTION INTRAMUSCULAR; INTRAVENOUS CODE/TRAUMA/SEDATION MEDICATION
Status: COMPLETED | OUTPATIENT
Start: 2022-06-04 | End: 2022-06-04

## 2022-06-04 RX ORDER — POLYETHYLENE GLYCOL 3350 17 G/17G
17 POWDER, FOR SOLUTION ORAL 3 TIMES DAILY
Status: DISCONTINUED | OUTPATIENT
Start: 2022-06-04 | End: 2022-06-08 | Stop reason: HOSPADM

## 2022-06-04 RX ORDER — MIDAZOLAM HYDROCHLORIDE 1 MG/ML
INJECTION INTRAMUSCULAR; INTRAVENOUS CODE/TRAUMA/SEDATION MEDICATION
Status: COMPLETED | OUTPATIENT
Start: 2022-06-04 | End: 2022-06-04

## 2022-06-04 RX ORDER — SODIUM CHLORIDE 9 MG/ML
INJECTION, SOLUTION INTRAVENOUS
Status: COMPLETED | OUTPATIENT
Start: 2022-06-04 | End: 2022-06-04

## 2022-06-04 RX ADMIN — FLUTICASONE PROPIONATE 50 MCG: 50 SPRAY, METERED NASAL at 09:06

## 2022-06-04 RX ADMIN — MELATONIN 6 MG: at 08:06

## 2022-06-04 RX ADMIN — Medication 5000 UNITS: at 10:06

## 2022-06-04 RX ADMIN — FLUCONAZOLE 200 MG: 100 TABLET ORAL at 10:06

## 2022-06-04 RX ADMIN — ACETAMINOPHEN 650 MG: 325 TABLET ORAL at 08:06

## 2022-06-04 RX ADMIN — CARVEDILOL 6.25 MG: 6.25 TABLET, FILM COATED ORAL at 08:06

## 2022-06-04 RX ADMIN — MIDAZOLAM HYDROCHLORIDE 1 MG: 1 INJECTION, SOLUTION INTRAMUSCULAR; INTRAVENOUS at 08:06

## 2022-06-04 RX ADMIN — ATORVASTATIN CALCIUM 40 MG: 40 TABLET, FILM COATED ORAL at 10:06

## 2022-06-04 RX ADMIN — PIPERACILLIN SODIUM,TAZOBACTAM SODIUM 3.38 G: 3; .375 INJECTION, POWDER, FOR SOLUTION INTRAVENOUS at 10:06

## 2022-06-04 RX ADMIN — EZETIMIBE 10 MG: 10 TABLET ORAL at 10:06

## 2022-06-04 RX ADMIN — FENTANYL CITRATE 50 MCG: 50 INJECTION INTRAMUSCULAR; INTRAVENOUS at 08:06

## 2022-06-04 RX ADMIN — PIPERACILLIN SODIUM,TAZOBACTAM SODIUM 3.38 G: 3; .375 INJECTION, POWDER, FOR SOLUTION INTRAVENOUS at 06:06

## 2022-06-04 RX ADMIN — PIPERACILLIN SODIUM,TAZOBACTAM SODIUM 3.38 G: 3; .375 INJECTION, POWDER, FOR SOLUTION INTRAVENOUS at 03:06

## 2022-06-04 RX ADMIN — SODIUM CHLORIDE 500 ML/HR: 9 INJECTION, SOLUTION INTRAVENOUS at 08:06

## 2022-06-04 RX ADMIN — POLYETHYLENE GLYCOL 3350 17 G: 17 POWDER, FOR SOLUTION ORAL at 08:06

## 2022-06-04 NOTE — PROGRESS NOTES
Consult Note  Infectious Disease    Reason for Consult:  Diverticulitis with abscess    HPI: Tayla Ewing is a 77 y.o. female was admitted in early April with acute diverticulitis and microperforation.  She received in hospital care for several days then 10 days of IV ertapenem daily at.  She saw General surgery in follow-up with complete resolution for midline removal.  on 05/28 she requested treatment tract infection care unfortunately she had not yet established.  She presented to the emergency room on 05/31 with complaints urinary frequency and abdominal of 2 weeks duration.  Her evaluation in emergency included a urinalysis which was unremarkable, CT scan of the thick-walled collection of gas in left pelvis intimately associated the superior margin of the bladder suspicious for abscess (3 cm in longest dimension) and findings of acute diverticulitis in sigmoid colon, slightly less extensive than that April.  She was admitted to Medicine [facility] on Zosyn.  She was seen by General surgery yesterday who recommended drainage.  Because she is Brilinta, she will need to be off this for 5 days plus the weekend this procedure Monday June 6th.  She has no fever and white blood cells which were 12,900 on admission 10,300 today.  Blood cultures are negative.    6/3: interim reviewed. picc placed. IR procedure pending Brilinta wearing off. She has no new complaints. Home IV being arranged.   06/04/2022 Dr Lucia afebrile  She feels bloated and constipated. Sp ir drain today. She deinies ever having stools in the urine or vagina.      Antibiotics (From admission, onward)            Start     Stop Route Frequency Ordered    06/01/22 0200  piperacillin-tazobactam 3.375 g in dextrose 5 % 50 mL IVPB (ready to mix system)         -- IV Every 8 hours (non-standard times) 05/31/22 1844        Antifungals (From admission, onward)            Start     Stop Route Frequency Ordered    06/03/22 0900  fluconazole tablet 200 mg          -- Oral Daily 06/02/22 2107        Antivirals (From admission, onward)    None          EXAM & DIAGNOSTICS REVIEWED:   Vitals:     Temp:  [97.3 °F (36.3 °C)-97.5 °F (36.4 °C)]   Temp: 97.3 °F (36.3 °C) (06/04/22 0804)  Pulse: 60 (06/04/22 0850)  Resp: 18 (06/04/22 0850)  BP: (!) 107/52 (06/04/22 0850)  SpO2: 95 % (06/04/22 0850)    Intake/Output Summary (Last 24 hours) at 6/4/2022 1040  Last data filed at 6/4/2022 0856  Gross per 24 hour   Intake 250 ml   Output --   Net 250 ml       General:  In NAD. Alert and attentive, cooperative, comfortable  Eyes:  Anicteric, EOMI  ENT:  No ulcers, exudates, thrush, nares patent, dentition is good. Hard of hearing  Neck:  Supple,   Lungs: Clear, no consolidation, rales, wheezes, rub  Heart:  RRR, no gallop/murmur/rub noted  Abd:  + drain in lower abdomen with bloody liquid; slighltly sore over llq Soft, non toxic  ND, normal BS, no masses or organomegaly appreciated.  :  Voids  no flank tenderness  Musc:  Joints without effusion, swelling, erythema, synovitis, muscle wasting.   Skin:  No rashes.    Neuro: Alert, attentive, speech fluent, face symmetric, moves all extremities, no focal weakness. Ambulatory  Psych:  Calm, cooperative  Lymphatic:      Extrem: No edema, erythema, phlebitis, cellulitis, warm and well perfused  VAD:  picc line     Isolation:  none  Wound:            General Labs reviewed:  Recent Labs   Lab 06/01/22  0509 06/02/22  0338 06/03/22  0544   WBC 8.01 10.31 9.00   HGB 11.0* 10.8* 10.8*   HCT 32.7* 32.8* 32.8*    296 294       Recent Labs   Lab 05/31/22  1523 06/01/22  0508 06/02/22  0338 06/03/22  0544   * 137 132* 135*   K 3.5 3.7 3.6 3.6    104 102 101   CO2 22* 22* 21* 22*   BUN 14 13 18 15   CREATININE 0.7 0.6 1.0 0.9   CALCIUM 9.4 9.4 8.9 9.5   PROT 7.9  --   --   --    BILITOT 0.7  --   --   --    ALKPHOS 77  --   --   --    ALT 22  --   --   --    AST 19  --   --   --      Recent Labs   Lab 06/03/22  0544   CRP 0.13      Micro:  Microbiology Results (last 7 days)     Procedure Component Value Units Date/Time    Culture, Anaerobe [[id number]]     Order Status: No result Specimen: Abscess from Abdomen     Aerobic culture [[id number]]     Order Status: No result Specimen: Abscess from Abdomen     Blood Culture #1 **CANNOT BE ORDERED STAT** [[id number]] Collected: 05/31/22 1725    Order Status: Completed Specimen: Blood from Peripheral, Antecubital, Left Updated: 06/03/22 1832     Blood Culture, Routine No Growth to date      No Growth to date      No Growth to date      No Growth to date    Blood culture #2 **CANNOT BE ORDERED STAT** [[id number]] Collected: 05/31/22 1753    Order Status: Completed Specimen: Blood from Peripheral, Antecubital, Right Updated: 06/03/22 1832     Blood Culture, Routine No Growth to date      No Growth to date      No Growth to date      No Growth to date          Imaging Reviewed:   CXR   CT abdomen and pelvis 5/31  1. Development of thick-walled collection of gas in left pelvis, intimately associated with the superior bladder, measuring 3.7 x 2.9 by 3 cm, suspicious for abscess.  2. Findings characteristic of acute diverticulitis of the proximal sigmoid colon, slightly less extensive than that on 04/05/2022 CT.    Cardiology:    IMPRESSION & PLAN   1.  Diverticulitis  with micro perforation 4/5 persistent, pelvic abscess, 3.7x2.9x3 cm   S/p IR drainage 06/04=less than 1 cc of purulent material.       Recommendations:  Continue Zosyn + Diflucan  Anticipation home IV antibiotics for few weeks, serial CT scans,   consideration sigmoid colectomy in the near future, after colonoscopy- patient aware        Medical Decision Making during this encounter was  [_] Low Complexity  [_] Moderate Complexity  x ] High Complexity

## 2022-06-04 NOTE — PROGRESS NOTES
"[facility]  Adult Nutrition   Progress Note (Initial Assessment)     SUMMARY     Recommendations  Recommendation/Intervention: 1. Patient NPO multiple times since admission. Plans for NPO at midnight. Recommend resuming low fiber diet as medically able.  Goals: 1. Diet to be resumed as medically able. 2. Patient to meet at least 75% of estimated energy and protein needs.  Nutrition Goal Status: new    Dietitian Rounds Brief  Patient assessed 2' LOS. Patient has been NPO multiple times over past 4 days inpatient. Diverticulitis  with micro perforation 4/5 persistent, pelvic abscess. Patient NPO in the morning for CT guided abscess drainage today. Patient on regular diet at time of rounds, PO intake good 100% of breakfast. Patient [place]. Read lips well.     Reason for Assessment  Reason For Assessment: length of stay  Diagnosis: gastrointestinal disease  Relevant Medical History: multiple sclerosis; diverticulitis;; intra-abdominal abscess    Nutrition Risk Screen  Nutrition Risk Screen: no indicators present     MST Score: 0  Have you recently lost weight without trying?: No  Weight loss score: 0  Have you been eating poorly because of a decreased appetite?: No  Appetite score: 0       Nutrition/Diet History  Food Allergies: NKFA  Factors Affecting Nutritional Intake: None identified at this time    Anthropometrics  Temp: 97.5 °F (36.4 °C)  Height Method: Stated  Height: 5' 5" (165.1 cm)  Height (inches): 65 in  Weight Method: Bed Scale  Weight: 67.5 kg (148 lb 13 oz)  Weight (lb): 148.81 lb  Ideal Body Weight (IBW), Female: 125 lb  % Ideal Body Weight, Female (lb): 119.05 %  BMI (Calculated): 24.8  BMI Grade: 18.5-24.9 - normal       Weight History:  Wt Readings from Last 10 Encounters:   05/31/22 67.5 kg (148 lb 13 oz)   04/19/22 32.3 kg (71 lb 4.8 oz)   04/14/22 71.8 kg (158 lb 4.6 oz)   04/05/22 72.6 kg (160 lb 0.9 oz)   01/24/21 75.3 kg (166 lb)   01/20/21 78 kg (172 lb)   09/06/18 76 kg (167 lb 7 " oz)   07/19/17 83.9 kg (185 lb)   01/20/17 80.1 kg (176 lb 8 oz)   06/15/16 81.1 kg (178 lb 11.2 oz)       Lab/Procedures/Meds: Pertinent Labs Reviewed    Clinical Chemistry:  Recent Labs   Lab 05/31/22  1523 06/01/22  0508 06/03/22  0544   *   < > 135*   K 3.5   < > 3.6      < > 101   CO2 22*   < > 22*      < > 105   BUN 14   < > 15   CREATININE 0.7   < > 0.9   CALCIUM 9.4   < > 9.5   PROT 7.9  --   --    ALBUMIN 4.0  --   --    BILITOT 0.7  --   --    ALKPHOS 77  --   --    AST 19  --   --    ALT 22  --   --    ANIONGAP 10   < > 12   ESTGFRAFRICA >60.0   < > >60.0   EGFRNONAA >60.0   < > >60.0   MG  --    < > 1.8    < > = values in this interval not displayed.       CBC:   Recent Labs   Lab 06/03/22  0544   WBC 9.00   RBC 3.67*   HGB 10.8*   HCT 32.8*      MCV 89   MCH 29.4   MCHC 32.9       Inflammatory Labs:  Recent Labs   Lab 06/03/22  0544   CRP 0.13       Medications: Pertinent Medications reviewed    Scheduled Meds:   amLODIPine  5 mg Oral Daily    atorvastatin  40 mg Oral Daily    carvediloL  6.25 mg Oral BID    cholecalciferol (vitamin D3)  5,000 Units Oral Daily    ezetimibe  10 mg Oral Daily    fluconazole  200 mg Oral Daily    fluticasone propionate  1 spray Each Nostril Daily    hydroCHLOROthiazide  12.5 mg Oral Daily    losartan  50 mg Oral Daily    pantoprazole  40 mg Oral Daily    piperacillin-tazobactam (ZOSYN) IVPB  3.375 g Intravenous Q8H       PRN Meds:.acetaminophen, acetaminophen, gabapentin, magnesium oxide, magnesium oxide, melatonin, naloxone, ondansetron, polyethylene glycol, potassium bicarbonate, potassium bicarbonate, potassium bicarbonate, potassium, sodium phosphates, potassium, sodium phosphates, potassium, sodium phosphates, tiZANidine    Estimated/Assessed Needs  Weight Used For Calorie Calculations: 67.5 kg (148 lb 13 oz)  Energy Calorie Requirements (kcal): 1688 - 2025 (25 - 30)  Energy Need Method: Kcal/kg  Protein Requirements: 81 - 101 (1.2  -1.5)  Weight Used For Protein Calculations: 67.5 kg (148 lb 13 oz)     Estimated Fluid Requirement Method: RDA Method  RDA Method (mL): 1688       Nutrition Prescription Ordered  Current Diet Order: Regular    Evaluation of Received Nutrient/Fluid Intake  Energy Calories Required: not meeting needs  Protein Required: not meeting needs  Fluid Required: meeting needs  Tolerance: tolerating     Intake/Output Summary (Last 24 hours) at 6/4/2022 1638  Last data filed at 6/4/2022 0856  Gross per 24 hour   Intake 250 ml   Output --   Net 250 ml      % Intake of Estimated Energy Needs: 25 - 50 % Due to NPO/ Diet/ NPO (eats well when she receives meal per patient   % Meal Intake: 25 - 50 %    Nutrition Risk  Level of Risk/Frequency of Follow-up: high     Monitor and Evaluation  Food and Nutrient Intake: food and beverage intake, energy intake  Food and Nutrient Adminstration: diet order  Physical Activity and Function: nutrition-related ADLs and IADLs, factors affecting access to physical activity  Anthropometric Measurements: weight, weight change, body mass index  Biochemical Data, Medical Tests and Procedures: electrolyte and renal panel, inflammatory profile, gastrointestinal profile, lipid profile, glucose/endocrine profile  Nutrition-Focused Physical Findings: overall appearance     Nutrition Follow-Up  RD Follow-up?: Yes    Trice Sanches RD 06/04/2022 4:39 PM

## 2022-06-04 NOTE — PROGRESS NOTES
"[facility] Medicine Progress Note  Patient Name: Tayla Ewing MRN: [id number]   Patient Class: IP- Inpatient  Length of Stay: 4   Admission Date: 5/31/2022  1:58 PM Attending Physician: Bruce Ham MD   Primary Care Provider: Marcel Mueller MD Face-to-Face encounter date: 06/04/2022   Chief Complaint: Urinary Frequency (With abdominal pain)    Assessment & Plan:   Tayla Ewing is a 77 y.o. female admitted for left lower quadrant pain/intra-abdominal abscess    Active Problems/Assessment & Plan  1. Intra-abdominal abscess  Recent diverticulitis of proximal colon with microperforation-recent IV antibiotics  Zosyn  S/P Percutaneous Drain today with bloody fluid  Infectious disease-.  Continue Zosyn.   Anticipate need for IV antibiotics for several weeks.    Given fair amount of bloody fluid from drain, will hold off for day on restarting anti-platelet therapy.    Appreciate all recommendations.          Core measures:  - Code status: full code   - Consultants:  General surgery  - Diet:  NPO  - DVT PPx:    - lines: PIV       Hospital Course     06/04/2022          Discharge Planning:   No mobility needs. Patient will be discharged in 2-3    Anticipate discharge on Monday once we have cultures and sensitivities to help direct antibiotic therapy    Subjective:    Interval History   Patient reports mild pain at site of drain.  Afebrile.  Denies chest pain, shortness of breath, palpitations, abdominal pain, nausea/vomiting.   No concerns/issues overnight reported by the patient or the nursing staff.  Reviewed the labs and discussed the plan of care.    at bedside..     Review of Systems   All other Review of Systems were found to be negative expect for that mentioned already in HPI.   Objective:   Physical Exam  BP (!) 124/59   Pulse 77   Temp 97.5 °F (36.4 °C)   Resp 18   Ht 5' 5" (1.651 m)   Wt 67.5 kg (148 lb 13 oz)   SpO2 100%   Breastfeeding No   BMI 24.76 kg/m²   Vitals " reviewed.    General:  Alert and oriented x4.  No acute distress  HEENT:  Normocephalic  Cardiovascular:  Regular rate and rhythm, no murmurs rubs or gallops.  No lower extremity edema.  Pulmonary:  Clear to auscultation bilaterally  Abdomen:  Soft, nontender, nondistended.  No guarding.  No rebound.  Negative Olmedo's.  Positive bowel sounds.  Drain with fair amount of bloody fluid.  Extremity:  Moves all extremities equally.  Dermatology:  No rashes appreciated on exposed skin  Psychiatric:  Normal affect    Following labs were Reviewed   No results for input(s): WBC, HGB, HCT, PLT, GLUCOSE, CALCIUM, ALBUMIN, PROT, NA, K, CO2, CL, BUN, CREATININE, ALKPHOS, ALT, AST, BILITOT in the last 24 hours.  No results found for: POCTGLUCOSE     BMP:   Recent Labs   Lab 06/03/22  0544      *   K 3.6      CO2 22*   BUN 15   CREATININE 0.9   CALCIUM 9.5   MG 1.8   , CBC   Recent Labs   Lab 06/03/22  0544   WBC 9.00   HGB 10.8*   HCT 32.8*       and All labs within the past 24 hours have been reviewed  Microbiology Results (last 7 days)     Procedure Component Value Units Date/Time    Culture, Anaerobe [[id number]]     Order Status: No result Specimen: Abscess from Abdomen     Aerobic culture [[id number]]     Order Status: No result Specimen: Abscess from Abdomen     Blood Culture #1 **CANNOT BE ORDERED STAT** [[id number]] Collected: 05/31/22 1725    Order Status: Completed Specimen: Blood from Peripheral, Antecubital, Left Updated: 06/03/22 1832     Blood Culture, Routine No Growth to date      No Growth to date      No Growth to date      No Growth to date    Blood culture #2 **CANNOT BE ORDERED STAT** [[id number]] Collected: 05/31/22 1753    Order Status: Completed Specimen: Blood from Peripheral, Antecubital, Right Updated: 06/03/22 1832     Blood Culture, Routine No Growth to date      No Growth to date      No Growth to date      No Growth to date        CT Guided Abscess Drainage With Catheter    Final Result      Successful CT-guided drainage of pelvic abscess.         Electronically signed by: Dk Castrejon MD   Date:    06/04/2022   Time:    09:27      X-Ray Chest 1 View   Final Result      CT Abdomen Pelvis  Without Contrast   Final Result      1. Development of thick-walled collection of gas in left pelvis, intimately associated with the superior bladder, measuring 3.7 x 2.9 by 3 cm, suspicious for abscess.   2. Findings characteristic of acute diverticulitis of the proximal sigmoid colon, slightly less extensive than that on 04/05/2022 CT.         Electronically signed by: Nawaf Lala MD   Date:    05/31/2022   Time:    16:43          Inpatient medications  Scheduled Meds:   amLODIPine  5 mg Oral Daily    atorvastatin  40 mg Oral Daily    carvediloL  6.25 mg Oral BID    cholecalciferol (vitamin D3)  5,000 Units Oral Daily    ezetimibe  10 mg Oral Daily    fluconazole  200 mg Oral Daily    fluticasone propionate  1 spray Each Nostril Daily    hydroCHLOROthiazide  12.5 mg Oral Daily    losartan  50 mg Oral Daily    pantoprazole  40 mg Oral Daily    piperacillin-tazobactam (ZOSYN) IVPB  3.375 g Intravenous Q8H     Continuous Infusions:  PRN Meds:.acetaminophen, acetaminophen, gabapentin, magnesium oxide, magnesium oxide, melatonin, naloxone, ondansetron, polyethylene glycol, potassium bicarbonate, potassium bicarbonate, potassium bicarbonate, potassium, sodium phosphates, potassium, sodium phosphates, potassium, sodium phosphates, tiZANidine    Above encounter included review of the medical records, interviewing and examining the patient face-to-face, discussion with family and other health care providers, ordering and interpreting lab/test results and formulating a plan of care.     Medical Decision Making:    [] Low Complexity  [x] Moderate Complexity  [] High Complexity    Bruce Ham  [facility] Hospitalist  06/04/2022

## 2022-06-04 NOTE — INTERVAL H&P NOTE
The patient has been examined and the H&P has been reviewed prior to CT guided abscess drainage.        Active Hospital Problems    Diagnosis  POA    *Intra-abdominal abscess [K65.1]  Yes    History of diverticulitis of colon [Z87.19]  Not Applicable    Diverticulitis [K57.92]  Unknown    MS (multiple sclerosis) [G35]  Yes      Resolved Hospital Problems   No resolved problems to display.

## 2022-06-05 LAB
BACTERIA BLD CULT: NORMAL
BACTERIA BLD CULT: NORMAL

## 2022-06-05 PROCEDURE — 99231 PR SUBSEQUENT HOSPITAL CARE,LEVL I: ICD-10-PCS | Mod: ,,, | Performed by: INTERNAL MEDICINE

## 2022-06-05 PROCEDURE — 12000002 HC ACUTE/MED SURGE SEMI-PRIVATE ROOM

## 2022-06-05 PROCEDURE — 63600175 PHARM REV CODE 636 W HCPCS: Performed by: INTERNAL MEDICINE

## 2022-06-05 PROCEDURE — 25000003 PHARM REV CODE 250: Performed by: INTERNAL MEDICINE

## 2022-06-05 PROCEDURE — 63700000 PHARM REV CODE 250 ALT 637 W/O HCPCS: Performed by: INTERNAL MEDICINE

## 2022-06-05 PROCEDURE — 99231 SBSQ HOSP IP/OBS SF/LOW 25: CPT | Mod: ,,, | Performed by: INTERNAL MEDICINE

## 2022-06-05 RX ADMIN — ATORVASTATIN CALCIUM 40 MG: 40 TABLET, FILM COATED ORAL at 10:06

## 2022-06-05 RX ADMIN — ACETAMINOPHEN 650 MG: 325 TABLET ORAL at 08:06

## 2022-06-05 RX ADMIN — CARVEDILOL 6.25 MG: 6.25 TABLET, FILM COATED ORAL at 10:06

## 2022-06-05 RX ADMIN — HYDROCHLOROTHIAZIDE 12.5 MG: 12.5 TABLET ORAL at 10:06

## 2022-06-05 RX ADMIN — MELATONIN 6 MG: at 08:06

## 2022-06-05 RX ADMIN — CARVEDILOL 6.25 MG: 6.25 TABLET, FILM COATED ORAL at 08:06

## 2022-06-05 RX ADMIN — FLUCONAZOLE 200 MG: 100 TABLET ORAL at 09:06

## 2022-06-05 RX ADMIN — EZETIMIBE 10 MG: 10 TABLET ORAL at 09:06

## 2022-06-05 RX ADMIN — PIPERACILLIN SODIUM,TAZOBACTAM SODIUM 3.38 G: 3; .375 INJECTION, POWDER, FOR SOLUTION INTRAVENOUS at 10:06

## 2022-06-05 RX ADMIN — PIPERACILLIN SODIUM,TAZOBACTAM SODIUM 3.38 G: 3; .375 INJECTION, POWDER, FOR SOLUTION INTRAVENOUS at 04:06

## 2022-06-05 RX ADMIN — POLYETHYLENE GLYCOL 3350 17 G: 17 POWDER, FOR SOLUTION ORAL at 08:06

## 2022-06-05 RX ADMIN — AMLODIPINE BESYLATE 5 MG: 5 TABLET ORAL at 10:06

## 2022-06-05 RX ADMIN — LOSARTAN POTASSIUM 50 MG: 50 TABLET, FILM COATED ORAL at 09:06

## 2022-06-05 RX ADMIN — POLYETHYLENE GLYCOL 3350 17 G: 17 POWDER, FOR SOLUTION ORAL at 10:06

## 2022-06-05 RX ADMIN — PANTOPRAZOLE SODIUM 40 MG: 40 TABLET, DELAYED RELEASE ORAL at 05:06

## 2022-06-05 RX ADMIN — Medication 5000 UNITS: at 10:06

## 2022-06-05 RX ADMIN — PIPERACILLIN SODIUM,TAZOBACTAM SODIUM 3.38 G: 3; .375 INJECTION, POWDER, FOR SOLUTION INTRAVENOUS at 05:06

## 2022-06-05 NOTE — PROGRESS NOTES
Progress Note  Infectious Disease    Reason for Consult:  Diverticulitis with abscess    HPI: Tayla Ewing is a 77 y.o. female was admitted in early April with acute diverticulitis and microperforation.  She received in hospital care for several days then 10 days of IV ertapenem daily at.  She saw General surgery in follow-up with complete resolution for midline removal.  on 05/28 she requested treatment tract infection care unfortunately she had not yet established.  She presented to the emergency room on 05/31 with complaints urinary frequency and abdominal of 2 weeks duration.  Her evaluation in emergency included a urinalysis which was unremarkable, CT scan of the thick-walled collection of gas in left pelvis intimately associated the superior margin of the bladder suspicious for abscess (3 cm in longest dimension) and findings of acute diverticulitis in sigmoid colon, slightly less extensive than that April.  She was admitted to Medicine placed on Zosyn.  She was seen by General surgery yesterday who recommended drainage.  Because she is Brilinta, she will need to be off this for 5 days plus the weekend this procedure Monday June 6th.  She has no fever and white blood cells which were 12,900 on admission 10,300 today.  Blood cultures are negative.    6/3: interim reviewed. picc placed. IR procedure pending Brilinta wearing off. She has no new complaints. Home IV being arranged.   06/04/2022 Dr Lucia afebrile  She feels bloated and constipated. Sp ir drain today. She deinies ever having stools in the urine or vagina.  06/05/2022 DrGjino  Afebrile, pleasant,  Hard of hearing, Daughter, Jagruti helps me explain plan to patient  + abdominal soreness on LLQ yesterday, and RLQ today  + spider like and light to the left eye  resolved. She has h/o Right retinal detachment. I asked daughter to make an appointment tomorrow am with her ophthalmologist      Antibiotics (From admission, onward)            Start     Stop  Route Frequency Ordered    06/01/22 0200  piperacillin-tazobactam 3.375 g in dextrose 5 % 50 mL IVPB (ready to mix system)         -- IV Every 8 hours (non-standard times) 05/31/22 1844        Antifungals (From admission, onward)            Start     Stop Route Frequency Ordered    06/03/22 0900  fluconazole tablet 200 mg         -- Oral Daily 06/02/22 2107        Antivirals (From admission, onward)    None          EXAM & DIAGNOSTICS REVIEWED:   Vitals:     Temp:  [97.5 °F (36.4 °C)-98.2 °F (36.8 °C)]   Temp: 97.8 °F (36.6 °C) (06/05/22 0750)  Pulse: 67 (06/05/22 0750)  Resp: 16 (06/05/22 0750)  BP: 125/62 (06/05/22 1010)  SpO2: 99 % (06/05/22 0750)    Intake/Output Summary (Last 24 hours) at 6/5/2022 1047  Last data filed at 6/5/2022 0600  Gross per 24 hour   Intake 120 ml   Output 30 ml   Net 90 ml       General:  In NAD. Alert and attentive, cooperative, comfortable  Eyes:  Anicteric, EOMI  ENT:  No ulcers, exudates, thrush, nares patent, dentition is good. Hard of hearing  Neck:  Supple,   Lungs: Clear, no consolidation, rales, wheezes, rub  Heart:  RRR, no gallop/murmur/rub noted  Abd:  + drain in lower abdomen with bloody liquid; slighltly sore over RLQ Soft, non toxic  ND, normal BS, no masses or organomegaly appreciated.  :  Voids  no flank tenderness  Musc:  Joints without effusion, swelling, erythema, synovitis, muscle wasting.   Skin:  No rashes.    Neuro: Alert, attentive, speech fluent, face symmetric, moves all extremities, no focal weakness. Ambulatory  Psych:  Calm, cooperative  Lymphatic:      Extrem: No edema, erythema, phlebitis, cellulitis, warm and well perfused  VAD:  picc line     Isolation:  none  Wound:            General Labs reviewed:  Recent Labs   Lab 06/01/22  0509 06/02/22  0338 06/03/22  0544   WBC 8.01 10.31 9.00   HGB 11.0* 10.8* 10.8*   HCT 32.7* 32.8* 32.8*    296 294       Recent Labs   Lab 05/31/22  1523 06/01/22  0508 06/02/22  0338 06/03/22  0544   * 137 132*  135*   K 3.5 3.7 3.6 3.6    104 102 101   CO2 22* 22* 21* 22*   BUN 14 13 18 15   CREATININE 0.7 0.6 1.0 0.9   CALCIUM 9.4 9.4 8.9 9.5   PROT 7.9  --   --   --    BILITOT 0.7  --   --   --    ALKPHOS 77  --   --   --    ALT 22  --   --   --    AST 19  --   --   --      Recent Labs   Lab 06/03/22  0544   CRP 0.13     Micro:  Microbiology Results (last 7 days)     Procedure Component Value Units Date/Time    Blood culture #2 **CANNOT BE ORDERED STAT** [[id number]] Collected: 05/31/22 1753    Order Status: Completed Specimen: Blood from Peripheral, Antecubital, Right Updated: 06/04/22 1832     Blood Culture, Routine No Growth to date      No Growth to date      No Growth to date      No Growth to date      No Growth to date    Blood Culture #1 **CANNOT BE ORDERED STAT** [[id number]] Collected: 05/31/22 1725    Order Status: Completed Specimen: Blood from Peripheral, Antecubital, Left Updated: 06/04/22 1832     Blood Culture, Routine No Growth to date      No Growth to date      No Growth to date      No Growth to date      No Growth to date    Culture, Anaerobe [[id number]]     Order Status: No result Specimen: Abscess from Abdomen     Aerobic culture [[id number]]     Order Status: No result Specimen: Abscess from Abdomen           Imaging Reviewed:   CXR   CT abdomen and pelvis 5/31  1. Development of thick-walled collection of gas in left pelvis, intimately associated with the superior bladder, measuring 3.7 x 2.9 by 3 cm, suspicious for abscess.  2. Findings characteristic of acute diverticulitis of the proximal sigmoid colon, slightly less extensive than that on 04/05/2022 CT.    Cardiology:    IMPRESSION & PLAN   1.  Diverticulitis  with micro perforation 4/5 persistent, pelvic abscess, 3.7x2.9x3 cm   S/p IR drainage 06/04=less than 1 cc of purulent material.       Recommendations:  Continue Zosyn iv  + Diflucan po   ( Abx x 4 weeks , but it may be shortened  duration, depending how well she does.)  Same  AdMoment for labs and dressing change  Serial CT scans-- next CT abd and pelvis  in 7-10 days please  Weekly labs x4  : ESR, CRP, CBC diff    Consideration sigmoid colectomy in the near future, after colonoscopy- patient aware. She will see dr Ruiz      Also follow with ophthalmology re left eye complains        Medical Decision Making during this encounter was  [_] Low Complexity  [_] Moderate Complexity  x ] High Complexity

## 2022-06-05 NOTE — PROGRESS NOTES
[facility] Medicine Progress Note  Patient Name: Tayla Ewing MRN: [id number]   Patient Class: IP- Inpatient  Length of Stay: 5   Admission Date: 5/31/2022  1:58 PM Attending Physician: Bruce Ham MD   Primary Care Provider: Marcel Mueller MD Face-to-Face encounter date: 06/05/2022   Chief Complaint: Urinary Frequency (With abdominal pain)    Assessment & Plan:   Tayla Ewing is a 77 y.o. female admitted for left lower quadrant pain/intra-abdominal abscess    Active Problems/Assessment & Plan  1. Intra-abdominal abscess  Recent diverticulitis of proximal colon with microperforation-recent IV antibiotics  Zosyn  S/P Percutaneous Drain POD 2  Specimen not resulted.  I called the microbiology lab and they report that they did not receive the specimen.  Quantros event submitted.    Infectious disease-.  Continue Zosyn.   Anticipate need for IV antibiotics for several weeks.    Still with bloody drainage.  Hold antiplatelet therapy    Appreciate all recommendations.          Core measures:  - Code status: full code   - Consultants:  General surgery  - Diet:  Regular diet  - DVT PPx:    - lines: PIV       Hospital Course     06/05/2022          Discharge Planning:   No mobility needs. Patient will be discharged in 2-3 days    Discharge may be held up due to specimen not making it to microbiology lab.  Will discuss with ID.    Subjective:    Interval History   Patient reports mild pain at site of drain.  Afebrile.  Denies chest pain, shortness of breath, palpitations, abdominal pain, nausea/vomiting.   No concerns/issues overnight reported by the patient or the nursing staff.  Reviewed the labs and discussed the plan of care.    at bedside..     Review of Systems   All other Review of Systems were found to be negative expect for that mentioned already in HPI.   Objective:   Physical Exam  /62 (BP Location: Right arm, Patient Position: Sitting)   Pulse 72   Temp 97.5 °F (36.4  "°C) (Oral)   Resp 15   Ht 5' 5" (1.651 m)   Wt 67.5 kg (148 lb 13 oz)   SpO2 100%   Breastfeeding No   BMI 24.76 kg/m²   Vitals reviewed.    General:  Alert and oriented x4.  No acute distress  HEENT:  Normocephalic  Cardiovascular:  Regular rate and rhythm, no murmurs rubs or gallops.  No lower extremity edema.  Pulmonary:  Clear to auscultation bilaterally  Abdomen:  Soft, nontender, nondistended.  No guarding.  No rebound.  Negative Olmedo's.  Positive bowel sounds.  Drain with bloody fluid.  Extremity:  Moves all extremities equally.  Dermatology:  No rashes appreciated on exposed skin  Psychiatric:  Normal affect    Following labs were Reviewed   No results for input(s): WBC, HGB, HCT, PLT, GLUCOSE, CALCIUM, ALBUMIN, PROT, NA, K, CO2, CL, BUN, CREATININE, ALKPHOS, ALT, AST, BILITOT in the last 24 hours.  No results found for: POCTGLUCOSE     BMP:   No results for input(s): GLU, NA, K, CL, CO2, BUN, CREATININE, CALCIUM, MG in the last 48 hours., CBC   No results for input(s): WBC, HGB, HCT, PLT in the last 48 hours. and All labs within the past 24 hours have been reviewed  Microbiology Results (last 7 days)     Procedure Component Value Units Date/Time    Blood culture #2 **CANNOT BE ORDERED STAT** [[id number]] Collected: 05/31/22 1753    Order Status: Completed Specimen: Blood from Peripheral, Antecubital, Right Updated: 06/04/22 1832     Blood Culture, Routine No Growth to date      No Growth to date      No Growth to date      No Growth to date      No Growth to date    Blood Culture #1 **CANNOT BE ORDERED STAT** [[id number]] Collected: 05/31/22 1725    Order Status: Completed Specimen: Blood from Peripheral, Antecubital, Left Updated: 06/04/22 1832     Blood Culture, Routine No Growth to date      No Growth to date      No Growth to date      No Growth to date      No Growth to date    Culture, Anaerobe [[id number]]     Order Status: No result Specimen: Abscess from Abdomen     Aerobic culture [[id number]] "     Order Status: No result Specimen: Abscess from Abdomen         CT Guided Abscess Drainage With Catheter   Final Result      Successful CT-guided drainage of pelvic abscess.         Electronically signed by: Dk Castrejon MD   Date:    06/04/2022   Time:    09:27      X-Ray Chest 1 View   Final Result      CT Abdomen Pelvis  Without Contrast   Final Result      1. Development of thick-walled collection of gas in left pelvis, intimately associated with the superior bladder, measuring 3.7 x 2.9 by 3 cm, suspicious for abscess.   2. Findings characteristic of acute diverticulitis of the proximal sigmoid colon, slightly less extensive than that on 04/05/2022 CT.         Electronically signed by: Nawaf Lala MD   Date:    05/31/2022   Time:    16:43          Inpatient medications  Scheduled Meds:   amLODIPine  5 mg Oral Daily    atorvastatin  40 mg Oral Daily    carvediloL  6.25 mg Oral BID    cholecalciferol (vitamin D3)  5,000 Units Oral Daily    ezetimibe  10 mg Oral Daily    fluconazole  200 mg Oral Daily    fluticasone propionate  1 spray Each Nostril Daily    hydroCHLOROthiazide  12.5 mg Oral Daily    losartan  50 mg Oral Daily    pantoprazole  40 mg Oral Daily    piperacillin-tazobactam (ZOSYN) IVPB  3.375 g Intravenous Q8H    polyethylene glycol  17 g Oral TID     Continuous Infusions:  PRN Meds:.acetaminophen, acetaminophen, gabapentin, magnesium oxide, magnesium oxide, melatonin, naloxone, ondansetron, potassium bicarbonate, potassium bicarbonate, potassium bicarbonate, potassium, sodium phosphates, potassium, sodium phosphates, potassium, sodium phosphates, tiZANidine    Above encounter included review of the medical records, interviewing and examining the patient face-to-face, discussion with family and other health care providers, ordering and interpreting lab/test results and formulating a plan of care.     Medical Decision Making:    [] Low Complexity  [x] Moderate Complexity  [] High  Complexity    Bruce Ham  [facility] Hospitalist  06/05/2022

## 2022-06-05 NOTE — PROGRESS NOTES
[facility]  General Surgery  Progress Note    Subjective:     Interval History: CT guided drain placed today. No new complaints. Afebrile. Drain with 15 cc of output in drain.     Post-Op Info:  * No surgery found *          Medications:  Continuous Infusions:  Scheduled Meds:   amLODIPine  5 mg Oral Daily    atorvastatin  40 mg Oral Daily    carvediloL  6.25 mg Oral BID    cholecalciferol (vitamin D3)  5,000 Units Oral Daily    ezetimibe  10 mg Oral Daily    fluconazole  200 mg Oral Daily    fluticasone propionate  1 spray Each Nostril Daily    hydroCHLOROthiazide  12.5 mg Oral Daily    losartan  50 mg Oral Daily    pantoprazole  40 mg Oral Daily    piperacillin-tazobactam (ZOSYN) IVPB  3.375 g Intravenous Q8H    polyethylene glycol  17 g Oral TID     PRN Meds:acetaminophen, acetaminophen, gabapentin, magnesium oxide, magnesium oxide, melatonin, naloxone, ondansetron, potassium bicarbonate, potassium bicarbonate, potassium bicarbonate, potassium, sodium phosphates, potassium, sodium phosphates, potassium, sodium phosphates, tiZANidine     Objective:     Vital Signs (Most Recent):  Temp: 98.1 °F (36.7 °C) (06/04/22 2016)  Pulse: 81 (06/04/22 2016)  Resp: 18 (06/04/22 2016)  BP: 138/63 (06/04/22 2016)  SpO2: 98 % (06/04/22 2016) Vital Signs (24h Range):  Temp:  [97.3 °F (36.3 °C)-98.1 °F (36.7 °C)] 98.1 °F (36.7 °C)  Pulse:  [56-81] 81  Resp:  [16-19] 18  SpO2:  [95 %-100 %] 98 %  BP: (105-138)/(47-70) 138/63       Intake/Output Summary (Last 24 hours) at 6/4/2022 2226  Last data filed at 6/4/2022 0856  Gross per 24 hour   Intake 250 ml   Output --   Net 250 ml       Physical Exam  Vitals reviewed.   Constitutional:       General: She is not in acute distress.  Pulmonary:      Effort: Pulmonary effort is normal.   Abdominal:      General: There is no distension.      Palpations: Abdomen is soft.      Tenderness: There is no abdominal tenderness. There is no guarding.      Comments: Drain  cloudy-sanguinous    abdomen is non tender    Neurological:      Mental Status: She is alert.         Significant Labs:  CBC:   Recent Labs   Lab 06/03/22  0544   WBC 9.00   RBC 3.67*   HGB 10.8*   HCT 32.8*      MCV 89   MCH 29.4   MCHC 32.9     CMP:   Recent Labs   Lab 06/03/22  0544      CALCIUM 9.5   *   K 3.6   CO2 22*      BUN 15   CREATININE 0.9       Significant Diagnostics:  I have reviewed all pertinent imaging results/findings within the past 24 hours.    Assessment/Plan:     Active Diagnoses:    Diagnosis Date Noted POA    PRINCIPAL PROBLEM:  Intra-abdominal abscess [K65.1] 05/31/2022 Yes    History of diverticulitis of colon [Z87.19] 05/31/2022 Not Applicable    Diverticulitis [K57.92] 04/05/2022 Unknown    MS (multiple sclerosis) [G35] 12/12/2014 Yes      Problems Resolved During this Admission:     -IR drain in place. Doing well. Plan for DC with antibiotics once sensitivities return.   -follow up with surgery 7-10 days.   Antonio Mott III, MD  General Surgery  [facility]

## 2022-06-06 PROCEDURE — 12000002 HC ACUTE/MED SURGE SEMI-PRIVATE ROOM

## 2022-06-06 PROCEDURE — 87186 SC STD MICRODIL/AGAR DIL: CPT | Performed by: INTERNAL MEDICINE

## 2022-06-06 PROCEDURE — 25000003 PHARM REV CODE 250: Performed by: INTERNAL MEDICINE

## 2022-06-06 PROCEDURE — 63600175 PHARM REV CODE 636 W HCPCS: Performed by: INTERNAL MEDICINE

## 2022-06-06 PROCEDURE — 87077 CULTURE AEROBIC IDENTIFY: CPT | Performed by: INTERNAL MEDICINE

## 2022-06-06 PROCEDURE — 63700000 PHARM REV CODE 250 ALT 637 W/O HCPCS: Performed by: INTERNAL MEDICINE

## 2022-06-06 PROCEDURE — 99231 PR SUBSEQUENT HOSPITAL CARE,LEVL I: ICD-10-PCS | Mod: ,,, | Performed by: INTERNAL MEDICINE

## 2022-06-06 PROCEDURE — 99231 SBSQ HOSP IP/OBS SF/LOW 25: CPT | Mod: ,,, | Performed by: INTERNAL MEDICINE

## 2022-06-06 PROCEDURE — 87075 CULTR BACTERIA EXCEPT BLOOD: CPT | Performed by: FAMILY MEDICINE

## 2022-06-06 PROCEDURE — 87070 CULTURE OTHR SPECIMN AEROBIC: CPT | Performed by: INTERNAL MEDICINE

## 2022-06-06 PROCEDURE — 87185 SC STD ENZYME DETCJ PER NZM: CPT | Performed by: INTERNAL MEDICINE

## 2022-06-06 RX ADMIN — HYDROCHLOROTHIAZIDE 12.5 MG: 12.5 TABLET ORAL at 08:06

## 2022-06-06 RX ADMIN — PIPERACILLIN SODIUM,TAZOBACTAM SODIUM 3.38 G: 3; .375 INJECTION, POWDER, FOR SOLUTION INTRAVENOUS at 05:06

## 2022-06-06 RX ADMIN — LOSARTAN POTASSIUM 50 MG: 50 TABLET, FILM COATED ORAL at 08:06

## 2022-06-06 RX ADMIN — FLUCONAZOLE 200 MG: 100 TABLET ORAL at 08:06

## 2022-06-06 RX ADMIN — MELATONIN 6 MG: at 09:06

## 2022-06-06 RX ADMIN — ATORVASTATIN CALCIUM 40 MG: 40 TABLET, FILM COATED ORAL at 08:06

## 2022-06-06 RX ADMIN — PANTOPRAZOLE SODIUM 40 MG: 40 TABLET, DELAYED RELEASE ORAL at 05:06

## 2022-06-06 RX ADMIN — Medication 5000 UNITS: at 08:06

## 2022-06-06 RX ADMIN — CARVEDILOL 6.25 MG: 6.25 TABLET, FILM COATED ORAL at 08:06

## 2022-06-06 RX ADMIN — PIPERACILLIN SODIUM,TAZOBACTAM SODIUM 3.38 G: 3; .375 INJECTION, POWDER, FOR SOLUTION INTRAVENOUS at 10:06

## 2022-06-06 RX ADMIN — CARVEDILOL 6.25 MG: 6.25 TABLET, FILM COATED ORAL at 09:06

## 2022-06-06 RX ADMIN — PIPERACILLIN SODIUM,TAZOBACTAM SODIUM 3.38 G: 3; .375 INJECTION, POWDER, FOR SOLUTION INTRAVENOUS at 02:06

## 2022-06-06 RX ADMIN — POLYETHYLENE GLYCOL 3350 17 G: 17 POWDER, FOR SOLUTION ORAL at 09:06

## 2022-06-06 RX ADMIN — AMLODIPINE BESYLATE 5 MG: 5 TABLET ORAL at 08:06

## 2022-06-06 RX ADMIN — EZETIMIBE 10 MG: 10 TABLET ORAL at 08:06

## 2022-06-06 NOTE — PLAN OF CARE
Problem: Adult Inpatient Plan of Care  Goal: Plan of Care Review  Outcome: Ongoing, Progressing  Goal: Patient-Specific Goal (Individualized)  Outcome: Ongoing, Progressing  Goal: Absence of Hospital-Acquired Illness or Injury  Outcome: Ongoing, Progressing  Goal: Optimal Comfort and Wellbeing  Outcome: Ongoing, Progressing  Goal: Readiness for Transition of Care  Outcome: Ongoing, Progressing     Problem: Fall Injury Risk  Goal: Absence of Fall and Fall-Related Injury  Outcome: Ongoing, Progressing     Problem: Infection  Goal: Absence of Infection Signs and Symptoms  Outcome: Ongoing, Progressing

## 2022-06-06 NOTE — PROGRESS NOTES
Progress Note  Infectious Disease    Reason for Consult:  Diverticulitis with abscess    HPI: Tayla Ewing is a 77 y.o. female was admitted in early April with acute diverticulitis and microperforation.  She received in hospital care for several days then 10 days of IV ertapenem daily at.  She saw General surgery in follow-up with complete resolution for midline removal.  on 05/28 she requested treatment tract infection care unfortunately she had not yet established.  She presented to the emergency room on 05/31 with complaints urinary frequency and abdominal of 2 weeks duration.  Her evaluation in emergency included a urinalysis which was unremarkable, CT scan of the thick-walled collection of gas in left pelvis intimately associated the superior margin of the bladder suspicious for abscess (3 cm in longest dimension) and findings of acute diverticulitis in sigmoid colon, slightly less extensive than that April.  She was admitted to Medicine placed on Zosyn.  She was seen by General surgery yesterday who recommended drainage.  Because she is Brilinta, she will need to be off this for 5 days plus the weekend this procedure Monday June 6th.  She has no fever and white blood cells which were 12,900 on admission 10,300 today.  Blood cultures are negative.    6/3: interim reviewed. picc placed. IR procedure pending Brilinta wearing off. She has no new complaints. Home IV being arranged.   06/04/2022 Dr Lucia afebrile  She feels bloated and constipated. Sp ir drain today. She deinies ever having stools in the urine or vagina.  06/05/2022 DrGjino  Afebrile, pleasant,  Hard of hearing, Daughter, Jagrtui helps me explain plan to patient  + abdominal soreness on LLQ yesterday, and RLQ today  + spider like and light to the left eye  resolved. She has h/o Right retinal detachment. I asked daughter to make an appointment tomorrow am with her ophthalmologist    06/06/2022.  Dr. Lucia.  Patient is afebrile.  The material coming  out of the intra-abdominal drain has been foul-smelling.  There were no cultures sent because there was not enough coming out initially, from the drain.(will try to collect now, but it will not be a perfect sample)  T-max 99.9°.  Patient would like to come to [facility] for blood work  The visual complaints (she had yesterday like a spider like vision and some light) have completely resolved.  She plans to schedule an appointment with ophthalmologist this week.  She already has an appointment with Dr. Gibbons gastroenterology    Antibiotics (From admission, onward)            Start     Stop Route Frequency Ordered    06/01/22 0200  piperacillin-tazobactam 3.375 g in dextrose 5 % 50 mL IVPB (ready to mix system)         -- IV Every 8 hours (non-standard times) 05/31/22 1844        Antifungals (From admission, onward)            Start     Stop Route Frequency Ordered    06/03/22 0900  fluconazole tablet 200 mg         -- Oral Daily 06/02/22 2107        Antivirals (From admission, onward)    None          EXAM & DIAGNOSTICS REVIEWED:   Vitals:     Temp:  [98.1 °F (36.7 °C)-99.9 °F (37.7 °C)]   Temp: 98.2 °F (36.8 °C) (06/06/22 1720)  Pulse: 75 (06/06/22 1720)  Resp: 18 (06/06/22 1720)  BP: (!) 106/57 (06/06/22 1720)  SpO2: 98 % (06/06/22 1720)    Intake/Output Summary (Last 24 hours) at 6/6/2022 1759  Last data filed at 6/6/2022 1357  Gross per 24 hour   Intake 290 ml   Output 85 ml   Net 205 ml       General:  In NAD. Alert and attentive, cooperative, comfortable  Eyes:  Anicteric, EOMI  ENT:  Hard of hearing  Neck:  Supple,   Lungs: Clear,  Heart:  RRR,   Abd:  + drain in lower abdomen, now it looks foul-smelling pus; slighltly sore over RLQ and left lower quadrant Soft, non toxic  ND, normal BS, no masses or organomegaly appreciated.  :  Voids  no flank tenderness  Musc:  Joints without effusion, swelling, erythema, synovitis, muscle wasting.   Skin:  No rashes.    Neuro: Alert, attentive, speech fluent, face symmetric,  moves all extremities, no focal weakness. Ambulatory  Psych:  Calm, cooperative  Lymphatic:      Extrem: No edema, erythema,  VAD:  picc line     Isolation:  none  Wound:            General Labs reviewed:  Recent Labs   Lab 06/01/22  0509 06/02/22  0338 06/03/22  0544   WBC 8.01 10.31 9.00   HGB 11.0* 10.8* 10.8*   HCT 32.7* 32.8* 32.8*    296 294       Recent Labs   Lab 05/31/22  1523 06/01/22  0508 06/02/22  0338 06/03/22  0544   * 137 132* 135*   K 3.5 3.7 3.6 3.6    104 102 101   CO2 22* 22* 21* 22*   BUN 14 13 18 15   CREATININE 0.7 0.6 1.0 0.9   CALCIUM 9.4 9.4 8.9 9.5   PROT 7.9  --   --   --    BILITOT 0.7  --   --   --    ALKPHOS 77  --   --   --    ALT 22  --   --   --    AST 19  --   --   --      Recent Labs   Lab 06/03/22  0544   CRP 0.13     Micro:  Microbiology Results (last 7 days)     Procedure Component Value Units Date/Time    Culture, Anaerobe [[id number]] Collected: 06/06/22 1452    Order Status: Sent Specimen: Abscess from Abdomen Updated: 06/06/22 1508    Aerobic culture [[id number]] Collected: 06/06/22 1450    Order Status: Sent Specimen: Abscess Updated: 06/06/22 1503    Aerobic culture [[id number]]     Order Status: Canceled Specimen: Abdominal from Abscess     Culture, Anaerobe [[id number]]     Order Status: Canceled Specimen: Abdominal from Abdomen     Blood culture #2 **CANNOT BE ORDERED STAT** [[id number]] Collected: 05/31/22 1753    Order Status: Completed Specimen: Blood from Peripheral, Antecubital, Right Updated: 06/05/22 1832     Blood Culture, Routine No growth after 5 days.    Blood Culture #1 **CANNOT BE ORDERED STAT** [[id number]] Collected: 05/31/22 1725    Order Status: Completed Specimen: Blood from Peripheral, Antecubital, Left Updated: 06/05/22 1832     Blood Culture, Routine No growth after 5 days.    Culture, Anaerobe [[id number]]     Order Status: Canceled Specimen: Abscess from Abdomen     Aerobic culture [[id number]]     Order Status: Canceled Specimen:  Abscess from Abdomen           Imaging Reviewed:   CXR   CT abdomen and pelvis 5/31  1. Development of thick-walled collection of gas in left pelvis, intimately associated with the superior bladder, measuring 3.7 x 2.9 by 3 cm, suspicious for abscess.  2. Findings characteristic of acute diverticulitis of the proximal sigmoid colon, slightly less extensive than that on 04/05/2022 CT.    Cardiology:    IMPRESSION & PLAN   1.  Diverticulitis  with micro perforation 4/5 persistent, pelvic abscess, 3.7x2.9x3 cm   S/p IR drainage 06/04=less than 1 cc of purulent material.       Recommendations:  Continue Zosyn iv  + Diflucan po   ( Abx x 4 weeks , but it may be shortened  duration, depending how well she does.)  Same infusion company    She asked for Home health for labs and dressing change, but then she changed mind and would like to [facility] weekly    Serial CT scans-- next CT abd and pelvis   For follow up (around 06/14= 2 week tran from the most recent CT)    Weekly labs x4  : ESR, CRP, CBC diff    Consideration sigmoid colectomy in the near future, after colonoscopy- patient aware. She will see dr Ruiz      Also follow with ophthalmology re left eye complains

## 2022-06-06 NOTE — PROGRESS NOTES
[facility] Medicine Progress Note  Patient Name: Tayla Ewing MRN: [id number]   Patient Class: IP- Inpatient  Length of Stay: 6   Admission Date: 5/31/2022  1:58 PM Attending Physician: Bruce Ham MD   Primary Care Provider: Marcel Mueller MD Face-to-Face encounter date: 06/06/2022   Chief Complaint: Urinary Frequency (With abdominal pain)    Assessment & Plan:   Tayla Ewing is a 77 y.o. female admitted for left lower quadrant pain/intra-abdominal abscess    Active Problems/Assessment & Plan  1. Intra-abdominal abscess  Recent diverticulitis of proximal colon with microperforation-recent IV antibiotics  Zosyn per ID x4 weeks.  Patient has PICC  S/P Percutaneous Drain POD 3  Specimen not resulted.  I called the microbiology lab and they report that they did not receive the specimen.  Quantros event submitted.    Today with a brown discharge with foul odor per nursing.  General Surgery reconsulted to evaluate patient.  Concern for fistula..    Hold antiplatelet therapy pending surgery recommendations.    Appreciate all recommendations.          Core measures:  - Code status: full code   - Consultants:  General surgery, and ID- Diet:  Regular diet  - DVT PPx:    - lines: PIV       Hospital Course     06/06/2022          Discharge Planning:   No mobility needs. Patient will be discharged in 0-1 days    Discharge pending General surgery recommendations.    Subjective:    Interval History   Patient reports continued mild pain at site of drain.  Afebrile.  Denies chest pain, shortness of breath, palpitations, abdominal pain, nausea/vomiting.   No concerns/issues overnight reported by the patient or the nursing staff.  Reviewed the labs and discussed the plan of care.    at bedside..     Review of Systems   All other Review of Systems were found to be negative expect for that mentioned already in HPI.   Objective:   Physical Exam  BP (!) 110/59   Pulse 71   Temp 98.2 °F (36.8 °C)   " Resp 18   Ht 5' 5" (1.651 m)   Wt 67.5 kg (148 lb 13 oz)   SpO2 98%   Breastfeeding No   BMI 24.76 kg/m²   Vitals reviewed.    General:  Alert and oriented x4.  No acute distress  HEENT:  Normocephalic  Cardiovascular:  Regular rate and rhythm, no murmurs rubs or gallops.  No lower extremity edema.  Pulmonary:  Clear to auscultation bilaterally  Abdomen:  Soft, nontender, nondistended.  No guarding.  No rebound.  Negative Olmedo's.  Positive bowel sounds.  Drain with brownish thin fluid  Extremity:  Moves all extremities equally.  Dermatology:  No rashes appreciated on exposed skin  Psychiatric:  Normal affect    Following labs were Reviewed   No results for input(s): WBC, HGB, HCT, PLT, GLUCOSE, CALCIUM, ALBUMIN, PROT, NA, K, CO2, CL, BUN, CREATININE, ALKPHOS, ALT, AST, BILITOT in the last 24 hours.  No results found for: POCTGLUCOSE     BMP:   No results for input(s): GLU, NA, K, CL, CO2, BUN, CREATININE, CALCIUM, MG in the last 48 hours., CBC   No results for input(s): WBC, HGB, HCT, PLT in the last 48 hours. and All labs within the past 24 hours have been reviewed  Microbiology Results (last 7 days)     Procedure Component Value Units Date/Time    Culture, Anaerobe [[id number]] Collected: 06/06/22 1452    Order Status: Sent Specimen: Abscess from Abdomen Updated: 06/06/22 1508    Aerobic culture [[id number]] Collected: 06/06/22 1450    Order Status: Sent Specimen: Abscess Updated: 06/06/22 1503    Aerobic culture [[id number]]     Order Status: Canceled Specimen: Abdominal from Abscess     Culture, Anaerobe [[id number]]     Order Status: Canceled Specimen: Abdominal from Abdomen     Blood culture #2 **CANNOT BE ORDERED STAT** [[id number]] Collected: 05/31/22 1753    Order Status: Completed Specimen: Blood from Peripheral, Antecubital, Right Updated: 06/05/22 1832     Blood Culture, Routine No growth after 5 days.    Blood Culture #1 **CANNOT BE ORDERED STAT** [[id number]] Collected: 05/31/22 1725    Order " Status: Completed Specimen: Blood from Peripheral, Antecubital, Left Updated: 06/05/22 1832     Blood Culture, Routine No growth after 5 days.    Culture, Anaerobe [[id number]]     Order Status: Canceled Specimen: Abscess from Abdomen     Aerobic culture [[id number]]     Order Status: Canceled Specimen: Abscess from Abdomen         CT Guided Abscess Drainage With Catheter   Final Result      Successful CT-guided drainage of pelvic abscess.         Electronically signed by: kD Castrejon MD   Date:    06/04/2022   Time:    09:27      X-Ray Chest 1 View   Final Result      CT Abdomen Pelvis  Without Contrast   Final Result      1. Development of thick-walled collection of gas in left pelvis, intimately associated with the superior bladder, measuring 3.7 x 2.9 by 3 cm, suspicious for abscess.   2. Findings characteristic of acute diverticulitis of the proximal sigmoid colon, slightly less extensive than that on 04/05/2022 CT.         Electronically signed by: Nawaf Lala MD   Date:    05/31/2022   Time:    16:43          Inpatient medications  Scheduled Meds:   amLODIPine  5 mg Oral Daily    atorvastatin  40 mg Oral Daily    carvediloL  6.25 mg Oral BID    cholecalciferol (vitamin D3)  5,000 Units Oral Daily    ezetimibe  10 mg Oral Daily    fluconazole  200 mg Oral Daily    fluticasone propionate  1 spray Each Nostril Daily    hydroCHLOROthiazide  12.5 mg Oral Daily    losartan  50 mg Oral Daily    pantoprazole  40 mg Oral Daily    piperacillin-tazobactam (ZOSYN) IVPB  3.375 g Intravenous Q8H    polyethylene glycol  17 g Oral TID     Continuous Infusions:  PRN Meds:.acetaminophen, gabapentin, magnesium oxide, magnesium oxide, melatonin, naloxone, ondansetron, potassium bicarbonate, potassium bicarbonate, potassium bicarbonate, potassium, sodium phosphates, potassium, sodium phosphates, potassium, sodium phosphates, tiZANidine    Above encounter included review of the medical records, interviewing and  examining the patient face-to-face, discussion with family and other health care providers, ordering and interpreting lab/test results and formulating a plan of care.     Medical Decision Making:    [] Low Complexity  [x] Moderate Complexity  [] High Complexity    Bruce Ham  [facility] Hospitalist  06/06/2022

## 2022-06-06 NOTE — PLAN OF CARE
06/06/22 1822   Medicare Message   Important Message from Medicare regarding Discharge Appeal Rights Given to patient/caregiver;Explained to patient/caregiver;Signed/date by patient/caregiver   Date IMM was signed 06/06/22   Time IMM was signed [id number]

## 2022-06-06 NOTE — PROGRESS NOTES
RADHAMES drain emptied and drainage was brown with strong bowel odor. Contacted Dr. Mott to inform his of pt.'s status and he stated he will be coming to see her.

## 2022-06-07 LAB
ANION GAP SERPL CALC-SCNC: 11 MMOL/L (ref 8–16)
BASOPHILS # BLD AUTO: 0.05 K/UL (ref 0–0.2)
BASOPHILS NFR BLD: 0.5 % (ref 0–1.9)
BUN SERPL-MCNC: 13 MG/DL (ref 8–23)
CALCIUM SERPL-MCNC: 9.7 MG/DL (ref 8.7–10.5)
CHLORIDE SERPL-SCNC: 102 MMOL/L (ref 95–110)
CO2 SERPL-SCNC: 23 MMOL/L (ref 23–29)
CREAT SERPL-MCNC: 0.7 MG/DL (ref 0.5–1.4)
DIFFERENTIAL METHOD: ABNORMAL
EOSINOPHIL # BLD AUTO: 0.1 K/UL (ref 0–0.5)
EOSINOPHIL NFR BLD: 1 % (ref 0–8)
ERYTHROCYTE [DISTWIDTH] IN BLOOD BY AUTOMATED COUNT: 13.2 % (ref 11.5–14.5)
EST. GFR  (AFRICAN AMERICAN): >60 ML/MIN/1.73 M^2
EST. GFR  (NON AFRICAN AMERICAN): >60 ML/MIN/1.73 M^2
GLUCOSE SERPL-MCNC: 114 MG/DL (ref 70–110)
HCT VFR BLD AUTO: 31.7 % (ref 37–48.5)
HGB BLD-MCNC: 10.5 G/DL (ref 12–16)
IMM GRANULOCYTES # BLD AUTO: 0.06 K/UL (ref 0–0.04)
IMM GRANULOCYTES NFR BLD AUTO: 0.6 % (ref 0–0.5)
LYMPHOCYTES # BLD AUTO: 1.8 K/UL (ref 1–4.8)
LYMPHOCYTES NFR BLD: 16.5 % (ref 18–48)
MCH RBC QN AUTO: 29.3 PG (ref 27–31)
MCHC RBC AUTO-ENTMCNC: 33.1 G/DL (ref 32–36)
MCV RBC AUTO: 89 FL (ref 82–98)
MONOCYTES # BLD AUTO: 0.9 K/UL (ref 0.3–1)
MONOCYTES NFR BLD: 8.3 % (ref 4–15)
NEUTROPHILS # BLD AUTO: 7.8 K/UL (ref 1.8–7.7)
NEUTROPHILS NFR BLD: 73.1 % (ref 38–73)
NRBC BLD-RTO: 0 /100 WBC
PLATELET # BLD AUTO: 266 K/UL (ref 150–450)
PMV BLD AUTO: 9.6 FL (ref 9.2–12.9)
POTASSIUM SERPL-SCNC: 3.5 MMOL/L (ref 3.5–5.1)
RBC # BLD AUTO: 3.58 M/UL (ref 4–5.4)
SODIUM SERPL-SCNC: 136 MMOL/L (ref 136–145)
WBC # BLD AUTO: 10.65 K/UL (ref 3.9–12.7)

## 2022-06-07 PROCEDURE — 85025 COMPLETE CBC W/AUTO DIFF WBC: CPT | Performed by: FAMILY MEDICINE

## 2022-06-07 PROCEDURE — 25000003 PHARM REV CODE 250: Performed by: INTERNAL MEDICINE

## 2022-06-07 PROCEDURE — 63700000 PHARM REV CODE 250 ALT 637 W/O HCPCS: Performed by: INTERNAL MEDICINE

## 2022-06-07 PROCEDURE — 12000002 HC ACUTE/MED SURGE SEMI-PRIVATE ROOM

## 2022-06-07 PROCEDURE — 99231 PR SUBSEQUENT HOSPITAL CARE,LEVL I: ICD-10-PCS | Mod: ,,, | Performed by: INTERNAL MEDICINE

## 2022-06-07 PROCEDURE — 36415 COLL VENOUS BLD VENIPUNCTURE: CPT | Performed by: FAMILY MEDICINE

## 2022-06-07 PROCEDURE — 80048 BASIC METABOLIC PNL TOTAL CA: CPT | Performed by: FAMILY MEDICINE

## 2022-06-07 PROCEDURE — 63600175 PHARM REV CODE 636 W HCPCS: Performed by: INTERNAL MEDICINE

## 2022-06-07 PROCEDURE — 99231 SBSQ HOSP IP/OBS SF/LOW 25: CPT | Mod: ,,, | Performed by: INTERNAL MEDICINE

## 2022-06-07 RX ADMIN — PANTOPRAZOLE SODIUM 40 MG: 40 TABLET, DELAYED RELEASE ORAL at 06:06

## 2022-06-07 RX ADMIN — EZETIMIBE 10 MG: 10 TABLET ORAL at 09:06

## 2022-06-07 RX ADMIN — POLYETHYLENE GLYCOL 3350 17 G: 17 POWDER, FOR SOLUTION ORAL at 09:06

## 2022-06-07 RX ADMIN — PIPERACILLIN SODIUM,TAZOBACTAM SODIUM 3.38 G: 3; .375 INJECTION, POWDER, FOR SOLUTION INTRAVENOUS at 05:06

## 2022-06-07 RX ADMIN — PIPERACILLIN SODIUM,TAZOBACTAM SODIUM 3.38 G: 3; .375 INJECTION, POWDER, FOR SOLUTION INTRAVENOUS at 03:06

## 2022-06-07 RX ADMIN — FLUCONAZOLE 200 MG: 100 TABLET ORAL at 09:06

## 2022-06-07 RX ADMIN — POLYETHYLENE GLYCOL 3350 17 G: 17 POWDER, FOR SOLUTION ORAL at 02:06

## 2022-06-07 RX ADMIN — CARVEDILOL 6.25 MG: 6.25 TABLET, FILM COATED ORAL at 09:06

## 2022-06-07 RX ADMIN — HYDROCHLOROTHIAZIDE 12.5 MG: 12.5 TABLET ORAL at 09:06

## 2022-06-07 RX ADMIN — ATORVASTATIN CALCIUM 40 MG: 40 TABLET, FILM COATED ORAL at 09:06

## 2022-06-07 RX ADMIN — PIPERACILLIN SODIUM,TAZOBACTAM SODIUM 3.38 G: 3; .375 INJECTION, POWDER, FOR SOLUTION INTRAVENOUS at 09:06

## 2022-06-07 RX ADMIN — ACETAMINOPHEN 650 MG: 325 TABLET ORAL at 11:06

## 2022-06-07 RX ADMIN — Medication 5000 UNITS: at 09:06

## 2022-06-07 NOTE — PLAN OF CARE
Roberto spoke with Margaret at [facility] at [phone]. Margaret informed cm with the representative, Chris Shultz [phone]. Roberto will follow-up with Chris.  12:39 roberto left Chris a voice message. Pending a call back.  Roberto will continue following.

## 2022-06-07 NOTE — PROGRESS NOTES
Formerly [facility] Medicine Progress Note  Patient Name: Tayla Ewing MRN: [id number]   Patient Class: IP- Inpatient  Length of Stay: 7   Admission Date: 5/31/2022  1:58 PM Attending Physician: Bruce Ham MD   Primary Care Provider: Marcel Mueller MD Face-to-Face encounter date: 06/07/2022   Chief Complaint: Urinary Frequency (With abdominal pain)    Assessment & Plan:   Tayla wEing is a 77 y.o. female admitted for left lower quadrant pain/intra-abdominal abscess    Active Problems/Assessment & Plan  1. Intra-abdominal abscess  Recent diverticulitis of proximal colon with microperforation  Zosyn per ID x4 weeks.  Patient has PICC  S/P Percutaneous Drain POD 4  Now with feculent material from RADHAMES  Surgery following.  At time of my evaluation, surgery had not seen the patient today.    Surgery to decide whether patient needs surgery now verses treatment with IV antibiotics to cool down inflammation.    Hold antiplatelet therapy pending surgery recommendations.    Appreciate all recommendations.          Core measures:  - Code status: full code   - Consultants:  General surgery, and ID-   -Diet:  Regular diet  - DVT PPx:    - lines: PIV       Hospital Course     06/07/2022          Discharge Planning:   No mobility needs. Patient will be discharged in 0-1 days    Discharge pending General surgery recommendations.    Subjective:    Interval History   Patient reports continued mild pain at site of drain.  Slightly better than yesterday.  Cream be brown feculent material from RADHAMES.  Afebrile.  Denies chest pain, shortness of breath, palpitations, abdominal pain, nausea/vomiting.   No concerns/issues overnight reported by the patient or the nursing staff.  Reviewed the labs and discussed the plan of care.    at bedside..     Review of Systems   All other Review of Systems were found to be negative expect for that mentioned already in HPI.   Objective:   Physical Exam  /63   Pulse 74   " Temp 97.9 °F (36.6 °C) (Oral)   Resp 18   Ht 5' 5" (1.651 m)   Wt 67.5 kg (148 lb 13 oz)   SpO2 99%   Breastfeeding No   BMI 24.76 kg/m²   Vitals reviewed.    General:  Alert and oriented x4.  No acute distress  HEENT:  Normocephalic  Cardiovascular:  Regular rate and rhythm, no murmurs rubs or gallops.  No lower extremity edema.  Pulmonary:  Clear to auscultation bilaterally  Abdomen:  Soft, nontender, nondistended.  No guarding.  No rebound.  Negative Olmedo's.  Positive bowel sounds.  Drain with creamy brown feculent material.  Malodorous.  Extremity:  Moves all extremities equally.  Dermatology:  No rashes appreciated on exposed skin  Psychiatric:  Normal affect    Following labs were Reviewed   Recent Labs   Lab 06/07/22  0759   WBC 10.65   HGB 10.5*   HCT 31.7*      CALCIUM 9.7      K 3.5   CO2 23      BUN 13   CREATININE 0.7     No results found for: POCTGLUCOSE     BMP:   Recent Labs   Lab 06/07/22  0759   *      K 3.5      CO2 23   BUN 13   CREATININE 0.7   CALCIUM 9.7   , CBC   Recent Labs   Lab 06/07/22  0759   WBC 10.65   HGB 10.5*   HCT 31.7*       and All labs within the past 24 hours have been reviewed  Microbiology Results (last 7 days)     Procedure Component Value Units Date/Time    Aerobic culture [[id number]]  (Abnormal) Collected: 06/06/22 1450    Order Status: Completed Specimen: Abscess Updated: 06/07/22 1126     Aerobic Bacterial Culture HAEMOPHILUS PARAINFLUENZAE BIOTYPE I  Moderate  Identification pending  Beta Lactamase negative      Culture, Anaerobe [[id number]] Collected: 06/06/22 1452    Order Status: Sent Specimen: Abscess from Abdomen Updated: 06/06/22 1508    Aerobic culture [[id number]]     Order Status: Canceled Specimen: Abdominal from Abscess     Culture, Anaerobe [[id number]]     Order Status: Canceled Specimen: Abdominal from Abdomen     Blood culture #2 **CANNOT BE ORDERED STAT** [[id number]] Collected: 05/31/22 3943    Order " Status: Completed Specimen: Blood from Peripheral, Antecubital, Right Updated: 06/05/22 1832     Blood Culture, Routine No growth after 5 days.    Blood Culture #1 **CANNOT BE ORDERED STAT** [[id number]] Collected: 05/31/22 1725    Order Status: Completed Specimen: Blood from Peripheral, Antecubital, Left Updated: 06/05/22 1832     Blood Culture, Routine No growth after 5 days.    Culture, Anaerobe [[id number]]     Order Status: Canceled Specimen: Abscess from Abdomen     Aerobic culture [[id number]]     Order Status: Canceled Specimen: Abscess from Abdomen         CT Guided Abscess Drainage With Catheter   Final Result      Successful CT-guided drainage of pelvic abscess.         Electronically signed by: Dk Castrejon MD   Date:    06/04/2022   Time:    09:27      X-Ray Chest 1 View   Final Result      CT Abdomen Pelvis  Without Contrast   Final Result      1. Development of thick-walled collection of gas in left pelvis, intimately associated with the superior bladder, measuring 3.7 x 2.9 by 3 cm, suspicious for abscess.   2. Findings characteristic of acute diverticulitis of the proximal sigmoid colon, slightly less extensive than that on 04/05/2022 CT.         Electronically signed by: Nawaf Lala MD   Date:    05/31/2022   Time:    16:43          Inpatient medications  Scheduled Meds:   amLODIPine  5 mg Oral Daily    atorvastatin  40 mg Oral Daily    carvediloL  6.25 mg Oral BID    cholecalciferol (vitamin D3)  5,000 Units Oral Daily    ezetimibe  10 mg Oral Daily    fluconazole  200 mg Oral Daily    fluticasone propionate  1 spray Each Nostril Daily    hydroCHLOROthiazide  12.5 mg Oral Daily    losartan  50 mg Oral Daily    pantoprazole  40 mg Oral Daily    piperacillin-tazobactam (ZOSYN) IVPB  3.375 g Intravenous Q8H    polyethylene glycol  17 g Oral TID     Continuous Infusions:  PRN Meds:.acetaminophen, gabapentin, magnesium oxide, magnesium oxide, melatonin, naloxone, ondansetron, potassium  bicarbonate, potassium bicarbonate, potassium bicarbonate, potassium, sodium phosphates, potassium, sodium phosphates, potassium, sodium phosphates, tiZANidine    Above encounter included review of the medical records, interviewing and examining the patient face-to-face, discussion with family and other health care providers, ordering and interpreting lab/test results and formulating a plan of care.     Medical Decision Making:    [] Low Complexity  [x] Moderate Complexity  [] High Complexity    Bruce Ham  [facility] Hospitalist  06/07/2022

## 2022-06-07 NOTE — PROGRESS NOTES
Progress Note  Infectious Disease    Reason for Consult:  Diverticulitis with abscess    HPI: Tayla Ewing is a 77 y.o. female was admitted in early April with acute diverticulitis and microperforation.  She received in hospital care for several days then 10 days of IV ertapenem daily at.  She saw General surgery in follow-up with complete resolution for midline removal.  on 05/28 she requested treatment tract infection care unfortunately she had not yet established.  She presented to the emergency room on 05/31 with complaints urinary frequency and abdominal of 2 weeks duration.  Her evaluation in emergency included a urinalysis which was unremarkable, CT scan of the thick-walled collection of gas in left pelvis intimately associated the superior margin of the bladder suspicious for abscess (3 cm in longest dimension) and findings of acute diverticulitis in sigmoid colon, slightly less extensive than that April.  She was admitted to Medicine placed on Zosyn.  She was seen by General surgery yesterday who recommended drainage.  Because she is Brilinta, she will need to be off this for 5 days plus the weekend this procedure Monday June 6th.  She has no fever and white blood cells which were 12,900 on admission 10,300 today.  Blood cultures are negative.    6/3: interim reviewed. picc placed. IR procedure pending Brilinta wearing off. She has no new complaints. Home IV being arranged.   06/04/2022 Dr Lucia afebrile  She feels bloated and constipated. Sp ir drain today. She deinies ever having stools in the urine or vagina.  06/05/2022 DrGjino  Afebrile, pleasant,  Hard of hearing, Daughter, Jagruti helps me explain plan to patient  + abdominal soreness on LLQ yesterday, and RLQ today  + spider like and light to the left eye  resolved. She has h/o Right retinal detachment. I asked daughter to make an appointment tomorrow am with her ophthalmologist    06/06/2022.  Dr. Lucia.  Patient is afebrile.  The material coming  out of the intra-abdominal drain has been foul-smelling.  There were no cultures sent because there was not enough coming out initially, from the drain.(will try to collect now, but it will not be a perfect sample)  T-max 99.9°.  Patient would like to come [facility] blood work  The visual complaints (she had yesterday like a spider like vision and some light) have completely resolved.  She plans to schedule an appointment with ophthalmologist this week.  She already has an appointment with Dr. Gibbons gastroenterology  06/07/2022.  The lower abdominal drain has foul-smelling thick liquid, grayish like, 50 cc yesterday and 10 cc today.  Patient would like to go home but she is concerned how will she handle the lower abdominal drain, especially when it starts leaking around.  Discussed with general surgery    Antibiotics (From admission, onward)            Start     Stop Route Frequency Ordered    06/01/22 0200  piperacillin-tazobactam 3.375 g in dextrose 5 % 50 mL IVPB (ready to mix system)         -- IV Every 8 hours (non-standard times) 05/31/22 1844        Antifungals (From admission, onward)            Start     Stop Route Frequency Ordered    06/03/22 0900  fluconazole tablet 200 mg         -- Oral Daily 06/02/22 2107        Antivirals (From admission, onward)    None          EXAM & DIAGNOSTICS REVIEWED:   Vitals:     Temp:  [97.7 °F (36.5 °C)-98.3 °F (36.8 °C)]   Temp: 97.9 °F (36.6 °C) (06/07/22 1115)  Pulse: 74 (06/07/22 1115)  Resp: 18 (06/07/22 1115)  BP: 123/63 (06/07/22 1115)  SpO2: 99 % (06/07/22 1115)    Intake/Output Summary (Last 24 hours) at 6/7/2022 1405  Last data filed at 6/7/2022 1343  Gross per 24 hour   Intake 840 ml   Output 900 ml   Net -60 ml     Output by Drain (mL) 06/05/22 0701 - 06/05/22 1900 06/05/22 1901 - 06/06/22 0700 06/06/22 0701 - 06/06/22 1900 06/06/22 1901 - 06/07/22 0700 06/07/22 0701 - 06/07/22 1411        Closed/Suction Drain 06/04/22 0843 Anterior Hip Bulb 8 Fr. 25 50 10        General:  In NAD. Alert and attentive, cooperative, comfortable  Eyes:  Anicteric, EOMI  ENT:  Hard of hearing daughter and  help.  Patient can understand very well when she can read lips  Neck:  Supple,   Lungs: Clear,  Heart:  RRR,   Abd:  + drain in lower abdomen, now it looks foul-smelling pus; slighltly sore over RLQ and left lower quadrant Soft, non toxic  ND, normal BS, no masses or organomegaly appreciated.  :  Voids  no flank tenderness  Musc:  Joints without effusion, swelling, erythema, synovitis, muscle wasting.   Skin:  No rashes.    Neuro: Alert, attentive, speech fluent, face symmetric, moves all extremities, no focal weakness. Ambulatory  Psych:  Calm, cooperative  Lymphatic:      Extrem: No edema, erythema,  VAD:  picc line     Isolation:  none  Wound:            General Labs reviewed:  Recent Labs   Lab 06/02/22  0338 06/03/22  0544 06/07/22  0759   WBC 10.31 9.00 10.65   HGB 10.8* 10.8* 10.5*   HCT 32.8* 32.8* 31.7*    294 266       Recent Labs   Lab 05/31/22  1523 06/01/22  0508 06/02/22  0338 06/03/22  0544 06/07/22  0759   *   < > 132* 135* 136   K 3.5   < > 3.6 3.6 3.5      < > 102 101 102   CO2 22*   < > 21* 22* 23   BUN 14   < > 18 15 13   CREATININE 0.7   < > 1.0 0.9 0.7   CALCIUM 9.4   < > 8.9 9.5 9.7   PROT 7.9  --   --   --   --    BILITOT 0.7  --   --   --   --    ALKPHOS 77  --   --   --   --    ALT 22  --   --   --   --    AST 19  --   --   --   --     < > = values in this interval not displayed.     Recent Labs   Lab 06/03/22  0544   CRP 0.13     Micro:  Microbiology Results (last 7 days)     Procedure Component Value Units Date/Time    Aerobic culture [[id number]]  (Abnormal) Collected: 06/06/22 1450    Order Status: Completed Specimen: Abscess Updated: 06/07/22 1126     Aerobic Bacterial Culture HAEMOPHILUS PARAINFLUENZAE BIOTYPE I  Moderate  Identification pending  Beta Lactamase negative      Culture, Anaerobe [[id number]] Collected: 06/06/22 6271     Order Status: Sent Specimen: Abscess from Abdomen Updated: 06/06/22 1508    Aerobic culture [[id number]]     Order Status: Canceled Specimen: Abdominal from Abscess     Culture, Anaerobe [[id number]]     Order Status: Canceled Specimen: Abdominal from Abdomen     Blood culture #2 **CANNOT BE ORDERED STAT** [[id number]] Collected: 05/31/22 1753    Order Status: Completed Specimen: Blood from Peripheral, Antecubital, Right Updated: 06/05/22 1832     Blood Culture, Routine No growth after 5 days.    Blood Culture #1 **CANNOT BE ORDERED STAT** [[id number]] Collected: 05/31/22 1725    Order Status: Completed Specimen: Blood from Peripheral, Antecubital, Left Updated: 06/05/22 1832     Blood Culture, Routine No growth after 5 days.    Culture, Anaerobe [[id number]]     Order Status: Canceled Specimen: Abscess from Abdomen     Aerobic culture [[id number]]     Order Status: Canceled Specimen: Abscess from Abdomen           Imaging Reviewed:   CXR   CT abdomen and pelvis 5/31  1. Development of thick-walled collection of gas in left pelvis, intimately associated with the superior bladder, measuring 3.7 x 2.9 by 3 cm, suspicious for abscess.  2. Findings characteristic of acute diverticulitis of the proximal sigmoid colon, slightly less extensive than that on 04/05/2022 CT.    Cardiology:    IMPRESSION & PLAN   1.  Diverticulitis  with micro perforation 4/5 persistent, pelvic abscess, 3.7x2.9x3 cm   S/p IR drainage 06/04=less than 1 cc of purulent material.    Cultures from drain, not a perfect culture, is showing Haemophilus parainfluenza.  Of course this infection is polimicrobial.    Recommendations:  Continue Zosyn iv  + Diflucan po  Abx x 4 weeks , but it may be shortened  duration, depending how well she does.  Infusion company: [facility]   ASU weekly for dressing change and blood draw    Serial CT abd and pelvis; next CT around 06/14= 2 week tran from the most recent CT  Weekly labs x4  : ESR, CRP, CBC diff  For follow up   In ID clinic with me or Dr Craig     Consideration sigmoid colectomy in the near future, after colonoscopy- patient aware. She will discuss dr Ruiz      Also follow with ophthalmology re left eye complains, which happened x1 day, on and resolved

## 2022-06-07 NOTE — PROGRESS NOTES
[facility]  General Surgery  Progress Note    Subjective:     Interval History: Reporting more lower abdominal pain - around the drain. No fever or chills. RADHAMES more purulent, possibly feculent.     Post-Op Info:  * No surgery found *          Medications:  Continuous Infusions:  Scheduled Meds:   amLODIPine  5 mg Oral Daily    atorvastatin  40 mg Oral Daily    carvediloL  6.25 mg Oral BID    cholecalciferol (vitamin D3)  5,000 Units Oral Daily    ezetimibe  10 mg Oral Daily    fluconazole  200 mg Oral Daily    fluticasone propionate  1 spray Each Nostril Daily    hydroCHLOROthiazide  12.5 mg Oral Daily    losartan  50 mg Oral Daily    pantoprazole  40 mg Oral Daily    piperacillin-tazobactam (ZOSYN) IVPB  3.375 g Intravenous Q8H    polyethylene glycol  17 g Oral TID     PRN Meds:acetaminophen, gabapentin, magnesium oxide, magnesium oxide, melatonin, naloxone, ondansetron, potassium bicarbonate, potassium bicarbonate, potassium bicarbonate, potassium, sodium phosphates, potassium, sodium phosphates, potassium, sodium phosphates, tiZANidine     Objective:     Vital Signs (Most Recent):  Temp: 98.1 °F (36.7 °C) (06/06/22 1941)  Pulse: 72 (06/06/22 1941)  Resp: 18 (06/06/22 1941)  BP: 114/69 (06/06/22 1941)  SpO2: 97 % (06/06/22 1941) Vital Signs (24h Range):  Temp:  [98.1 °F (36.7 °C)-99.5 °F (37.5 °C)] 98.1 °F (36.7 °C)  Pulse:  [71-94] 72  Resp:  [18-19] 18  SpO2:  [97 %-99 %] 97 %  BP: (106-135)/(57-71) 114/69       Intake/Output Summary (Last 24 hours) at 6/6/2022 2058  Last data filed at 6/6/2022 1357  Gross per 24 hour   Intake 50 ml   Output 50 ml   Net 0 ml       Physical Exam  Vitals reviewed.   Constitutional:       General: She is not in acute distress.  Pulmonary:      Effort: Pulmonary effort is normal.   Abdominal:      General: There is no distension.      Palpations: Abdomen is soft.      Tenderness: There is abdominal tenderness. There is no guarding.          Comments: Drain  is purulent. Foul odor    abdomen is tender LLQ. No guarding    Neurological:      Mental Status: She is alert.         Significant Labs:  CBC: No results for input(s): WBC, RBC, HGB, HCT, PLT, MCV, MCH, MCHC in the last 48 hours.  CMP: No results for input(s): GLU, CALCIUM, ALBUMIN, PROT, NA, K, CO2, CL, BUN, CREATININE, ALKPHOS, ALT, AST, BILITOT in the last 48 hours.    Significant Diagnostics:  I have reviewed all pertinent imaging results/findings within the past 24 hours.    Assessment/Plan:     Active Diagnoses:    Diagnosis Date Noted POA    PRINCIPAL PROBLEM:  Intra-abdominal abscess [K65.1] 05/31/2022 Yes    History of diverticulitis of colon [Z87.19] 05/31/2022 Not Applicable    Diverticulitis [K57.92] 04/05/2022 Unknown    MS (multiple sclerosis) [G35] 12/12/2014 Yes      Problems Resolved During this Admission:     -RADHAMES purulent today. 40 cc output. Appearance somewhat expected after IR drainage. Fistula possible. Will continue to follow.     Antonio Mott III, MD  General Surgery  [facility]

## 2022-06-07 NOTE — PROGRESS NOTES
formerly [facility]  Adult Nutrition   Progress Note (Follow-Up)    SUMMARY      Recommendations:   1. Change current diet to Low Fiber, Lowfat, as tolerated.  2. Menu  to obtain choice for meals     Goals:   Goals:   1. Diet to be resumed as medically able. Met  2. Patient to meet at least 75% of estimated energy and protein needs. Progressing    Dietitian Rounds Brief  Patient reports lower abdominal pain, possible fistula. Good intake of meals per RN. D/C pending sx.    Diet order: Cardiac    % Intake of Estimated Energy Needs: 75 - 100 %  % Meal Intake: 75 - 100 %    Estimated/Assessed Needs  Weight Used For Calorie Calculations: 67.5 kg (148 lb 13 oz)  Energy Calorie Requirements (kcal): 1688 - 2025 (25 - 30)  Energy Need Method: Kcal/kg  Protein Requirements: 81 - 101 (1.2 -1.5)  Weight Used For Protein Calculations: 67.5 kg (148 lb 13 oz)     Estimated Fluid Requirement Method: RDA Method  RDA Method (mL): 1688       Weight History:  Wt Readings from Last 5 Encounters:   05/31/22 67.5 kg (148 lb 13 oz)   04/19/22 32.3 kg (71 lb 4.8 oz)   04/14/22 71.8 kg (158 lb 4.6 oz)   04/05/22 72.6 kg (160 lb 0.9 oz)   01/24/21 75.3 kg (166 lb)        Reason for Assessment  Reason For Assessment: length of stay  Diagnosis: gastrointestinal disease  Relevant Medical History: multiple sclerosis; diverticulitis;; intra-abdominal abscess    Medications:Pertinent Medications Reviewed  Scheduled Meds:   amLODIPine  5 mg Oral Daily    atorvastatin  40 mg Oral Daily    carvediloL  6.25 mg Oral BID    cholecalciferol (vitamin D3)  5,000 Units Oral Daily    ezetimibe  10 mg Oral Daily    fluconazole  200 mg Oral Daily    fluticasone propionate  1 spray Each Nostril Daily    hydroCHLOROthiazide  12.5 mg Oral Daily    losartan  50 mg Oral Daily    pantoprazole  40 mg Oral Daily    piperacillin-tazobactam (ZOSYN) IVPB  3.375 g Intravenous Q8H    polyethylene glycol  17 g Oral TID     Continuous Infusions:  PRN  Meds:.acetaminophen, gabapentin, magnesium oxide, magnesium oxide, melatonin, naloxone, ondansetron, potassium bicarbonate, potassium bicarbonate, potassium bicarbonate, potassium, sodium phosphates, potassium, sodium phosphates, potassium, sodium phosphates, tiZANidine    Labs: Pertinent Labs Reviewed  Clinical Chemistry:  Recent Labs   Lab 05/31/22  1523 06/03/22  0544 06/07/22  0759   * 135* 136   K 3.5 3.6 3.5    101 102   CO2 22* 22* 23    105 114*   BUN 14 15 13   CREATININE 0.7 0.9 0.7   CALCIUM 9.4 9.5 9.7   PROT 7.9  --   --    ALBUMIN 4.0  --   --    BILITOT 0.7  --   --    ALKPHOS 77  --   --    AST 19  --   --    ALT 22  --   --    ANIONGAP 10 12 11   ESTGFRAFRICA >60.0 >60.0 >60.0   EGFRNONAA >60.0 >60.0 >60.0   MG  --  1.8  --     < > = values in this interval not displayed.     CBC:   Recent Labs   Lab 06/07/22  0759   WBC 10.65   RBC 3.58*   HGB 10.5*   HCT 31.7*      MCV 89   MCH 29.3   MCHC 33.1     Inflammatory Labs:  Recent Labs   Lab 06/03/22  0544   CRP 0.13     Monitor and Evaluation  Food and Nutrient Intake: food and beverage intake, energy intake  Food and Nutrient Adminstration: diet order  Physical Activity and Function: nutrition-related ADLs and IADLs, factors affecting access to physical activity  Anthropometric Measurements: weight, weight change, body mass index  Biochemical Data, Medical Tests and Procedures: electrolyte and renal panel, inflammatory profile, gastrointestinal profile, lipid profile, glucose/endocrine profile  Nutrition-Focused Physical Findings: overall appearance     Nutrition Risk  Level of Risk/Frequency of Follow-up: moderate- high     Nutrition Follow-Up  RD Follow-up?: Yes    Rebecca Whitaker, KRISS 06/07/2022 2:54 PM

## 2022-06-08 ENCOUNTER — TELEPHONE (OUTPATIENT)
Dept: SURGERY | Facility: CLINIC | Age: 77
End: 2022-06-08
Payer: MEDICARE

## 2022-06-08 VITALS
RESPIRATION RATE: 18 BRPM | DIASTOLIC BLOOD PRESSURE: 72 MMHG | SYSTOLIC BLOOD PRESSURE: 122 MMHG | OXYGEN SATURATION: 97 % | TEMPERATURE: 98 F | BODY MASS INDEX: 24.79 KG/M2 | HEIGHT: 65 IN | HEART RATE: 82 BPM | WEIGHT: 148.81 LBS

## 2022-06-08 LAB — BACTERIA SPEC ANAEROBE CULT: NORMAL

## 2022-06-08 PROCEDURE — 63700000 PHARM REV CODE 250 ALT 637 W/O HCPCS: Performed by: INTERNAL MEDICINE

## 2022-06-08 PROCEDURE — 25000003 PHARM REV CODE 250: Performed by: INTERNAL MEDICINE

## 2022-06-08 PROCEDURE — 63600175 PHARM REV CODE 636 W HCPCS: Performed by: INTERNAL MEDICINE

## 2022-06-08 RX ORDER — FLUCONAZOLE 200 MG/1
200 TABLET ORAL DAILY
Qty: 30 TABLET | Refills: 0 | Status: SHIPPED | OUTPATIENT
Start: 2022-06-09 | End: 2022-07-09

## 2022-06-08 RX ADMIN — PANTOPRAZOLE SODIUM 40 MG: 40 TABLET, DELAYED RELEASE ORAL at 05:06

## 2022-06-08 RX ADMIN — EZETIMIBE 10 MG: 10 TABLET ORAL at 09:06

## 2022-06-08 RX ADMIN — ATORVASTATIN CALCIUM 40 MG: 40 TABLET, FILM COATED ORAL at 09:06

## 2022-06-08 RX ADMIN — LOSARTAN POTASSIUM 50 MG: 50 TABLET, FILM COATED ORAL at 09:06

## 2022-06-08 RX ADMIN — HYDROCHLOROTHIAZIDE 12.5 MG: 12.5 TABLET ORAL at 09:06

## 2022-06-08 RX ADMIN — POLYETHYLENE GLYCOL 3350 17 G: 17 POWDER, FOR SOLUTION ORAL at 09:06

## 2022-06-08 RX ADMIN — AMLODIPINE BESYLATE 5 MG: 5 TABLET ORAL at 09:06

## 2022-06-08 RX ADMIN — FLUTICASONE PROPIONATE 50 MCG: 50 SPRAY, METERED NASAL at 09:06

## 2022-06-08 RX ADMIN — PIPERACILLIN SODIUM,TAZOBACTAM SODIUM 3.38 G: 3; .375 INJECTION, POWDER, FOR SOLUTION INTRAVENOUS at 09:06

## 2022-06-08 RX ADMIN — CARVEDILOL 6.25 MG: 6.25 TABLET, FILM COATED ORAL at 09:06

## 2022-06-08 RX ADMIN — Medication 5000 UNITS: at 09:06

## 2022-06-08 RX ADMIN — PIPERACILLIN SODIUM,TAZOBACTAM SODIUM 3.38 G: 3; .375 INJECTION, POWDER, FOR SOLUTION INTRAVENOUS at 02:06

## 2022-06-08 RX ADMIN — FLUCONAZOLE 200 MG: 100 TABLET ORAL at 09:06

## 2022-06-08 NOTE — PROGRESS NOTES
[facility]  General Surgery  Progress Note    Subjective:     Interval History: Patient states she is feeling better today. The lower abdominal pain improved. Afebrile. WBC stable.   Momo output little less. It is purulent.     Post-Op Info:  * No surgery found *          Medications:  Continuous Infusions:  Scheduled Meds:   amLODIPine  5 mg Oral Daily    atorvastatin  40 mg Oral Daily    carvediloL  6.25 mg Oral BID    cholecalciferol (vitamin D3)  5,000 Units Oral Daily    ezetimibe  10 mg Oral Daily    fluconazole  200 mg Oral Daily    fluticasone propionate  1 spray Each Nostril Daily    hydroCHLOROthiazide  12.5 mg Oral Daily    losartan  50 mg Oral Daily    pantoprazole  40 mg Oral Daily    piperacillin-tazobactam (ZOSYN) IVPB  3.375 g Intravenous Q8H    polyethylene glycol  17 g Oral TID     PRN Meds:acetaminophen, gabapentin, magnesium oxide, magnesium oxide, melatonin, naloxone, ondansetron, potassium bicarbonate, potassium bicarbonate, potassium bicarbonate, potassium, sodium phosphates, potassium, sodium phosphates, potassium, sodium phosphates, tiZANidine     Objective:     Vital Signs (Most Recent):  Temp: 97.8 °F (36.6 °C) (06/07/22 1918)  Pulse: 99 (06/07/22 1918)  Resp: 18 (06/07/22 1918)  BP: 130/69 (06/07/22 1918)  SpO2: 97 % (06/07/22 1918) Vital Signs (24h Range):  Temp:  [97.7 °F (36.5 °C)-98.3 °F (36.8 °C)] 97.8 °F (36.6 °C)  Pulse:  [73-99] 99  Resp:  [18] 18  SpO2:  [95 %-100 %] 97 %  BP: (103-151)/(54-74) 130/69       Intake/Output Summary (Last 24 hours) at 6/7/2022 2057  Last data filed at 6/7/2022 1754  Gross per 24 hour   Intake 840 ml   Output 1400 ml   Net -560 ml       Physical Exam  Vitals reviewed.   Constitutional:       General: She is not in acute distress.  Pulmonary:      Effort: Pulmonary effort is normal.   Abdominal:      General: There is no distension.      Palpations: Abdomen is soft.      Tenderness: There is abdominal tenderness. There is no  guarding.          Comments: Drain is purulent.   abdomen is tender LLQ - mild. No guarding    Neurological:      Mental Status: She is alert.         Significant Labs:  CBC:   Recent Labs   Lab 06/07/22  0759   WBC 10.65   RBC 3.58*   HGB 10.5*   HCT 31.7*      MCV 89   MCH 29.3   MCHC 33.1     CMP:   Recent Labs   Lab 06/07/22  0759   *   CALCIUM 9.7      K 3.5   CO2 23      BUN 13   CREATININE 0.7       Significant Diagnostics:  I have reviewed all pertinent imaging results/findings within the past 24 hours.    Assessment/Plan:     Active Diagnoses:    Diagnosis Date Noted POA    PRINCIPAL PROBLEM:  Intra-abdominal abscess [K65.1] 05/31/2022 Yes    History of diverticulitis of colon [Z87.19] 05/31/2022 Not Applicable    Diverticulitis [K57.92] 04/05/2022 Unknown    MS (multiple sclerosis) [G35] 12/12/2014 Yes      Problems Resolved During this Admission:     -OK to DC with outpatient IV antibiotics. Will follow up after antibiotics are complete. Will order surveillance imaging and follow the drain.     Antonio Mott III, MD  General Surgery  [facility]

## 2022-06-08 NOTE — PLAN OF CARE
Roberto spoke with Margaret with [facility]. She will accept pt and she has accepted via [facility].       06/08/22 154   Post-Acute Status   Post-Acute Authorization Home Health   Post-Acute Placement Status Set-up Complete/Auth obtained

## 2022-06-08 NOTE — TELEPHONE ENCOUNTER
----- Message from Yogesh Lopes sent at 6/8/2022  3:37 PM CDT -----  Contact: [facility]  Type:  Sooner Appointment Request    Caller is requesting a sooner appointment.  Caller declined first available appointment listed below.  Caller will not accept being placed on the waitlist and is requesting a message be sent to doctor.    Name of Caller:  [facility]  When is the first available appointment?  6/21  Symptoms:  Hosp follow up  Best Call Back Number:  [phone]   Additional Information:  Directed to follow up within 1wk

## 2022-06-08 NOTE — DISCHARGE INSTRUCTIONS
Please follow-up in emergency department if you should experience any significant worsening of abdominal pain, fevers, spreading redness around wound site.  I would like you to follow-up with both Infectious Disease and General surgery in about 1 week for re-evaluation.    Home health has been ordered to help with monitoring and dressing changes.

## 2022-06-08 NOTE — TELEPHONE ENCOUNTER
Spoke with patient, advised soonest availability with Dr Arcos or Dr Mott is June 21st.  I can schedule with Janet DORSEY next week and there will be a surgeon available if needed.  Appointment booked 6-16-22 at 930 am with Janet Parra at [facility]

## 2022-06-08 NOTE — PLAN OF CARE
Roberto spoke with Melissa t [facility], need wound care orders for RADHAMES drain. Roberto sent secure message to Dr. Ham and spoke with Pauly RODRIGES-Wound care nurse. Pending additional orders for wound care. Roberto will continue following.

## 2022-06-08 NOTE — PLAN OF CARE
Cm communicated with pt about hh on discharge. Wife agreed and signed the disclosure form.  Roberto sent referral to [facility] for hh via [facility]port.         06/08/22 7820   Post-Acute Status   Post-Acute Authorization Home Health   Home Health Status Referrals Sent

## 2022-06-08 NOTE — HOSPITAL COURSE
77-year-old with above past medical history significant for diverticulitis presenting with worsening abdominal pain.  Noted to have fluid collection suspected to be an abscess on her abdominal CT:  thick-walled collection of gas in left pelvis, intimately associated with the superior bladder, measuring 3.7 x 2.9 by 3 cm  Patient was started on Zosyn and admitted to the hospital.  General surgery was consulted.  Infectious Disease consulted.  General surgery felt that they could not access the abscess easily and that IR procedure would be warranted.  I are consulted.  Patient spent several days on Zosyn as IR wanted her to be off of anti-platelet therapy for 5 days prior to procedure.  Procedure was done and pigtail catheter placed.  Initially patient had drainage of cloudy bloody fluid.  This then transition to brownish purulent material that was feculent.  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    Prior to discharge discussed with surgery and Infectious Disease.  They felt that outpatient treatment with Zosyn was appropriate.  Once inflammation had come down, surgery will likely plan for definitive treatment.    At time of discharge, patient was doing well with minimal abdominal discomfort.  No leukocytosis.  Electrolytes normal.  A aerobic culture of abscess material grew out HAEMOPHILUS PARAINFLUENZAE BIOTYPE I and Gram-negative magali non lactose .    Dual anti-platelet therapy was restarted prior to discharge.  For the safety, home health was ordered for wound care and have an extra set of eyes for intermittent evaluation.    She was asked to follow up with Infectious Disease and surgery in 1 week time.    General:  Alert and oriented x4.  No acute distress  HEENT:  Normocephalic  Cardiovascular:  Regular rate and rhythm, no murmurs rubs or gallops.  No lower extremity edema.  Pulmonary:  Clear to auscultation bilaterally  Abdomen:  Soft, nontender, nondistended.  No  guarding.  No rebound.  Negative Olmedo's.  Positive bowel sounds.  RADHAMES drain with purulent brown foul-smelling material  Extremity:  Moves all extremities equally.  Dermatology:  No rashes appreciated on exposed skin  Psychiatric:  Normal affect

## 2022-06-08 NOTE — PLAN OF CARE
Pt is cleared from  for discharge.    ID and surgery  department will notify pt of appointments.    [facility]  set-up       06/08/22 1544   Final Note   Assessment Type Final Discharge Note   Anticipated Discharge Disposition Home-Health   Hospital Resources/Appts/Education Provided Appointments scheduled by Navigator/Coordinator

## 2022-06-08 NOTE — PROGRESS NOTES
Progress Note  Infectious Disease    Reason for Consult:  Diverticulitis with abscess    HPI: Tayla Ewing is a 77 y.o. female was admitted in early April with acute diverticulitis and microperforation.  She received in hospital care for several days then 10 days of IV ertapenem daily at.  She saw General surgery in follow-up with complete resolution for midline removal.  on 05/28 she requested treatment tract infection care unfortunately she had not yet established.  She presented to the emergency room on 05/31 with complaints urinary frequency and abdominal of 2 weeks duration.  Her evaluation in emergency included a urinalysis which was unremarkable, CT scan of the thick-walled collection of gas in left pelvis intimately associated the superior margin of the bladder suspicious for abscess (3 cm in longest dimension) and findings of acute diverticulitis in sigmoid colon, slightly less extensive than that April.  She was admitted to Medicine placed on Zosyn.  She was seen by General surgery yesterday who recommended drainage.  Because she is Brilinta, she will need to be off this for 5 days plus the weekend this procedure Monday June 6th.  She has no fever and white blood cells which were 12,900 on admission 10,300 today.  Blood cultures are negative.    6/3: interim reviewed. picc placed. IR procedure pending Brilinta wearing off. She has no new complaints. Home IV being arranged.   06/04/2022 Dr Lucia afebrile  She feels bloated and constipated. Sp ir drain today. She deinies ever having stools in the urine or vagina.  06/05/2022 DrGjino  Afebrile, pleasant,  Hard of hearing, Daughter, Jagruti helps me explain plan to patient  + abdominal soreness on LLQ yesterday, and RLQ today  + spider like and light to the left eye  resolved. She has h/o Right retinal detachment. I asked daughter to make an appointment tomorrow am with her ophthalmologist    06/06/2022.  Dr. Lucia.  Patient is afebrile.  The material coming  out of the intra-abdominal drain has been foul-smelling.  There were no cultures sent because there was not enough coming out initially, from the drain.(will try to collect now, but it will not be a perfect sample)  T-max 99.9°.  Patient would like to come to [facility] for blood work  The visual complaints (she had yesterday like a spider like vision and some light) have completely resolved.  She plans to schedule an appointment with ophthalmologist this week.  She already has an appointment with Dr. Gibbons gastroenterology  06/07/2022.  The lower abdominal drain has foul-smelling thick liquid, grayish like, 50 cc yesterday and 10 cc today.  Patient would like to go home but she is concerned how will she handle the lower abdominal drain, especially when it starts leaking around.  Discussed with general surgery  06/08/2022.  Afebrile.  WBC normal.  RADHAMES is still in .  General surgery is monitoring it.  Cx == 2 gnr. Patietn and   Have questions which there were all answered. Patient is a little nervous about iV abx , labs  etc    Antibiotics (From admission, onward)            Start     Stop Route Frequency Ordered    06/01/22 0200  piperacillin-tazobactam 3.375 g in dextrose 5 % 50 mL IVPB (ready to mix system)         -- IV Every 8 hours (non-standard times) 05/31/22 1844        Antifungals (From admission, onward)            Start     Stop Route Frequency Ordered    06/03/22 0900  fluconazole tablet 200 mg         -- Oral Daily 06/02/22 2107        Antivirals (From admission, onward)    None          EXAM & DIAGNOSTICS REVIEWED:   Vitals:     Temp:  [97.7 °F (36.5 °C)-98.4 °F (36.9 °C)]   Temp: 97.7 °F (36.5 °C) (06/08/22 1115)  Pulse: 80 (06/08/22 1115)  Resp: 18 (06/08/22 1115)  BP: 121/66 (06/08/22 1115)  SpO2: 99 % (06/08/22 1115)    Intake/Output Summary (Last 24 hours) at 6/8/2022 1201  Last data filed at 6/8/2022 0800  Gross per 24 hour   Intake 480 ml   Output 910 ml   Net -430 ml     Output by Drain (mL)  06/06/22 0701 - 06/06/22 1900 06/06/22 1901 - 06/07/22 0700 06/07/22 0701 - 06/07/22 1900 06/07/22 1901 - 06/08/22 0700 06/08/22 0701 - 06/08/22 1201        Closed/Suction Drain 06/04/22 0843 Anterior Hip Bulb 8 Fr. 10   10      General:  In NAD. Alert and attentive, cooperative, comfortable  Eyes:  Anicteric, EOMI  ENT:  Hard of hearing daughter and  help.  Patient can understand very well when she can read lips  Neck:  Supple,   Lungs: Clear,  Heart:  RRR,   Abd:  + drain in lower abdomen, now it looks foul-smelling pus; slighltly sore over RLQ and left lower quadrant Soft, non toxic  ND, normal BS, no masses or organomegaly appreciated.  :  Voids  no flank tenderness  Musc:  Joints without effusion, swelling, erythema, synovitis, muscle wasting.   Skin:  No rashes.    Neuro: Alert, attentive, speech fluent, face symmetric, moves all extremities, no focal weakness. Ambulatory  Psych:  Calm, cooperative  Lymphatic:      Extrem: No edema, erythema,  VAD:  picc line     Isolation:  none  Wound:            General Labs reviewed:  Recent Labs   Lab 06/02/22  0338 06/03/22  0544 06/07/22  0759   WBC 10.31 9.00 10.65   HGB 10.8* 10.8* 10.5*   HCT 32.8* 32.8* 31.7*    294 266       Recent Labs   Lab 06/02/22  0338 06/03/22  0544 06/07/22  0759   * 135* 136   K 3.6 3.6 3.5    101 102   CO2 21* 22* 23   BUN 18 15 13   CREATININE 1.0 0.9 0.7   CALCIUM 8.9 9.5 9.7     Recent Labs   Lab 06/03/22  0544   CRP 0.13     Micro:  Microbiology Results (last 7 days)     Procedure Component Value Units Date/Time    Aerobic culture [[id number]]  (Abnormal) Collected: 06/06/22 1450    Order Status: Completed Specimen: Abscess Updated: 06/08/22 0802     Aerobic Bacterial Culture HAEMOPHILUS PARAINFLUENZAE BIOTYPE I  Moderate  Identification pending  Beta Lactamase negative        GRAM NEGATIVE JARED, NON-LACTOSE   Moderate  Identification and susceptibility pending      Culture, Anaerobe [[id number]]  Collected: 06/06/22 1452    Order Status: Sent Specimen: Abscess from Abdomen Updated: 06/06/22 1508    Aerobic culture [[id number]]     Order Status: Canceled Specimen: Abdominal from Abscess     Culture, Anaerobe [[id number]]     Order Status: Canceled Specimen: Abdominal from Abdomen     Blood culture #2 **CANNOT BE ORDERED STAT** [[id number]] Collected: 05/31/22 1753    Order Status: Completed Specimen: Blood from Peripheral, Antecubital, Right Updated: 06/05/22 1832     Blood Culture, Routine No growth after 5 days.    Blood Culture #1 **CANNOT BE ORDERED STAT** [[id number]] Collected: 05/31/22 1725    Order Status: Completed Specimen: Blood from Peripheral, Antecubital, Left Updated: 06/05/22 1832     Blood Culture, Routine No growth after 5 days.    Culture, Anaerobe [[id number]]     Order Status: Canceled Specimen: Abscess from Abdomen     Aerobic culture [[id number]]     Order Status: Canceled Specimen: Abscess from Abdomen           Imaging Reviewed:   CXR   CT abdomen and pelvis 5/31  1. Development of thick-walled collection of gas in left pelvis, intimately associated with the superior bladder, measuring 3.7 x 2.9 by 3 cm, suspicious for abscess.  2. Findings characteristic of acute diverticulitis of the proximal sigmoid colon, slightly less extensive than that on 04/05/2022 CT.    Cardiology:    IMPRESSION & PLAN   1.  Diverticulitis  with micro perforation 4/5 persistent, pelvic abscess, 3.7x2.9x3 cm   S/p IR drainage 06/04=less than 1 cc of purulent material.    Cultures from drain, not a perfect culture, is showing Haemophilus parainfluenza.  Of course this infection is polimicrobial.    Recommendations:  Continue Zosyn iv  + Diflucan po  Abx x 4 weeks , but it may be shortened  duration, depending how well she does.  Infusion company: [facility]  blood draw, asu vs Home health?    Serial CT abd and pelvis; next CT around 06/14= 2 week tran from the most recent CT  Weekly labs x4  : ESR, CRP, CBC  diff  For follow up  In ID clinic with me or Dr Craig     Consideration sigmoid colectomy in the near future, after colonoscopy- patient aware. She will discuss dr Ruiz      Also follow with ophthalmology re left eye complains, which happened x1 day, on and resolved

## 2022-06-08 NOTE — DISCHARGE SUMMARY
Formerly [facility], later [facility]  Discharge Summary      Patient Name: Tayla Ewing  MRN: [id number]  Patient Class: IP- Inpatient  Admission Date: 5/31/2022  Hospital Length of Stay: 8 days  Discharge Date and Time: No discharge date for patient encounter.  Attending Physician: Bruce Ham MD   Discharging Provider: Bruce Ham MD  Primary Care Provider: Marcel Mueller MD      HPI:   Patient is a 77-year-old  female with history of multiple sclerosis who was recently managed for acute diverticulitis presents to the ED with chief complaint of ongoing abdominal pain.    She was admitted at our facility in the 1st week of April for left-sided abdominal pain and workup revealed diverticulitis of the proximal sigmoid colon with microperforation.  She was evaluated by General surgery and received intravenous antibiotics.  Did not require surgical intervention.  Her clinical condition improved and she was discharged home to finish 10 day course of intravenous ertapenem which she successfully completed.  She also saw General surgery as a follow-up after her discharge at which time her pain was well controlled.  She saw Dr. Gibbons from Gastroenterology who is planning on colonoscopy in the future.    Patient reports worsening of her abdominal pain over the last several days.  Unable to characterize exact timing.  Pain is intermittent.  Worse with certain types of food intake and better with acetaminophen/ibuprofen.  Denies diarrhea or constipation and she is using fiber capsules.  She denies fever or chills.    Rest of the 10 point review of systems is negative except as mentioned above.      * No surgery found *      Hospital Course:   77-year-old with above past medical history significant for diverticulitis presenting with worsening abdominal pain.  Noted to have fluid collection suspected to be an abscess on her abdominal CT:  thick-walled collection of gas in left pelvis, intimately  associated with the superior bladder, measuring 3.7 x 2.9 by 3 cm  Patient was started on Zosyn and admitted to the hospital.  General surgery was consulted.  Infectious Disease consulted.  General surgery felt that they could not access the abscess easily and that IR procedure would be warranted.  I are consulted.  Patient spent several days on Zosyn as IR wanted her to be off of anti-platelet therapy for 5 days prior to procedure.  Procedure was done and pigtail catheter placed.  Initially patient had drainage of cloudy bloody fluid.  This then transition to brownish purulent material that was feculent.  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    Prior to discharge discussed with surgery and Infectious Disease.  They felt that outpatient treatment with Zosyn was appropriate.  Once inflammation had come down, surgery will likely plan for definitive treatment.    At time of discharge, patient was doing well with minimal abdominal discomfort.  No leukocytosis.  Electrolytes normal.  A aerobic culture of abscess material grew out HAEMOPHILUS PARAINFLUENZAE BIOTYPE I and Gram-negative magali non lactose .    Dual anti-platelet therapy was restarted prior to discharge.  For the safety, home health was ordered for wound care and have an extra set of eyes for intermittent evaluation.    She was asked to follow up with Infectious Disease and surgery in 1 week time.    General:  Alert and oriented x4.  No acute distress  HEENT:  Normocephalic  Cardiovascular:  Regular rate and rhythm, no murmurs rubs or gallops.  No lower extremity edema.  Pulmonary:  Clear to auscultation bilaterally  Abdomen:  Soft, nontender, nondistended.  No guarding.  No rebound.  Negative Olmedo's.  Positive bowel sounds.  RADHAMES drain with purulent brown foul-smelling material  Extremity:  Moves all extremities equally.  Dermatology:  No rashes appreciated on exposed skin  Psychiatric:  Normal affect                Goals of Care Treatment Preferences:  Code Status: Full Code      Consults:   Consults (From admission, onward)        Status Ordering Provider     Inpatient consult to Social Work/Case Management  Once        Provider:  (Not yet assigned)    Acknowledged GJINO, NATHANIEL     Inpatient consult to Social Work/Case Management  Once        Provider:  (Not yet assigned)    Acknowledged GJINO, NATHANIEL     Inpatient consult to   Once        Provider:  (Not yet assigned)    Acknowledged GJJUANA, NATHANIEL     Inpatient consult to PICC Line Nurse  Once        Provider:  (Not yet assigned)    Acknowledged VEE CRAIG     Inpatient consult to Infectious Diseases  Once        Provider:  Vee Craig MD    Completed REINALDO TOMPKINS     Inpatient consult to Hospitalist  Once        Provider:  (Not yet assigned)    Acknowledged KEN STERLING     Inpatient consult to General surgery  Once        Provider:  Fortunato Arcos MD    Completed KEN STERLING          * Intra-abdominal abscess        History of diverticulitis of colon        Diverticulitis        MS (multiple sclerosis)        Final Active Diagnoses:    Diagnosis Date Noted POA    PRINCIPAL PROBLEM:  Intra-abdominal abscess [K65.1] 05/31/2022 Yes    History of diverticulitis of colon [Z87.19] 05/31/2022 Not Applicable    Diverticulitis [K57.92] 04/05/2022 Unknown    MS (multiple sclerosis) [G35] 12/12/2014 Yes      Problems Resolved During this Admission:       Discharged Condition: fair    Disposition: Home-Health Care [facility]    Follow Up:   Follow-up Information     Marcel Mueller MD Follow up.    Specialty: Family Medicine  Contact information:  [address]  [address]  [facility]  [place] [id number] [phone]             Vee Craig MD Follow up in 1 week(s).    Specialty: Infectious Diseases  Contact information:  [address] [facility]  [address]  [place] [address]  [phone]             Fortunato Arcos MD  Follow up in 1 week(s).    Specialties: General Surgery, Surgery  Contact information:  [address] [facility]  [address]  [facility] [address]  [phone]                       Patient Instructions:      Ambulatory referral/consult to Home Health   Standing Status: Future   Referral Priority: Routine Referral Type: Home Health Care   Referral Reason: Specialty Services Required   Requested Specialty: Home Health Services   Number of Visits Requested: 1       Significant Diagnostic Studies: Labs:   BMP:   Recent Labs   Lab 06/07/22  0759   *      K 3.5      CO2 23   BUN 13   CREATININE 0.7   CALCIUM 9.7   , CBC   Recent Labs   Lab 06/07/22  0759   WBC 10.65   HGB 10.5*   HCT 31.7*       and All labs within the past 24 hours have been reviewed    Pending Diagnostic Studies:     None         Medications:  Reconciled Home Medications:      Medication List      START taking these medications    fluconazole 200 MG Tab  Commonly known as: DIFLUCAN  Take 1 tablet (200 mg total) by mouth once daily.  Start taking on: June 9, 2022     PIPERACILLIN-TAZOBACTAM 3.375G/50ML D5W (READY TO MIX)  Inject 50 mLs (3.375 g total) into the vein every 8 (eight) hours.        CHANGE how you take these medications    BRILINTA 60 mg tablet  Generic drug: ticagrelor  Take 60 mg by mouth 2 (two) times daily.  What changed: Another medication with the same name was removed. Continue taking this medication, and follow the directions you see here.     CRESTOR 10 MG tablet  Generic drug: rosuvastatin  Take 10 mg by mouth once daily.  What changed: Another medication with the same name was removed. Continue taking this medication, and follow the directions you see here.     tiZANidine 2 mg Cap  TAKE 1 TO 2 CAPSULES BY MOUTH EVERY NIGHT AT BEDTIME AS NEEDED  What changed: See the new instructions.        CONTINUE taking these medications    amLODIPine 5 MG tablet  Commonly known as: NORVASC  Take 5 mg by mouth once daily.      aspirin 81 MG EC tablet  Commonly known as: ECOTRIN  Take 81 mg by mouth once daily.     b complex vitamins tablet  Take 1 tablet by mouth once daily.     carvediloL 6.25 MG tablet  Commonly known as: COREG  Take 6.25 mg by mouth 2 (two) times daily.     cholecalciferol (vitamin D3) 125 mcg (5,000 unit) Tab  Take 5,000 Units by mouth once daily.     ciprofloxacin HCl 500 MG tablet  Commonly known as: CIPRO  Take 500 mg by mouth 2 (two) times daily.     ezetimibe 10 mg tablet  Commonly known as: ZETIA  Take 10 mg by mouth once daily.     fluticasone propionate 50 mcg/actuation nasal spray  Commonly known as: FLONASE  1-2 sprays by Each Nostril route once daily.     gabapentin 100 MG capsule  Commonly known as: NEURONTIN  Take 1 capsule (100 mg total) by mouth 3 (three) times daily.     olmesartan-hydrochlorothiazide 20-12.5 mg per tablet  Commonly known as: BENICAR HCT  Take 1 tablet by mouth once daily.     pantoprazole 40 MG tablet  Commonly known as: PROTONIX  Take 40 mg by mouth once daily.     pulse oximeter device  Commonly known as: pulse oximeter  Use twice daily at 8 AM and 3 PM and [facility] as directed.     ZINC-15 ORAL  Take 1 tablet by mouth once daily.        STOP taking these medications    atorvastatin 80 MG tablet  Commonly known as: LIPITOR     azelastine 137 mcg (0.1 %) nasal spray  Commonly known as: ASTELIN     cefdinir 300 MG capsule  Commonly known as: OMNICEF     HYDROcodone-acetaminophen 5-325 mg per tablet  Commonly known as: NORCO     vitamin E 1000 UNIT capsule            Indwelling Lines/Drains at time of discharge:   Lines/Drains/Airways     Peripherally Inserted Central Catheter Line  Duration           PICC Double Lumen 06/03/22 0930 left basilic 5 days          Drain  Duration                Closed/Suction Drain 06/04/22 0843 Anterior Hip Bulb 8 Fr. 4 days                Time spent on the discharge of patient: 35 minutes         Bruce Ham MD  Department of  [facility]  [facility]

## 2022-06-09 LAB
BACTERIA SPEC AEROBE CULT: ABNORMAL
BACTERIA SPEC AEROBE CULT: ABNORMAL

## 2022-06-11 ENCOUNTER — HOSPITAL ENCOUNTER (EMERGENCY)
Facility: HOSPITAL | Age: 77
Discharge: HOME | End: 2022-06-11
Attending: EMERGENCY MEDICINE
Payer: MEDICARE

## 2022-06-11 VITALS
DIASTOLIC BLOOD PRESSURE: 78 MMHG | SYSTOLIC BLOOD PRESSURE: 138 MMHG | HEART RATE: 76 BPM | BODY MASS INDEX: 24.16 KG/M2 | TEMPERATURE: 98 F | WEIGHT: 145 LBS | OXYGEN SATURATION: 100 % | HEIGHT: 65 IN | RESPIRATION RATE: 16 BRPM

## 2022-06-11 DIAGNOSIS — T81.43XA POSTPROCEDURAL INTRAABDOMINAL ABSCESS: Primary | ICD-10-CM

## 2022-06-11 PROCEDURE — 99284 EMERGENCY DEPT VISIT MOD MDM: CPT | Mod: 25

## 2022-06-11 NOTE — ED PROVIDER NOTES
Encounter Date: 6/11/2022       History     Chief Complaint   Patient presents with    Wound Check     ABSCESS WITH DRAIN ATTACHED , STATES ITS DRAINING TOO MUCH     Patient here with reported percutaneous drain in a pericolonic abscess placed by CT guidance a week ago  reports that drainage has been improving daily in the suction bulb attached to the drain which she empties today they noted leakage around the catheter site states that the dressing was changed yesterday by the home health nurse the drip drainage was significant neck prompting him to come to the emergency department to assess function of the catheter  does report that there has been little drainage in the bulb today but there is a fair amount of drainage on the dressing that was applied by the  prior to arrival no fever chills no abdominal pain no other complaints at this time        Review of patient's allergies indicates:  No Known Allergies  Past Medical History:   Diagnosis Date    Diverticulitis of colon with perforation 04/05/2022    History of heart artery stent 9/6/2018    History of heart attack 9/6/2018    Hypertension     Intra-abdominal abscess 05/31/2022    post diverticulitis    Multiple sclerosis     Neurogenic bladder 6/24/2015     Past Surgical History:   Procedure Laterality Date    BRAIN BIOPSY      2009    HYSTERECTOMY       Family History   Problem Relation Age of Onset    Hypertension Mother      Social History     Tobacco Use    Smoking status: Never Smoker    Smokeless tobacco: Never Used   Substance Use Topics    Alcohol use: No     Review of Systems   Constitutional: Negative for chills and fever.   Gastrointestinal: Negative for abdominal pain, diarrhea, nausea and vomiting.   All other systems reviewed and are negative.      Physical Exam     Initial Vitals [06/11/22 1210]   BP Pulse Resp Temp SpO2   137/85 71 20 97.5 °F (36.4 °C) 100 %      MAP       --         Physical  Exam    Constitutional: She appears well-developed and well-nourished. No distress.   HENT:   Head: Normocephalic and atraumatic.   Right Ear: External ear normal.   Left Ear: External ear normal.   Mouth/Throat: Oropharynx is clear and moist.   Cardiovascular: Normal rate, regular rhythm, normal heart sounds and intact distal pulses.   Pulmonary/Chest: Breath sounds normal. No respiratory distress.   Abdominal: Abdomen is soft. Bowel sounds are normal. There is no abdominal tenderness.   Percutaneous drain in the suprapubic region with significant purulent drainage soaking the dressing no erythema no induration   Musculoskeletal:         General: No edema. Normal range of motion.     Neurological: She is alert and oriented to person, place, and time. GCS score is 15. GCS eye subscore is 4. GCS verbal subscore is 5. GCS motor subscore is 6.   Skin: Skin is warm and dry. Capillary refill takes less than 2 seconds. No rash noted.   Psychiatric: She has a normal mood and affect. Her behavior is normal.         ED Course   Procedures  Labs Reviewed - No data to display       Imaging Results          CT Abdomen Pelvis  Without Contrast (Final result)  Result time 06/11/22 15:25:36    Final result by Nawaf Lala MD (06/11/22 15:25:36)                 Narrative:    CMS MANDATED QUALITY DATA - CT RADIATION - 436    All CT scans at this facility utilize dose modulation, iterative reconstruction, and/or weight based dosing when appropriate to reduce radiation dose to as low as reasonably achievable.        Reason: Abdominal abscess/infection suspected    TECHNIQUE: CT abdomen and pelvis without IV or oral contrast. Study is tailored for detection of urinary tract calculi and evaluation of solid organs, hollow viscera, and vascular structures is limited.    COMPARISON: 5/31/2022, 6/4/2022    CT ABDOMEN:  Coronary artery calcifications evident along with tiny hiatal hernia. Visualized lung bases are clear.    Noncontrast  liver, gallbladder, pancreas, spleen, and adrenals are normal. Very mild right pelvocaliectasis unchanged. Superior right renal cortical thinning suggests scarring. Noncontrast kidneys otherwise unremarkable. Aortoiliac calcifications are mild and.    Small intestines are unremarkable. Normal appendix is present.    Degenerative changes affect the lumbar spine.    CT PELVIS:  Distal loop of the pigtail catheter lies within thick-walled collection containing stippled foci of gas and heterogeneous density currently measuring 5.6 x 3.4 cm, not significantly changed since 5/31/2022. Superolateral aspect of this collection courses to the proximal sigmoid colon (series 3 images 162-165) suggestive of a fistula.    Diverticula arise from the colon. Very mild inflammatory fat stranding about the proximal sigmoid colon as continued to improve.    This thick-walled collection remains with intermittent association with the superior bladder wall, with bladder otherwise unremarkable. Uterus has been removed. Left adnexal mass containing fat density and calcification, characteristic of mature cystic teratoma (ovarian dermoid) is unchanged. Tiny volume free pelvic fluid is present.    No acute osseous abnormality.    IMPRESSION:    1. Little change of abscess and pelvis, intimately associated with superior bladder, despite recent percutaneous drainage. 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  2. Coronary artery calcifications.  3. Diverticulosis with improving pericolonic fat stranding about the proximal sigmoid colon. The appearance suggests improving acute diverticulitis.    Electronically signed by:  Nawaf Lala MD  6/11/2022 3:25 PM CDT Workstation: [id number]                               Medications - No data to display  Medical Decision Making:   ED Management:  Patient's CT scan shows appropriate positioning of the percutaneous drain however the abscess is similar in size  and now raises concern for a possible fistula I discussed these findings with Dr. Avina given patient's lack of pain no fever he feels patient can be followed closely in the clinic he will notify the clinic of patient's need for follow-up                      Clinical Impression:   Final diagnoses:  [T81.43XA] Postprocedural intraabdominal abscess (Primary)          ED Disposition Condition    Discharge Stable        ED Prescriptions     None        Follow-up Information    None          Lokesh Wilson MD  06/11/22 6623

## 2022-06-15 ENCOUNTER — TELEPHONE (OUTPATIENT)
Dept: INFECTIOUS DISEASES | Facility: CLINIC | Age: 77
End: 2022-06-15

## 2022-06-15 ENCOUNTER — OFFICE VISIT (OUTPATIENT)
Dept: PRIMARY CARE CLINIC | Facility: CLINIC | Age: 77
End: 2022-06-15
Payer: MEDICARE

## 2022-06-15 VITALS
BODY MASS INDEX: 25.29 KG/M2 | HEIGHT: 65 IN | OXYGEN SATURATION: 99 % | RESPIRATION RATE: 18 BRPM | DIASTOLIC BLOOD PRESSURE: 60 MMHG | SYSTOLIC BLOOD PRESSURE: 130 MMHG | HEART RATE: 67 BPM | WEIGHT: 151.81 LBS | TEMPERATURE: 98 F

## 2022-06-15 DIAGNOSIS — Z11.59 NEED FOR HEPATITIS C SCREENING TEST: Primary | ICD-10-CM

## 2022-06-15 DIAGNOSIS — N31.9 NEUROGENIC BLADDER: ICD-10-CM

## 2022-06-15 DIAGNOSIS — Z95.5 HISTORY OF HEART ARTERY STENT: ICD-10-CM

## 2022-06-15 DIAGNOSIS — E55.9 VITAMIN D INSUFFICIENCY: ICD-10-CM

## 2022-06-15 DIAGNOSIS — K65.1 INTRA-ABDOMINAL ABSCESS: ICD-10-CM

## 2022-06-15 DIAGNOSIS — G35 MS (MULTIPLE SCLEROSIS): ICD-10-CM

## 2022-06-15 DIAGNOSIS — Z87.19 HISTORY OF DIVERTICULITIS OF COLON: ICD-10-CM

## 2022-06-15 PROCEDURE — 99214 PR OFFICE/OUTPT VISIT, EST, LEVL IV, 30-39 MIN: ICD-10-PCS | Mod: S$PBB,,, | Performed by: FAMILY MEDICINE

## 2022-06-15 PROCEDURE — 99214 OFFICE O/P EST MOD 30 MIN: CPT | Mod: S$PBB,,, | Performed by: FAMILY MEDICINE

## 2022-06-15 PROCEDURE — 99215 OFFICE O/P EST HI 40 MIN: CPT | Mod: PBBFAC,PN | Performed by: FAMILY MEDICINE

## 2022-06-15 PROCEDURE — 99999 PR PBB SHADOW E&M-EST. PATIENT-LVL V: CPT | Mod: PBBFAC,,, | Performed by: FAMILY MEDICINE

## 2022-06-15 PROCEDURE — 99999 PR PBB SHADOW E&M-EST. PATIENT-LVL V: ICD-10-PCS | Mod: PBBFAC,,, | Performed by: FAMILY MEDICINE

## 2022-06-15 NOTE — TELEPHONE ENCOUNTER
"CRP was slightly higher than before.  I called patient but she did not answer.  I called her daughter Ms. Chand.    Patient felt there was too much drainage surrounding the drain.  Daughter concerned about the suspected fistula  on the most recent CT.        Ct " Little change of abscess and pelvis, intimately associated with superior bladder, despite recent percutaneous drainage. 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  2. Coronary artery calcifications.  3. Diverticulosis with improving pericolonic fat stranding about the proximal sigmoid colon. The appearance suggests improving acute diverticulitis.     "

## 2022-06-15 NOTE — PROGRESS NOTES
Subjective:       Patient ID: Tayla Ewing is a 77 y.o. female.    Chief Complaint: Hospital Follow Up    HPI:  [id number]--04/05/2022 developed diverticulitis--admitted to the hospital placed on IV AB --patient was sent home with IV antibiotics x 10 days .   May 31, 2022 patient had diverticulosis with perforated with intrabdominal abscess. Drain placed   June 11 th due drain leaking --patient was told to flush tubing since that time has not had problems with leaking.  Appt tomorrow Dr Fortunato Gonsalez surgeon --has appt with his assistant --will evaluation drain --and pt suppose have surgery once abscess cleared  Appt Dr Pino infectious dz 1 week later decide when off AB and when abscess ccleared so able have GI surgery.       Eating okay--+BM--+ambulating well--after eatting does have some loose bowel movements      Lab done yesterday CBCs CMP CRP sed rate--potassium 3.4--CRP 13--sed rate 79    ROS:  Skin: no psoriasis, eczema, skin cancer  HEENT: +occas  Headache things from sinus,no  ocular pain, blurred vision, diplopia, epistaxis, hoarseness change in voice, thyroid trouble +hearing loss  Lung: No pneumonia, asthma, Tb, wheezing, SOB, no smoking  Heart: No chest pain, ankle edema, palpitations, MI, jhon murmur, hypertension, hyperlipidemia history mi history CAD with stent no bypass no arrhythmia history vitamin-D deficiency  Abdomen: No nausea, vomiting, diarrhea, constipation, ulcers, hepatitis, gallbladder disease, melena, hematochezia, hematemesis +loose bowel movements occasionally after each meal history of IBS history diverticulitis with subsequent abscess with subsequent drain and IV antibiotic see history of present illness  : no UTI, renal disease, stones  Gyn hyst   MS: no fractures, O/A, lupus, rheumatoid, gout  Neuro: No dizziness, LOC, seizures + multiple sclerosis  No diabetes, no anemia, no anxiety, no depression     Objective:   Physical Exam:  General: Well nourished, well  developed, no acute distress  Skin: No lesions  HEENT: Eyes PERRLA, EOM intact, nose patent, throat non-erythematous ears TMs clear--poor hearing  NECK: Supple, no bruits, No JVD, no nodes  Lungs: Clear, no rales, rhonchi, wheezing  Heart: Regular rate and rhythm, no murmurs, gallops, or rubs  Abdomen: flat, bowel sounds positive, no tenderness, or organomegaly--drain present--suprapubic area  MS: Range of motion and muscle strength intact  Neuro: Alert, CN intact, oriented X 3  Extremities: No cyanosis, clubbing, or edema         Assessment:       1. Need for hepatitis C screening test    2. History of diverticulitis of colon    3. Intra-abdominal abscess    4. Vitamin D insufficiency    5. Neurogenic bladder    6. History of heart artery stent    7. MS (multiple sclerosis)        Plan:       Need for hepatitis C screening test  -     Hepatitis C Antibody; Future; Expected date: 06/15/2022    History of diverticulitis of colon    Intra-abdominal abscess    Vitamin D insufficiency    Neurogenic bladder    History of heart artery stent    MS (multiple sclerosis)        04/05/2022--diverticulitis--treated with IV antibiotic  05/31/2022 diverticulitis with perforation--treated with IV antibiotic drain placed suprapubic area  06/11/2022--drain leaking--seen in the emergency room--told to flush drain since then has been doing fine  Appointment-general surgery tomorrow with Dr. Maynard's assistant --told once abscess is clear surgery would be performed they will evaluate the drain  Appointment 06/23/2022--infectious Disease--Dr Pino --need to evaluate see if abscess is resolved  Recent lab CBCs CMP CRP sed rate  Hypokalemia potassium 3.4 has potassium at home has not been taking it will start  CRP 13 sed rate 79--Infectious Disease to evaluate  Patient advised no reason to  follow-up with us in near future   Appt 3 months with DR Mueller   Health maintenance bone density but no need to do at this time hepatitis C COVID  vaccine tetanus vaccine bone density shingles vaccine pneumococcal vaccine  Lab will be left up to surgery Infectious Disease

## 2022-06-16 ENCOUNTER — OFFICE VISIT (OUTPATIENT)
Dept: SURGERY | Facility: CLINIC | Age: 77
End: 2022-06-16
Payer: MEDICARE

## 2022-06-16 ENCOUNTER — TELEPHONE (OUTPATIENT)
Dept: SURGERY | Facility: CLINIC | Age: 77
End: 2022-06-16
Payer: MEDICARE

## 2022-06-16 ENCOUNTER — LAB VISIT (OUTPATIENT)
Dept: LAB | Facility: HOSPITAL | Age: 77
End: 2022-06-16
Attending: INTERNAL MEDICINE
Payer: MEDICARE

## 2022-06-16 VITALS
HEIGHT: 65 IN | SYSTOLIC BLOOD PRESSURE: 153 MMHG | WEIGHT: 151.88 LBS | HEART RATE: 82 BPM | DIASTOLIC BLOOD PRESSURE: 76 MMHG | BODY MASS INDEX: 25.3 KG/M2 | TEMPERATURE: 97 F

## 2022-06-16 DIAGNOSIS — G35 MULTIPLE SCLEROSIS: ICD-10-CM

## 2022-06-16 DIAGNOSIS — I10 ESSENTIAL HYPERTENSION, MALIGNANT: ICD-10-CM

## 2022-06-16 DIAGNOSIS — M79.605 LEFT LEG PAIN: ICD-10-CM

## 2022-06-16 DIAGNOSIS — I25.10 CORONARY ATHEROSCLEROSIS OF NATIVE CORONARY ARTERY: Primary | ICD-10-CM

## 2022-06-16 DIAGNOSIS — K57.32 SIGMOID DIVERTICULITIS: Primary | ICD-10-CM

## 2022-06-16 DIAGNOSIS — E55.9 AVITAMINOSIS D: ICD-10-CM

## 2022-06-16 DIAGNOSIS — K57.20 COLONIC DIVERTICULAR ABSCESS: ICD-10-CM

## 2022-06-16 DIAGNOSIS — E78.2 MIXED HYPERLIPIDEMIA: ICD-10-CM

## 2022-06-16 LAB
ALBUMIN SERPL BCP-MCNC: 3.7 G/DL (ref 3.5–5.2)
ALP SERPL-CCNC: 64 U/L (ref 55–135)
ALT SERPL W/O P-5'-P-CCNC: 21 U/L (ref 10–44)
ANION GAP SERPL CALC-SCNC: 10 MMOL/L (ref 8–16)
AST SERPL-CCNC: 19 U/L (ref 10–40)
BASOPHILS # BLD AUTO: 0.06 K/UL (ref 0–0.2)
BASOPHILS NFR BLD: 0.8 % (ref 0–1.9)
BILIRUB SERPL-MCNC: 0.6 MG/DL (ref 0.1–1)
BUN SERPL-MCNC: 13 MG/DL (ref 8–23)
CALCIUM SERPL-MCNC: 9.6 MG/DL (ref 8.7–10.5)
CHLORIDE SERPL-SCNC: 103 MMOL/L (ref 95–110)
CHOLEST SERPL-MCNC: 95 MG/DL (ref 120–199)
CHOLEST/HDLC SERPL: 2.9 {RATIO} (ref 2–5)
CO2 SERPL-SCNC: 24 MMOL/L (ref 23–29)
CREAT SERPL-MCNC: 0.7 MG/DL (ref 0.5–1.4)
DIFFERENTIAL METHOD: ABNORMAL
EOSINOPHIL # BLD AUTO: 0.2 K/UL (ref 0–0.5)
EOSINOPHIL NFR BLD: 2.5 % (ref 0–8)
ERYTHROCYTE [DISTWIDTH] IN BLOOD BY AUTOMATED COUNT: 13.1 % (ref 11.5–14.5)
EST. GFR  (AFRICAN AMERICAN): >60 ML/MIN/1.73 M^2
EST. GFR  (NON AFRICAN AMERICAN): >60 ML/MIN/1.73 M^2
ESTIMATED AVG GLUCOSE: 114 MG/DL (ref 68–131)
GLUCOSE SERPL-MCNC: 101 MG/DL (ref 70–110)
HBA1C MFR BLD: 5.6 % (ref 4.5–6.2)
HCT VFR BLD AUTO: 33.3 % (ref 37–48.5)
HDLC SERPL-MCNC: 33 MG/DL (ref 40–75)
HDLC SERPL: 34.7 % (ref 20–50)
HGB BLD-MCNC: 10.7 G/DL (ref 12–16)
IMM GRANULOCYTES # BLD AUTO: 0.05 K/UL (ref 0–0.04)
IMM GRANULOCYTES NFR BLD AUTO: 0.7 % (ref 0–0.5)
LDLC SERPL CALC-MCNC: 44 MG/DL (ref 63–159)
LYMPHOCYTES # BLD AUTO: 1.4 K/UL (ref 1–4.8)
LYMPHOCYTES NFR BLD: 18.9 % (ref 18–48)
MCH RBC QN AUTO: 29.1 PG (ref 27–31)
MCHC RBC AUTO-ENTMCNC: 32.1 G/DL (ref 32–36)
MCV RBC AUTO: 91 FL (ref 82–98)
MONOCYTES # BLD AUTO: 0.6 K/UL (ref 0.3–1)
MONOCYTES NFR BLD: 8.6 % (ref 4–15)
NEUTROPHILS # BLD AUTO: 5.1 K/UL (ref 1.8–7.7)
NEUTROPHILS NFR BLD: 68.5 % (ref 38–73)
NONHDLC SERPL-MCNC: 62 MG/DL
NRBC BLD-RTO: 0 /100 WBC
PLATELET # BLD AUTO: 340 K/UL (ref 150–450)
PMV BLD AUTO: 9.3 FL (ref 9.2–12.9)
POTASSIUM SERPL-SCNC: 3.3 MMOL/L (ref 3.5–5.1)
PROT SERPL-MCNC: 7.6 G/DL (ref 6–8.4)
RBC # BLD AUTO: 3.68 M/UL (ref 4–5.4)
SODIUM SERPL-SCNC: 137 MMOL/L (ref 136–145)
TRIGL SERPL-MCNC: 90 MG/DL (ref 30–150)
TSH SERPL DL<=0.005 MIU/L-ACNC: 0.88 UIU/ML (ref 0.34–5.6)
WBC # BLD AUTO: 7.48 K/UL (ref 3.9–12.7)

## 2022-06-16 PROCEDURE — 99214 OFFICE O/P EST MOD 30 MIN: CPT | Mod: S$GLB,,, | Performed by: PHYSICIAN ASSISTANT

## 2022-06-16 PROCEDURE — 36415 COLL VENOUS BLD VENIPUNCTURE: CPT | Performed by: INTERNAL MEDICINE

## 2022-06-16 PROCEDURE — 80053 COMPREHEN METABOLIC PANEL: CPT | Performed by: INTERNAL MEDICINE

## 2022-06-16 PROCEDURE — 84443 ASSAY THYROID STIM HORMONE: CPT | Performed by: INTERNAL MEDICINE

## 2022-06-16 PROCEDURE — 80061 LIPID PANEL: CPT | Performed by: INTERNAL MEDICINE

## 2022-06-16 PROCEDURE — 83036 HEMOGLOBIN GLYCOSYLATED A1C: CPT | Performed by: INTERNAL MEDICINE

## 2022-06-16 PROCEDURE — 85025 COMPLETE CBC W/AUTO DIFF WBC: CPT | Performed by: INTERNAL MEDICINE

## 2022-06-16 PROCEDURE — 99214 PR OFFICE/OUTPT VISIT, EST, LEVL IV, 30-39 MIN: ICD-10-PCS | Mod: S$GLB,,, | Performed by: PHYSICIAN ASSISTANT

## 2022-06-16 NOTE — TELEPHONE ENCOUNTER
----- Message from Janet Luque sent at 6/16/2022  2:40 PM CDT -----  Patient Call Back    Who Called: Usha/ Marine Beal    What is the request in detail: Usha is calling to speak with someone regarding the pt CT scan that was ordered, but the pt had a CT scan in the ER on 6/11. Pls advise.     Can the clinic reply by [facility]?    Best Call Back Number: [phone]

## 2022-06-16 NOTE — PROGRESS NOTES
History & Physical    SUBJECTIVE:     History of Present Illness:  Patient is a 77 y.o. female presents for hospital follow up. Recent hospitalization for perforated sigmoid diverticulitis with associated abscess. She underwent CT guided abscess drainage with pigtail catheter placement. She was discharged with drain and PICC line for IV antibiotics (zosyn). She returned to the ER on 6/11 complains of drainage from the drain site. Repeat CT showed no change in the abscess. They were instructed on how to flush the drain twice daily. She reports that sicne then, the drainage has begun to clear somewhat. She denies abdominal pain, fever, chills, nausea, etc.     Chief Complaint   Patient presents with    Post-op Evaluation       Review of patient's allergies indicates:  No Known Allergies    Current Outpatient Medications   Medication Sig Dispense Refill    amLODIPine (NORVASC) 5 MG tablet Take 5 mg by mouth once daily.      aspirin (ECOTRIN) 81 MG EC tablet Take 81 mg by mouth once daily.      b complex vitamins tablet Take 1 tablet by mouth once daily.      carvedilol (COREG) 6.25 MG tablet Take 6.25 mg by mouth 2 (two) times daily.      cholecalciferol, vitamin D3, 125 mcg (5,000 unit) Tab Take 5,000 Units by mouth once daily.      ciprofloxacin HCl (CIPRO) 500 MG tablet Take 500 mg by mouth 2 (two) times daily.      CRESTOR 10 mg tablet Take 10 mg by mouth once daily.      ezetimibe (ZETIA) 10 mg tablet Take 10 mg by mouth once daily.      fluconazole (DIFLUCAN) 200 MG Tab Take 1 tablet (200 mg total) by mouth once daily. 30 tablet 0    fluticasone propionate (FLONASE) 50 mcg/actuation nasal spray 1-2 sprays by Each Nostril route once daily.      gabapentin (NEURONTIN) 100 MG capsule Take 1 capsule (100 mg total) by mouth 3 (three) times daily. 90 capsule 5    olmesartan-hydrochlorothiazide (BENICAR HCT) 20-12.5 mg per tablet Take 1 tablet by mouth once daily.      pantoprazole (PROTONIX) 40 MG tablet  Take 40 mg by mouth once daily.      piperacillin sodium/tazobactam (PIPERACILLIN-TAZOBACTAM 3.375G/50ML D5W, READY TO MIX,) Inject 50 mLs (3.375 g total) into the vein every 8 (eight) hours.      pulse oximeter (PULSE OXIMETER) device Use twice daily at 8 AM and 3 PM and record the value in [employer] as directed. 1 each 0    ticagrelor (BRILINTA) 60 mg tablet Take 60 mg by mouth 2 (two) times daily.      tiZANidine 2 mg Cap TAKE 1 TO 2 CAPSULES BY MOUTH EVERY NIGHT AT BEDTIME AS NEEDED (Patient taking differently: Take 1-2 capsules by mouth nightly as needed. TAKE 1 TO 2 CAPSULES BY MOUTH EVERY NIGHT AT BEDTIME AS NEEDED) 60 capsule 5    zinc sulfate (ZINC-15 ORAL) Take 1 tablet by mouth once daily.       Current Facility-Administered Medications   Medication Dose Route Frequency Provider Last Rate Last Admin    acetaminophen tablet 650 mg  650 mg Oral Once PRN Danny Beltran MD        albuterol inhaler 2 puff  2 puff Inhalation Q20 Min PRN Danny Beltran MD        EPINEPHrine 0.1 mg/mL injection 0.3 mg  0.3 mg Intramuscular PRN Danny Beltran MD        ondansetron disintegrating tablet 4 mg  4 mg Oral Once PRN Danny Beltran MD           Past Medical History:   Diagnosis Date    Diverticulitis of colon with perforation 04/05/2022    History of heart artery stent 9/6/2018    History of heart attack 9/6/2018    Hypertension     Intra-abdominal abscess 05/31/2022    post diverticulitis    Multiple sclerosis     Neurogenic bladder 6/24/2015     Past Surgical History:   Procedure Laterality Date    BRAIN BIOPSY      2009    HYSTERECTOMY       Family History   Problem Relation Age of Onset    Hypertension Mother      Social History     Tobacco Use    Smoking status: Never Smoker    Smokeless tobacco: Never Used   Substance Use Topics    Alcohol use: No        Review of Systems:  Review of Systems   Constitutional: Negative for activity change, appetite change, chills, fever and  "unexpected weight change.   Eyes: Negative for visual disturbance.   Gastrointestinal: Negative for abdominal distention, abdominal pain, constipation, diarrhea, nausea and vomiting.   Genitourinary: Negative for difficulty urinating.   Musculoskeletal: Negative for arthralgias.   Skin: Negative for wound.   Hematological: Does not bruise/bleed easily.       OBJECTIVE:     Vital Signs (Most Recent)  Temp: 97.3 °F (36.3 °C) (06/16/22 0912)  Pulse: 82 (06/16/22 0912)  BP: (!) 153/76 (06/16/22 0912)  5' 5" (1.651 m)  68.9 kg (151 lb 14.4 oz)     Physical Exam:  Physical Exam  Constitutional:       General: She is not in acute distress.     Appearance: Normal appearance.   HENT:      Ears:      Comments: Hard of hearing  Eyes:      Extraocular Movements: Extraocular movements intact.   Cardiovascular:      Rate and Rhythm: Normal rate.   Pulmonary:      Effort: Pulmonary effort is normal. No respiratory distress.   Abdominal:      General: Abdomen is flat. There is no distension.      Palpations: Abdomen is soft. There is no mass.      Tenderness: There is no abdominal tenderness. There is no guarding.      Comments: Suprapubic drain noted. Mild drainage from site. There is purulent, drainage in RADHAMES bulb but also some clear drainage.    Musculoskeletal:      Cervical back: Normal range of motion.   Skin:     General: Skin is warm and dry.   Neurological:      Mental Status: She is alert and oriented to person, place, and time.         Laboratory  CBC: Reviewed  CMP: Reviewed  cultures reviewed:    Susceptibility      Pseudomonas aeruginosa    Antibiotic Interpretation Value  Comment    Ciprofloxacin Sensitive <=1 mcg/mL     Cefepime Sensitive 8 mcg/mL     Gentamicin Intermediate 8 mcg/mL     Levofloxacin Sensitive <=2 mcg/mL     Meropenem Sensitive <=1 mcg/mL     Piperacillin/Tazo Sensitive <=16 mcg/mL     Tobramycin Sensitive             Diagnostic Results:  CT: Reviewed  minimal change in abscess    ASSESSMENT/PLAN: "     Sigmoid diverticulitis with abscess, concern for fistula. S/p CT guided catheter placement. She is doing well  Will repeat CT next week with oral contrast, then follow up with Dr Mott for further planning. Expect surgery to be arranged for first week of July. Recommend continue antibiotics. She has a follow up with GI and with ID within next two weeks.

## 2022-06-23 ENCOUNTER — OFFICE VISIT (OUTPATIENT)
Dept: INFECTIOUS DISEASES | Facility: CLINIC | Age: 77
End: 2022-06-23
Payer: MEDICARE

## 2022-06-23 VITALS
OXYGEN SATURATION: 99 % | TEMPERATURE: 98 F | SYSTOLIC BLOOD PRESSURE: 140 MMHG | DIASTOLIC BLOOD PRESSURE: 58 MMHG | HEIGHT: 65 IN | HEART RATE: 78 BPM | WEIGHT: 150 LBS | BODY MASS INDEX: 24.99 KG/M2

## 2022-06-23 DIAGNOSIS — K65.1 INTRA-ABDOMINAL ABSCESS: Primary | ICD-10-CM

## 2022-06-23 DIAGNOSIS — K57.92 DIVERTICULITIS: ICD-10-CM

## 2022-06-23 DIAGNOSIS — Z79.2 ENCOUNTER FOR LONG-TERM (CURRENT) USE OF ANTIBIOTICS: ICD-10-CM

## 2022-06-23 PROCEDURE — 99214 OFFICE O/P EST MOD 30 MIN: CPT | Mod: S$GLB,,, | Performed by: INTERNAL MEDICINE

## 2022-06-23 PROCEDURE — 99214 PR OFFICE/OUTPT VISIT, EST, LEVL IV, 30-39 MIN: ICD-10-PCS | Mod: S$GLB,,, | Performed by: INTERNAL MEDICINE

## 2022-06-23 NOTE — PATIENT INSTRUCTIONS
Continue the zosyn into the first week of july  Please resume taking fluconazole 200 mg daily    Please take a probiotic while you are on antibiotics plus a few weeks. Examples of probiotics include brand names such as Align, Floraster and Culturelle, but generic versions are ok too. Kefir is a milk product that is also an excellent probiotic and can be taken as 1/4 cup three times a day with meals.      Return on July 6th

## 2022-06-23 NOTE — PROGRESS NOTES
Subjective:       Patient ID: Tayla Ewing is a 77 y.o. female.    Chief Complaint:: hospital follow up visit     HPI  Seen at [facility] for diverticular abscess which was a recurrence or relapse of prior perforation. She had been treated with a course of IV antibiotics at home but had recurrence. She had percutaneous drain placed and culture grew Hemophilus and pseudomonas. She was discharged with drain and on zosyn IV. She had a repeat CT on 6/11 which was about the same. She was seen by general surgery and another CT is planned for 6/29 and then colonoscopy and surgery in the first week of July. She is having no side effects of the antibiotics. She has purulent and foul smelling drainage in the RADHAMES. Her CRP is normal. She has no fever, a good appetite and no pneumaturia or colovaginal fistula. She is ambulatory and doing well.     Review of patient's allergies indicates:  No Known Allergies  Past Medical History:   Diagnosis Date    Diverticulitis of colon with perforation 04/05/2022    History of heart artery stent 9/6/2018    History of heart attack 9/6/2018    Hypertension     Intra-abdominal abscess 05/31/2022    post diverticulitis    Multiple sclerosis     Neurogenic bladder 6/24/2015     Past Surgical History:   Procedure Laterality Date    BRAIN BIOPSY      2009    HYSTERECTOMY       Social History     Tobacco Use    Smoking status: Never Smoker    Smokeless tobacco: Never Used   Substance Use Topics    Alcohol use: No        Family History   Problem Relation Age of Onset    Hypertension Mother          Review of Systems    Constitutional: No fever, chills, sweats, fatigue, weakness, weight loss    Eyes: No change in vision, loss of vision or diplopia, photophobia    ENT: No sore throat, mouth pains, or lesions    Cardiovascular: No chest pain, WEISS, or pedal edema    Respiratory: No shortness of breath, WEISS, cough, wheeze, sputum, or hemoptysis    Gastrointestinal: No abdominal pain, nausea,  "vomiting, diarrhea, constipation,      Genitourinary: No dysuria, hematuria,   Musculoskeletal: No new pain, joint swelling, or injuries    Integumentary: No new rashes, skin lesions, or wounds    Neurological: No dizziness, vertigo, unusual headaches, neuropathy, loss of vision, falls    Psychiatric: No anxiety, depression    Endocrine: no diabetes    Lymphatic: No lymphadenopathy, blood loss,   malignancy    VAD:     Objective:      Blood pressure (!) 140/58, pulse 78, temperature 97.5 °F (36.4 °C), height 5' 5" (1.651 m), weight 68 kg (150 lb), SpO2 99 %. Body mass index is 24.96 kg/m².  Physical Exam      General: Alert and attentive, cooperative and in no distress    Eyes: Pupils equal, round, reactive to light, anicteric, EOMI    Neck: Supple, non-tender, no thyromegaly or masses    ENT: EAC patent, TM normal, nares patent, no oral lesions, teeth in good condition, no thrush. Hard of hearing    Cardiovascular: Regular rate and rhythm, no murmurs, rubs, or gallop    Respiratory: Lungs clear without wheezes, rales, rubs or rhonci    Gastrointestinal:  Soft, bowel sounds normal,  no mass or organomegaly, no tenderness or distention. RADHAMES drain with cloudy, brown drainage. No abd wall cellulitis    Genitourinary: no flank tenderness    Integumentary: Skin without rashes, lesions, or wounds   Vascular: No peripheral edema or phlebitis, warm and well perfused    Musculoskeletal: Ambulates without difficulty, no acute arthritis, synovitis or myositis. Normal muscle bulk and strength    Lymphatic: No cervical, axillary,  LAD    Neurological: Normal LOC, cranial nerves, speech, reflexes, gait    Psychiatric: Normal mood, speech,  demeanor     Wound:    VAD: picc line without redness, tenderness, phlebitis       Recent Diagnostics:   Weekly lab       Assessment and Plan:           Intra-abdominal abscess    Diverticulitis    Encounter for long-term (current) use of antibiotics  -     SUBSEQUENT HOME HEALTH " ORDERS    Continue the zosyn into the first week of july  Please resume taking fluconazole 200 mg daily    Please take a probiotic while you are on antibiotics plus a few weeks. Examples of probiotics include brand names such as Align, Floraster and Culturelle, but generic versions are ok too. Kefir is a milk product that is also an excellent probiotic and can be taken as 1/4 cup three times a day with meals.      Return on July 6th      This note was created using Dragon voice recognition software that occasionally misinterpreted phrases or words.

## 2022-06-24 ENCOUNTER — PATIENT MESSAGE (OUTPATIENT)
Dept: PSYCHIATRY | Facility: CLINIC | Age: 77
End: 2022-06-24
Payer: MEDICARE

## 2022-06-29 ENCOUNTER — HOSPITAL ENCOUNTER (OUTPATIENT)
Dept: RADIOLOGY | Facility: HOSPITAL | Age: 77
Discharge: HOME | End: 2022-06-29
Attending: PHYSICIAN ASSISTANT
Payer: MEDICARE

## 2022-06-29 DIAGNOSIS — K57.20 COLONIC DIVERTICULAR ABSCESS: ICD-10-CM

## 2022-06-29 DIAGNOSIS — K57.32 SIGMOID DIVERTICULITIS: ICD-10-CM

## 2022-06-29 PROCEDURE — 25500020 PHARM REV CODE 255: Mod: PO | Performed by: PHYSICIAN ASSISTANT

## 2022-06-29 PROCEDURE — 74177 CT ABD & PELVIS W/CONTRAST: CPT | Mod: TC,PO

## 2022-06-29 RX ADMIN — IOHEXOL 100 ML: 350 INJECTION, SOLUTION INTRAVENOUS at 02:06

## 2022-06-30 ENCOUNTER — PATIENT MESSAGE (OUTPATIENT)
Dept: INFECTIOUS DISEASES | Facility: CLINIC | Age: 77
End: 2022-06-30

## 2022-06-30 ENCOUNTER — TELEPHONE (OUTPATIENT)
Dept: SURGERY | Facility: CLINIC | Age: 77
End: 2022-06-30
Payer: MEDICARE

## 2022-06-30 NOTE — TELEPHONE ENCOUNTER
----- Message from Carolyne Quintero sent at 6/30/2022 11:02 AM CDT -----  Contact: Patient's huasband  Type: Patient Call Back         Who called:          What is the request in detail: patient underwent CT guided abscess drainage with pigtail catheter placement; states something is not going right with the solution that was given to  to put to clean wound is coming out from under bandage; state it's not going in; requesting immediate call back from staff; will be connecting patient with on call nurse; please advise           Best call back number: [phone]         Additional Information:   [id number] - [place] - [address] AT [facility] [place] [place] [address]  [address] [place] [address]  Phone: [phone] Fax: [phone]           Thank You

## 2022-06-30 NOTE — TELEPHONE ENCOUNTER
----- Message from Emilee Pruett sent at 6/30/2022  8:10 AM CDT -----  Regarding: pt called  Name of Who is Calling: FABIANA ERICKSON [[id number]] lisa ( )      What is the request in detail: he went flush a port and it is now leaking under his wife bandage and would like to speak with a nurse. Please advise       Can the clinic reply by [facility]: No      What Number to Call Back if not in [facility]: [phone]

## 2022-06-30 NOTE — TELEPHONE ENCOUNTER
Attempted to contacted patient to schedule an appointment to come in this afternoon.  Left a message to call back.

## 2022-07-01 ENCOUNTER — HOSPITAL ENCOUNTER (EMERGENCY)
Facility: HOSPITAL | Age: 77
Discharge: HOME | End: 2022-07-01
Attending: EMERGENCY MEDICINE
Payer: MEDICARE

## 2022-07-01 ENCOUNTER — PATIENT MESSAGE (OUTPATIENT)
Dept: SURGERY | Facility: CLINIC | Age: 77
End: 2022-07-01
Payer: MEDICARE

## 2022-07-01 ENCOUNTER — DOCUMENTATION ONLY (OUTPATIENT)
Dept: PULMONOLOGY | Facility: CLINIC | Age: 77
End: 2022-07-01

## 2022-07-01 VITALS
BODY MASS INDEX: 26.58 KG/M2 | HEIGHT: 63 IN | WEIGHT: 150 LBS | OXYGEN SATURATION: 98 % | RESPIRATION RATE: 16 BRPM | HEART RATE: 62 BPM | TEMPERATURE: 98 F | SYSTOLIC BLOOD PRESSURE: 148 MMHG | DIASTOLIC BLOOD PRESSURE: 62 MMHG

## 2022-07-01 DIAGNOSIS — Z51.89 WOUND CHECK, ABSCESS: Primary | ICD-10-CM

## 2022-07-01 LAB
ALBUMIN SERPL BCP-MCNC: 3.9 G/DL (ref 3.5–5.2)
ALP SERPL-CCNC: 73 U/L (ref 55–135)
ALT SERPL W/O P-5'-P-CCNC: 18 U/L (ref 10–44)
ANION GAP SERPL CALC-SCNC: 8 MMOL/L (ref 8–16)
AST SERPL-CCNC: 15 U/L (ref 10–40)
BASOPHILS # BLD AUTO: 0.08 K/UL (ref 0–0.2)
BASOPHILS NFR BLD: 0.9 % (ref 0–1.9)
BILIRUB SERPL-MCNC: 0.5 MG/DL (ref 0.1–1)
BUN SERPL-MCNC: 18 MG/DL (ref 8–23)
CALCIUM SERPL-MCNC: 9.8 MG/DL (ref 8.7–10.5)
CHLORIDE SERPL-SCNC: 106 MMOL/L (ref 95–110)
CO2 SERPL-SCNC: 23 MMOL/L (ref 23–29)
CREAT SERPL-MCNC: 0.6 MG/DL (ref 0.5–1.4)
DIFFERENTIAL METHOD: ABNORMAL
EOSINOPHIL # BLD AUTO: 0.1 K/UL (ref 0–0.5)
EOSINOPHIL NFR BLD: 1.4 % (ref 0–8)
ERYTHROCYTE [DISTWIDTH] IN BLOOD BY AUTOMATED COUNT: 14.6 % (ref 11.5–14.5)
EST. GFR  (AFRICAN AMERICAN): >60 ML/MIN/1.73 M^2
EST. GFR  (NON AFRICAN AMERICAN): >60 ML/MIN/1.73 M^2
GLUCOSE SERPL-MCNC: 103 MG/DL (ref 70–110)
HCT VFR BLD AUTO: 34.8 % (ref 37–48.5)
HGB BLD-MCNC: 11.2 G/DL (ref 12–16)
IMM GRANULOCYTES # BLD AUTO: 0.03 K/UL (ref 0–0.04)
IMM GRANULOCYTES NFR BLD AUTO: 0.3 % (ref 0–0.5)
LYMPHOCYTES # BLD AUTO: 2.3 K/UL (ref 1–4.8)
LYMPHOCYTES NFR BLD: 24.7 % (ref 18–48)
MAGNESIUM SERPL-MCNC: 1.9 MG/DL (ref 1.6–2.6)
MCH RBC QN AUTO: 29.3 PG (ref 27–31)
MCHC RBC AUTO-ENTMCNC: 32.2 G/DL (ref 32–36)
MCV RBC AUTO: 91 FL (ref 82–98)
MONOCYTES # BLD AUTO: 0.9 K/UL (ref 0.3–1)
MONOCYTES NFR BLD: 9.2 % (ref 4–15)
NEUTROPHILS # BLD AUTO: 5.9 K/UL (ref 1.8–7.7)
NEUTROPHILS NFR BLD: 63.5 % (ref 38–73)
NRBC BLD-RTO: 0 /100 WBC
PLATELET # BLD AUTO: 277 K/UL (ref 150–450)
PMV BLD AUTO: 9.4 FL (ref 9.2–12.9)
POTASSIUM SERPL-SCNC: 4 MMOL/L (ref 3.5–5.1)
PROT SERPL-MCNC: 7.8 G/DL (ref 6–8.4)
RBC # BLD AUTO: 3.82 M/UL (ref 4–5.4)
SODIUM SERPL-SCNC: 137 MMOL/L (ref 136–145)
WBC # BLD AUTO: 9.24 K/UL (ref 3.9–12.7)

## 2022-07-01 PROCEDURE — 80053 COMPREHEN METABOLIC PANEL: CPT | Performed by: STUDENT IN AN ORGANIZED HEALTH CARE EDUCATION/TRAINING PROGRAM

## 2022-07-01 PROCEDURE — 85025 COMPLETE CBC W/AUTO DIFF WBC: CPT | Performed by: STUDENT IN AN ORGANIZED HEALTH CARE EDUCATION/TRAINING PROGRAM

## 2022-07-01 PROCEDURE — 25000003 PHARM REV CODE 250: Performed by: STUDENT IN AN ORGANIZED HEALTH CARE EDUCATION/TRAINING PROGRAM

## 2022-07-01 PROCEDURE — 83735 ASSAY OF MAGNESIUM: CPT | Performed by: STUDENT IN AN ORGANIZED HEALTH CARE EDUCATION/TRAINING PROGRAM

## 2022-07-01 PROCEDURE — 63600175 PHARM REV CODE 636 W HCPCS: Performed by: STUDENT IN AN ORGANIZED HEALTH CARE EDUCATION/TRAINING PROGRAM

## 2022-07-01 PROCEDURE — 96375 TX/PRO/DX INJ NEW DRUG ADDON: CPT

## 2022-07-01 PROCEDURE — 63600175 PHARM REV CODE 636 W HCPCS: Performed by: EMERGENCY MEDICINE

## 2022-07-01 PROCEDURE — 96365 THER/PROPH/DIAG IV INF INIT: CPT | Mod: 59

## 2022-07-01 PROCEDURE — 99285 EMERGENCY DEPT VISIT HI MDM: CPT | Mod: 25

## 2022-07-01 PROCEDURE — 25500020 PHARM REV CODE 255: Performed by: EMERGENCY MEDICINE

## 2022-07-01 RX ORDER — HEPARIN SODIUM,PORCINE/PF 10 UNIT/ML
50 SYRINGE (ML) INTRAVENOUS
Status: DISCONTINUED | OUTPATIENT
Start: 2022-07-01 | End: 2022-07-02 | Stop reason: HOSPADM

## 2022-07-01 RX ORDER — HEPARIN SODIUM,PORCINE/PF 10 UNIT/ML
10 SYRINGE (ML) INTRAVENOUS
Status: DISCONTINUED | OUTPATIENT
Start: 2022-07-01 | End: 2022-07-01

## 2022-07-01 RX ADMIN — Medication 50 UNITS: at 10:07

## 2022-07-01 RX ADMIN — IOHEXOL 100 ML: 350 INJECTION, SOLUTION INTRAVENOUS at 07:07

## 2022-07-01 RX ADMIN — PIPERACILLIN SODIUM,TAZOBACTAM SODIUM 3.38 G: 3; .375 INJECTION, POWDER, FOR SOLUTION INTRAVENOUS at 09:07

## 2022-07-01 NOTE — TELEPHONE ENCOUNTER
Portal message :  EOC abx                                 CT results                                 Follow up appt with you                                 On  7/06/22 @ 10 am

## 2022-07-01 NOTE — ED PROVIDER NOTES
Encounter Date: 7/1/2022       History     Chief Complaint   Patient presents with    Wound Check     Abdominal abscess drain leaking around incision site. Wound drain placed 3 weeks ago.      Tayla Ewing is a 77 y.o. female past medical history of diverticulitis of colon perforation, intra-abdominal abscess, multiple sclerosis and hypertension presents with drainage around percutaneous drain in the abdomen.  Percutaneous drain was placed about 3 weeks ago for a intra-abdominal abscess and patient started on daily IV antibiotics at home.  Patient reports she feels fine overall, denies any fever, headache, chest pain, chills, nausea, vomiting, diarrhea, constipation, rashes.  Patient does report increased drainage around the site, with decreased drainage within the drain.  Per chart review patient had a repeat abdominal CT done 2 days ago which showed improvement of the intra-abdominal abscess.  And percutaneous drain was in a good location.        Review of patient's allergies indicates:  No Known Allergies  Past Medical History:   Diagnosis Date    Diverticulitis of colon with perforation 04/05/2022    History of heart artery stent 9/6/2018    History of heart attack 9/6/2018    Hypertension     Intra-abdominal abscess 05/31/2022    post diverticulitis    Multiple sclerosis     Neurogenic bladder 6/24/2015     Past Surgical History:   Procedure Laterality Date    BRAIN BIOPSY      2009    HYSTERECTOMY       Family History   Problem Relation Age of Onset    Hypertension Mother      Social History     Tobacco Use    Smoking status: Never Smoker    Smokeless tobacco: Never Used   Substance Use Topics    Alcohol use: No     Review of Systems   Constitutional: Negative for chills, fatigue and fever.   HENT: Negative for congestion and sore throat.    Respiratory: Negative for shortness of breath.    Cardiovascular: Negative for chest pain and palpitations.   Gastrointestinal: Positive for abdominal  pain. Negative for diarrhea, nausea and vomiting.   Genitourinary: Negative for dysuria, flank pain and frequency.   Skin: Positive for wound. Negative for color change, pallor and rash.   Neurological: Negative for weakness and headaches.       Physical Exam     Initial Vitals [07/01/22 1557]   BP Pulse Resp Temp SpO2   (!) 145/88 89 18 98.2 °F (36.8 °C) 98 %      MAP       --         Physical Exam    Nursing note and vitals reviewed.  Constitutional: She appears well-developed and well-nourished.   HENT:   Head: Normocephalic and atraumatic.   Cardiovascular: Normal rate, regular rhythm and normal heart sounds.   Pulmonary/Chest: Breath sounds normal. No respiratory distress.   Abdominal: Abdomen is soft. Bowel sounds are normal. She exhibits no distension. There is abdominal tenderness (Around percutaneous drain site, suprapubic). There is no rebound and no guarding.   Musculoskeletal:         General: Normal range of motion.     Neurological: She is alert and oriented to person, place, and time. GCS score is 15. GCS eye subscore is 4. GCS verbal subscore is 5. GCS motor subscore is 6.   Skin: Skin is warm. Capillary refill takes less than 2 seconds.   The drainage noted around suprapubic percutaneous drain, no surround erythema or warmth of skin noted.   Psychiatric: She has a normal mood and affect. Her behavior is normal. Judgment and thought content normal.         ED Course   Procedures  Labs Reviewed   CBC W/ AUTO DIFFERENTIAL - Abnormal; Notable for the following components:       Result Value    RBC 3.82 (*)     Hemoglobin 11.2 (*)     Hematocrit 34.8 (*)     RDW 14.6 (*)     All other components within normal limits   COMPREHENSIVE METABOLIC PANEL   MAGNESIUM          Imaging Results          CT Abdomen Pelvis With Contrast (Final result)  Result time 07/01/22 19:45:25    Final result by Bruce Taylor MD (07/01/22 19:45:25)                 Narrative:    CT ABDOMEN AND PELVIS WITH  CONTRAST    HISTORY: Abdominal abscess, concerns for blockage or movement of percutaneous drain        CMS MANDATED QUALITY DATA - CT RADIATION  436    All CT scans at this facility utilize dose modulation, iterative reconstruction, and/or weight based dosing when appropriate to reduce radiation dose to as low as reasonably achievable    FINDINGS: Post infusion images were obtained from the lung bases to the pubic symphysis. Comparison is made to June 29.    The lung bases are normal.    The liver has a normal appearance. The gallbladder and biliary tree are unremarkable. The spleen, pancreas, and adrenal glands are within normal limits. The left kidney has a normal appearance. Lower pole right renal cyst is unchanged. There is dense aortic atherosclerosis without aneurysm.    Scattered colonic diverticula are noted. Bowel structures in the upper abdomen are otherwise within normal limits.    Images of the lower abdomen and pelvis demonstrate percutaneous abscess drain unchanged in position, with pigtail within a small residual abscess closely adjacent to the bladder dome. The small residual collection measures approximately 2.3 cm in transverse diameter and is stable in appearance when compared to June 29. Again noted is probable communication with a diverticulum of the proximal sigmoid colon. There is a second collection also immediately adjacent to the dome of the urinary bladder posteriorly to the right of midline. This second collection measures 2.9 cm in transverse diameter, slightly increased in size compared to the prior study where it measured 2.3 cm. The second collection is likely not amenable to percutaneous aspiration, and does not definitively communicate with the aforementioned abscess.    Sigmoid diverticulosis is noted. There is a small amount of pelvic free fluid, unchanged.    There is no definite evidence of fistulous communication with the urinary bladder. 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    IMPRESSION:  1. Pelvic percutaneous abscess drain remains appropriately positioned. Small fluid collection drained by the abscess is stable in appearance when compared to June 29, with additional details as above.  2. There is a second rim-enhancing air-fluid collection immediately adjacent to the dome of the bladder at its posterior aspect to of the right of midline also consistent with abscess. This is slightly larger compared to June 29 (2.3 cm, increasing to 2.9 cm. There is no definite communication with the original abscess, and this second collection is probably not amenable to percutaneous aspiration.  3. Additional stable findings as above.    Electronically signed by:  Bruce Taylor MD  7/1/2022 7:45 PM CDT Workstation: [id number]                               Medications   iohexoL (OMNIPAQUE 350) injection 100 mL (100 mLs Intravenous Given 7/1/22 1915)   piperacillin-tazobactam 3.375 g in dextrose 5 % 50 mL IVPB (ready to mix system) (0 g Intravenous Stopped 7/1/22 2141)     Medical Decision Making:   Differential Diagnosis:   Abscess, malposition of the tubing, cellulitis  Clinical Tests:   Lab Tests: Reviewed and Ordered  Radiological Study: Reviewed  ED Management:  Tayla wEing is a 77 y.o. female who presents with a apparently drainage noted around percutaneous drain in suprapubic region x1 day.  She denies any fever or chills.  Patient had a repeat abdominal CT performed 2 days ago which showed decrease of size of abscess and drain in good location. Labs were ordered and patient does not have a elevated WBC. ABD/pevlis ct was repeated which showed drain in good location but a second collection not amenable to percutaneous aspiration has increased in size. On call surgeon was consulted which reccommended patient to be discharged and to follow up as outpatient given that patient is well appearing, afebrile, and no elevated WBC. Patient given zosyn in  the ED. Patient and  feel comfortable going home and changing the dressing daily. They will follow up with their surgeon early next week.             Attending Attestation:   Physician Attestation Statement for Resident:  As the supervising MD   Physician Attestation Statement: I have personally seen and examined this patient.   I agree with the above history. -: Patient with history of diverticular abscess.  Patient with percutaneous drain in place.  Patient reports purulent leakage around drain.  Patient continues with purulent drainage and RADHAMES drain as well.  There is no fever chills.  No real abdominal pain.  Patient continues to have IV antibiotics at home.   As the supervising MD I agree with the above PE.   -: Patient alert.  Abdomen is soft and essentially nontender.  Patient does have suprapubic drain with slight purulent material to abdominal wall and purulent material in RADHAMES drain.   As the supervising MD I agree with the above treatment, course, plan, and disposition.  I have reviewed and agree with the residents interpretation of the following: lab data and CT scans.  I have reviewed the following: old records at this facility.                ED Course as of 07/02/22 0136   Fri Jul 01, 2022   1843 Hemoglobin(!): 11.2 [MR]   1843 Hematocrit(!): 34.8 [MR]   1843 WBC: 9.24 [MR]      ED Course User Index  [MR] Anuradha Yepez MD             Clinical Impression:   Final diagnoses:  [Z51.89] Wound check, abscess (Primary)          ED Disposition Condition    Discharge Stable        ED Prescriptions     None        Follow-up Information     Follow up With Specialties Details Why Contact Info Additional Information    Marcel Mueller MD Family Medicine  If symptoms worsen, fever, chills, nausea, vomiting [address]  [address]  [facility] [address]  [phone]       Critical access hospital - Emergency Dept Emergency Medicine   [address] [place] [address] [id number] [phone] [facility]  floor           Anuradha Yepez MD  Resident  07/02/22 0142       Fareed Quinones MD  07/02/22 0977

## 2022-07-01 NOTE — PROGRESS NOTES
I spoke with  Mani ( [facility]) and Alea ( nurse   With [facility] )  To advise both to extend IV abx through 7/06/22   Per Dr Craig's orders.     7/01/22  [facility]

## 2022-07-02 NOTE — DISCHARGE INSTRUCTIONS
Please follow up with your surgeon on Tuesday for evaluation. If you develop fever, chills, nausea, vomiting, or worsening pain, those are reasons to return to the emergency department.

## 2022-07-06 ENCOUNTER — OFFICE VISIT (OUTPATIENT)
Dept: INFECTIOUS DISEASES | Facility: CLINIC | Age: 77
End: 2022-07-06
Payer: MEDICARE

## 2022-07-06 VITALS
HEIGHT: 63 IN | HEART RATE: 76 BPM | DIASTOLIC BLOOD PRESSURE: 68 MMHG | SYSTOLIC BLOOD PRESSURE: 132 MMHG | OXYGEN SATURATION: 99 % | BODY MASS INDEX: 27.04 KG/M2 | WEIGHT: 152.63 LBS | TEMPERATURE: 98 F

## 2022-07-06 DIAGNOSIS — K57.92 DIVERTICULITIS: ICD-10-CM

## 2022-07-06 DIAGNOSIS — K65.1 INTRA-ABDOMINAL ABSCESS: Primary | ICD-10-CM

## 2022-07-06 DIAGNOSIS — Z79.2 ENCOUNTER FOR LONG-TERM (CURRENT) USE OF ANTIBIOTICS: ICD-10-CM

## 2022-07-06 PROCEDURE — 99214 OFFICE O/P EST MOD 30 MIN: CPT | Mod: S$GLB,,, | Performed by: INTERNAL MEDICINE

## 2022-07-06 PROCEDURE — 99214 PR OFFICE/OUTPT VISIT, EST, LEVL IV, 30-39 MIN: ICD-10-PCS | Mod: S$GLB,,, | Performed by: INTERNAL MEDICINE

## 2022-07-06 NOTE — PATIENT INSTRUCTIONS
We will continue to add days to the zosyn until the time of surgery  Continue the diflucan    I will get in touch with Dr. Mott

## 2022-07-07 ENCOUNTER — OFFICE VISIT (OUTPATIENT)
Dept: SURGERY | Facility: CLINIC | Age: 77
End: 2022-07-07
Payer: MEDICARE

## 2022-07-07 ENCOUNTER — DOCUMENTATION ONLY (OUTPATIENT)
Dept: INFECTIOUS DISEASES | Facility: CLINIC | Age: 77
End: 2022-07-07

## 2022-07-07 VITALS — DIASTOLIC BLOOD PRESSURE: 77 MMHG | TEMPERATURE: 97 F | SYSTOLIC BLOOD PRESSURE: 133 MMHG | HEART RATE: 86 BPM

## 2022-07-07 DIAGNOSIS — K57.80 DIVERTICULAR DISEASE OF INTESTINE WITH PERFORATION AND ABSCESS: Primary | ICD-10-CM

## 2022-07-07 DIAGNOSIS — N83.8 OVARIAN MASS, LEFT: ICD-10-CM

## 2022-07-07 DIAGNOSIS — K57.30 DIVERTICULAR DISEASE OF LARGE INTESTINE WITH COMPLICATION: ICD-10-CM

## 2022-07-07 PROCEDURE — 99214 PR OFFICE/OUTPT VISIT, EST, LEVL IV, 30-39 MIN: ICD-10-PCS | Mod: S$GLB,,, | Performed by: SURGERY

## 2022-07-07 PROCEDURE — 99214 OFFICE O/P EST MOD 30 MIN: CPT | Mod: S$GLB,,, | Performed by: SURGERY

## 2022-07-07 NOTE — PROGRESS NOTES
"Subjective:       Patient ID: Tayla Ewing is a 77 y.o. female.    Chief Complaint:: Follow-up    HPI  Seen at [facility] for diverticular abscess which was a recurrence or relapse of prior perforation. She had been treated with a course of IV antibiotics at home but had recurrence. She had percutaneous drain placed and culture grew Hemophilus and pseudomonas. She was discharged with drain and on zosyn IV. She had a repeat CT on 6/11 which was about the same. She was seen by general surgery and another CT is planned for 6/29 and then colonoscopy and surgery in the first week of July. She is having no side effects of the antibiotics. She has purulent and foul smelling drainage in the RADHAMES. Her CRP is normal. She has no fever, a good appetite and no pneumaturia or colovaginal fistula. She is ambulatory and doing well.     7/6/22: since last visit, had CT scan showing reduction in the size of th eprimary abscess but evolution of an abscess near the dome of the bladder. The surgery has not been scheduled, though they were told that "a place would be held" for this week. She is seeing Surgery PA tomorrow, but after this visit I did speak with Dr. Mott and he will see her tomorrow to schedule the surgery. She is tolerating the antibiotics well, no diarrhea, thrush, or rash. No picc problems. She has purulent drainage around the drain.     Review of patient's allergies indicates:  No Known Allergies  Past Medical History:   Diagnosis Date    Diverticulitis of colon with perforation 04/05/2022    History of heart artery stent 9/6/2018    History of heart attack 9/6/2018    Hypertension     Intra-abdominal abscess 05/31/2022    post diverticulitis    Multiple sclerosis     Neurogenic bladder 6/24/2015     Past Surgical History:   Procedure Laterality Date    BRAIN BIOPSY      2009    HYSTERECTOMY       Social History     Tobacco Use    Smoking status: Never Smoker    Smokeless tobacco: Never Used   Substance Use " "Topics    Alcohol use: No        Family History   Problem Relation Age of Onset    Hypertension Mother          Review of Systems    Constitutional: No fever, chills, sweats, fatigue, weakness, weight loss    Eyes: No change in vision, loss of vision      ENT: No sore throat, mouth pains, or lesions    Cardiovascular: No chest pain, WEISS, or pedal edema    Respiratory: No shortness of breath, WEISS, cough,     Gastrointestinal: No abdominal pain, nausea, vomiting, diarrhea, constipation,      Genitourinary: No dysuria, hematuria,   Musculoskeletal: No new pain, joint swelling, or injuries    Integumentary: No new rashes, skin lesions, or wounds    Neurological: No dizziness, vertigo, unusual headaches, neuropathy, loss of vision, falls    Psychiatric: No anxiety, depression    Endocrine: no diabetes    Lymphatic: No lymphadenopathy, blood loss,   malignancy    VAD: no problems with picc    Objective:      Blood pressure 132/68, pulse 76, temperature 97.7 °F (36.5 °C), height 5' 3" (1.6 m), weight 69.2 kg (152 lb 9.6 oz), SpO2 99 %. Body mass index is 27.03 kg/m².  Physical Exam      General: Alert and attentive, cooperative and in no distress    Eyes: Pupils equal, round, reactive to light, anicteric, EOMI    Neck: Supple, non-tender, no thyromegaly or masses    ENT: EAC patent, TM normal, nares patent, no oral lesions, teeth in good condition, no thrush. Hard of hearing    Cardiovascular: Regular rate and rhythm, no murmurs, rubs, or gallop    Respiratory: Lungs clear without wheezes, rales, rubs or rhonci    Gastrointestinal:  Soft, bowel sounds normal,  no mass or organomegaly, no tenderness or distention. RADHAMES drain with cloudy, brown drainage. No abd wall cellulitis. There is foul smelling pus coming from around the percutaneous drain    Genitourinary: no flank tenderness    Integumentary: Skin without rashes, lesions, or wounds   Vascular: No peripheral edema or phlebitis, warm and well " perfused    Musculoskeletal: Ambulates without difficulty, no acute arthritis, synovitis or myositis. Normal muscle bulk and strength    Lymphatic: No cervical, axillary,  LAD    Neurological: Normal LOC, cranial nerves, speech,  , gait    Psychiatric: Normal mood, speech,  demeanor     Wound:    VAD: picc line without redness, tenderness, phlebitis       Recent Diagnostics:   Weekly lab       Assessment and Plan:           Encounter for long-term (current) use of antibiotics  -     SUBSEQUENT HOME HEALTH ORDERS    Intra-abdominal abscess  -     SUBSEQUENT HOME HEALTH ORDERS    We will continue to add days to the zosyn until the time of surgery  Continue the diflucan    I will get in touch with Dr. Mott  I would be happy to see her in the hospital. I would give meropenem as her surgical prophylaxis and after surgery considering the long term exposure to zosyn      This note was created using Dragon voice recognition software that occasionally misinterpreted phrases or words.

## 2022-07-07 NOTE — H&P (VIEW-ONLY)
Subjective:       Patient ID: Tayla Ewing is a 77 y.o. female.    Chief Complaint: Other (Remove drain)      HPI:  Patient returns for follow up. She had CT scan that showed stable abscess with new fluid collection adjacent to the bladder. She has no new abdominal pain. She has not had any fever or chills. Still producing purulent RADHAMES drainage. Still having small amount of drainage from around the tube as well. 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 She has history of vaginal hysterectomy thirty years ago.     Past Medical History:   Diagnosis Date    Diverticulitis of colon with perforation 04/05/2022    History of heart artery stent 9/6/2018    History of heart attack 9/6/2018    Hypertension     Intra-abdominal abscess 05/31/2022    post diverticulitis    Multiple sclerosis     Neurogenic bladder 6/24/2015     Past Surgical History:   Procedure Laterality Date    BRAIN BIOPSY      2009    HYSTERECTOMY       Review of patient's allergies indicates:  No Known Allergies  Medication List with Changes/Refills   Current Medications    AMLODIPINE (NORVASC) 5 MG TABLET    Take 5 mg by mouth once daily.    ASPIRIN (ECOTRIN) 81 MG EC TABLET    Take 81 mg by mouth once daily.    B COMPLEX VITAMINS TABLET    Take 1 tablet by mouth once daily.    CARVEDILOL (COREG) 6.25 MG TABLET    Take 6.25 mg by mouth 2 (two) times daily.    CHOLECALCIFEROL, VITAMIN D3, 125 MCG (5,000 UNIT) TAB    Take 5,000 Units by mouth once daily.    CIPROFLOXACIN HCL (CIPRO) 500 MG TABLET    Take 500 mg by mouth 2 (two) times daily.    CRESTOR 10 MG TABLET    Take 10 mg by mouth once daily.    EZETIMIBE (ZETIA) 10 MG TABLET    Take 10 mg by mouth once daily.    FLUCONAZOLE (DIFLUCAN) 200 MG TAB    Take 1 tablet (200 mg total) by mouth once daily.    FLUTICASONE PROPIONATE (FLONASE) 50 MCG/ACTUATION NASAL SPRAY    1-2 sprays by Each Nostril route once daily.    GABAPENTIN (NEURONTIN) 100 MG CAPSULE     Take 1 capsule (100 mg total) by mouth 3 (three) times daily.    OLMESARTAN-HYDROCHLOROTHIAZIDE (BENICAR HCT) 20-12.5 MG PER TABLET    Take 1 tablet by mouth once daily.    PANTOPRAZOLE (PROTONIX) 40 MG TABLET    Take 40 mg by mouth once daily.    PIPERACILLIN SODIUM/TAZOBACTAM (PIPERACILLIN-TAZOBACTAM 3.375G/50ML D5W, READY TO MIX,)    Inject 50 mLs (3.375 g total) into the vein every 8 (eight) hours.    PULSE OXIMETER (PULSE OXIMETER) DEVICE    Use twice daily at 8 AM and 3 PM and record the value in MyChart as directed.    TICAGRELOR (BRILINTA) 60 MG TABLET    Take 60 mg by mouth 2 (two) times daily.    TIZANIDINE 2 MG CAP    TAKE 1 TO 2 CAPSULES BY MOUTH EVERY NIGHT AT BEDTIME AS NEEDED    ZINC SULFATE (ZINC-15 ORAL)    Take 1 tablet by mouth once daily.     Family History   Problem Relation Age of Onset    Hypertension Mother      Social History     Socioeconomic History    Marital status:    Tobacco Use    Smoking status: Never Smoker    Smokeless tobacco: Never Used   Substance and Sexual Activity    Alcohol use: No         Review of Systems    Objective:      Physical Exam  Constitutional:       General: She is not in acute distress.     Appearance: Normal appearance.   HENT:      Head: Normocephalic and atraumatic.      Ears:      Comments: Hard of hearing  Eyes:      Extraocular Movements: Extraocular movements intact.   Cardiovascular:      Rate and Rhythm: Normal rate.   Pulmonary:      Effort: Pulmonary effort is normal. No tachypnea, bradypnea or respiratory distress.   Abdominal:      General: Abdomen is flat. There is no distension.      Palpations: Abdomen is soft. There is no mass.      Tenderness: There is no abdominal tenderness. There is no guarding.      Comments: Suprapubic drain noted. Mild drainage from site. There is purulent, drainage in RADHAMES bulb  abdomen is non tender and non distended.    Musculoskeletal:      Cervical back: Normal range of motion and neck supple.   Skin:      General: Skin is warm and dry.   Neurological:      Mental Status: She is alert and oriented to person, place, and time.         Assessment/Plan:   Tayla was seen today for other.    Diagnoses and all orders for this visit:    Diverticular disease of intestine with perforation and abscess  -     Case Request Operating Room: COLECTOMY, SIGMOID, LAPAROSCOPIC, SALPINGO-OOPHORECTOMY, LAPAROSCOPIC, CYSTOSCOPY, WITH RETROGRADE PYELOGRAM AND URETERAL STENT INSERTION, INCISION AND DRAINAGE, ABSCESS  -     Basic metabolic panel; Future  -     CBC auto differential; Future  -     EKG 12-lead; Future  -     X-Ray Chest PA And Lateral; Future    Diverticular disease of large intestine with complication    Ovarian mass, left    Other orders  -     Full code; Standing  -     Vital signs; Standing  -     Insert peripheral IV; Standing  -     Diet NPO; Standing  -     IP VTE LOW RISK PATIENT; Standing  -     Place sequential compression device; Standing  -     ceFAZolin (ANCEF) 2 g in dextrose 5 % 50 mL IVPB  -     Pulse Oximetry Q4H; Standing  -     Place in Outpatient; Standing  -     Full code  -     Insert peripheral IV      Will leave drain in place. Plan for surgery July 15. Anticipated operation will be a sigmoidectomy with or without ostomy, drainage of the pelvic abscesses. IR drain will be removed at time of surgery. Will ask urology to place ureteral stents. She also has a 4 - 5 cm let ovarian mass that appears to be a teratoma on CT. Will ask GYN's advice on excision. This would be a good opportunity to excise it.     I discussed the proposed procedures with the patient including risks, benefits, indications, alternatives and special concerns.  The patient appears to understand and agrees to go ahead with surgery.  I have made no promises, warranties or verbal agreements beyond what was discussed above.    No follow-ups on file.

## 2022-07-07 NOTE — PROGRESS NOTES
Mani with [employer] called and stated patient called him  Stating she is having surgery on Monday 7/11/22 and her   Zosyn is to be extended until then.    I advised Mani to extend IV Zosyn until surgery ; per   Dr Craig's orders ( in note of 7/6/22)     [facility]  7/07/22

## 2022-07-15 ENCOUNTER — ANESTHESIA EVENT (OUTPATIENT)
Dept: SURGERY | Facility: HOSPITAL | Age: 77
DRG: 329 | End: 2022-07-15
Payer: MEDICARE

## 2022-07-15 ENCOUNTER — ANESTHESIA (OUTPATIENT)
Dept: SURGERY | Facility: HOSPITAL | Age: 77
DRG: 329 | End: 2022-07-15
Payer: MEDICARE

## 2022-07-15 ENCOUNTER — HOSPITAL ENCOUNTER (INPATIENT)
Facility: HOSPITAL | Age: 77
LOS: 5 days | Discharge: HOME | DRG: 329 | End: 2022-07-20
Attending: SURGERY | Admitting: INTERNAL MEDICINE
Payer: MEDICARE

## 2022-07-15 ENCOUNTER — EXTERNAL HOME HEALTH (OUTPATIENT)
Dept: HOME HEALTH SERVICES | Facility: HOSPITAL | Age: 77
End: 2022-07-15
Payer: MEDICARE

## 2022-07-15 DIAGNOSIS — K57.30 DIVERTICULAR DISEASE OF LARGE INTESTINE WITH COMPLICATION: ICD-10-CM

## 2022-07-15 DIAGNOSIS — K57.80 DIVERTICULAR DISEASE OF INTESTINE WITH PERFORATION AND ABSCESS: ICD-10-CM

## 2022-07-15 DIAGNOSIS — N83.8 OVARIAN MASS, LEFT: ICD-10-CM

## 2022-07-15 DIAGNOSIS — R07.9 CHEST PAIN: ICD-10-CM

## 2022-07-15 DIAGNOSIS — U07.1 COVID-19 VIRUS INFECTION: ICD-10-CM

## 2022-07-15 DIAGNOSIS — K65.1 INTRA-ABDOMINAL ABSCESS: ICD-10-CM

## 2022-07-15 DIAGNOSIS — Z79.2 ENCOUNTER FOR LONG-TERM (CURRENT) USE OF ANTIBIOTICS: ICD-10-CM

## 2022-07-15 DIAGNOSIS — Z01.818 PREOP TESTING: Primary | ICD-10-CM

## 2022-07-15 PROBLEM — I10 HTN (HYPERTENSION): Status: ACTIVE | Noted: 2022-07-15

## 2022-07-15 LAB
ALBUMIN SERPL BCP-MCNC: 3.1 G/DL (ref 3.5–5.2)
ALP SERPL-CCNC: 61 U/L (ref 55–135)
ALT SERPL W/O P-5'-P-CCNC: 18 U/L (ref 10–44)
ANION GAP SERPL CALC-SCNC: 9 MMOL/L (ref 8–16)
AST SERPL-CCNC: 15 U/L (ref 10–40)
BASOPHILS # BLD AUTO: 0.03 K/UL (ref 0–0.2)
BASOPHILS NFR BLD: 0.2 % (ref 0–1.9)
BILIRUB SERPL-MCNC: 0.9 MG/DL (ref 0.1–1)
BUN SERPL-MCNC: 8 MG/DL (ref 8–23)
BUN SERPL-MCNC: 9 MG/DL (ref 8–23)
CALCIUM SERPL-MCNC: 8.7 MG/DL (ref 8.7–10.5)
CHLORIDE SERPL-SCNC: 106 MMOL/L (ref 95–110)
CO2 SERPL-SCNC: 22 MMOL/L (ref 23–29)
CREAT SERPL-MCNC: 0.6 MG/DL (ref 0.5–1.4)
CREAT SERPL-MCNC: 0.6 MG/DL (ref 0.5–1.4)
DIFFERENTIAL METHOD: ABNORMAL
EOSINOPHIL # BLD AUTO: 0 K/UL (ref 0–0.5)
EOSINOPHIL NFR BLD: 0 % (ref 0–8)
ERYTHROCYTE [DISTWIDTH] IN BLOOD BY AUTOMATED COUNT: 14.6 % (ref 11.5–14.5)
EST. GFR  (AFRICAN AMERICAN): >60 ML/MIN/1.73 M^2
EST. GFR  (AFRICAN AMERICAN): >60 ML/MIN/1.73 M^2
EST. GFR  (NON AFRICAN AMERICAN): >60 ML/MIN/1.73 M^2
EST. GFR  (NON AFRICAN AMERICAN): >60 ML/MIN/1.73 M^2
GLUCOSE SERPL-MCNC: 115 MG/DL (ref 70–110)
HCT VFR BLD AUTO: 31.2 % (ref 37–48.5)
HGB BLD-MCNC: 10.4 G/DL (ref 12–16)
IMM GRANULOCYTES # BLD AUTO: 0.08 K/UL (ref 0–0.04)
IMM GRANULOCYTES NFR BLD AUTO: 0.6 % (ref 0–0.5)
INR PPP: 1.2
LACTATE SERPL-SCNC: 1.2 MMOL/L (ref 0.5–1.9)
LYMPHOCYTES # BLD AUTO: 1.4 K/UL (ref 1–4.8)
LYMPHOCYTES NFR BLD: 9.5 % (ref 18–48)
MCH RBC QN AUTO: 30.2 PG (ref 27–31)
MCHC RBC AUTO-ENTMCNC: 33.3 G/DL (ref 32–36)
MCV RBC AUTO: 91 FL (ref 82–98)
MONOCYTES # BLD AUTO: 0.5 K/UL (ref 0.3–1)
MONOCYTES NFR BLD: 3.8 % (ref 4–15)
NEUTROPHILS # BLD AUTO: 12.3 K/UL (ref 1.8–7.7)
NEUTROPHILS NFR BLD: 85.9 % (ref 38–73)
NRBC BLD-RTO: 0 /100 WBC
PLATELET # BLD AUTO: 234 K/UL (ref 150–450)
PMV BLD AUTO: 8.9 FL (ref 9.2–12.9)
POTASSIUM SERPL-SCNC: 3.3 MMOL/L (ref 3.5–5.1)
PROCALCITONIN SERPL IA-MCNC: 0.99 NG/ML (ref 0–0.5)
PROT SERPL-MCNC: 6.3 G/DL (ref 6–8.4)
PROTHROMBIN TIME: 14.7 SEC (ref 11.4–13.7)
RBC # BLD AUTO: 3.44 M/UL (ref 4–5.4)
SARS-COV-2 RDRP RESP QL NAA+PROBE: NEGATIVE
SODIUM SERPL-SCNC: 137 MMOL/L (ref 136–145)
WBC # BLD AUTO: 14.26 K/UL (ref 3.9–12.7)

## 2022-07-15 PROCEDURE — 93005 ELECTROCARDIOGRAM TRACING: CPT

## 2022-07-15 PROCEDURE — 36000711: Performed by: SURGERY

## 2022-07-15 PROCEDURE — 99900035 HC TECH TIME PER 15 MIN (STAT)

## 2022-07-15 PROCEDURE — 25000003 PHARM REV CODE 250: Performed by: NURSE ANESTHETIST, CERTIFIED REGISTERED

## 2022-07-15 PROCEDURE — 71000033 HC RECOVERY, INTIAL HOUR: Performed by: SURGERY

## 2022-07-15 PROCEDURE — 36000710: Performed by: SURGERY

## 2022-07-15 PROCEDURE — 63600175 PHARM REV CODE 636 W HCPCS: Performed by: ANESTHESIOLOGY

## 2022-07-15 PROCEDURE — 63600175 PHARM REV CODE 636 W HCPCS: Performed by: NURSE ANESTHETIST, CERTIFIED REGISTERED

## 2022-07-15 PROCEDURE — C1769 GUIDE WIRE: HCPCS | Performed by: SURGERY

## 2022-07-15 PROCEDURE — 27201423 OPTIME MED/SURG SUP & DEVICES STERILE SUPPLY: Performed by: SURGERY

## 2022-07-15 PROCEDURE — 99900031 HC PATIENT EDUCATION (STAT)

## 2022-07-15 PROCEDURE — 27000080 OPTIME MED/SURG SUP & DEVICES GENERAL CLASSIFICATION: Performed by: SURGERY

## 2022-07-15 PROCEDURE — 80053 COMPREHEN METABOLIC PANEL: CPT | Performed by: FAMILY MEDICINE

## 2022-07-15 PROCEDURE — 84520 ASSAY OF UREA NITROGEN: CPT | Performed by: SURGERY

## 2022-07-15 PROCEDURE — 25000003 PHARM REV CODE 250: Performed by: SURGERY

## 2022-07-15 PROCEDURE — 88307 TISSUE EXAM BY PATHOLOGIST: CPT | Mod: TC

## 2022-07-15 PROCEDURE — 36415 COLL VENOUS BLD VENIPUNCTURE: CPT | Performed by: SURGERY

## 2022-07-15 PROCEDURE — 37000009 HC ANESTHESIA EA ADD 15 MINS: Performed by: SURGERY

## 2022-07-15 PROCEDURE — U0002 COVID-19 LAB TEST NON-CDC: HCPCS | Performed by: SURGERY

## 2022-07-15 PROCEDURE — 27000221 HC OXYGEN, UP TO 24 HOURS

## 2022-07-15 PROCEDURE — 82565 ASSAY OF CREATININE: CPT | Performed by: SURGERY

## 2022-07-15 PROCEDURE — 87040 BLOOD CULTURE FOR BACTERIA: CPT | Mod: 59 | Performed by: FAMILY MEDICINE

## 2022-07-15 PROCEDURE — 85610 PROTHROMBIN TIME: CPT | Performed by: FAMILY MEDICINE

## 2022-07-15 PROCEDURE — 44204 PR LAP,SURG,COLECTOMY, PARTIAL, W/ANAST: ICD-10-PCS | Mod: ,,, | Performed by: SURGERY

## 2022-07-15 PROCEDURE — 84145 PROCALCITONIN (PCT): CPT | Performed by: FAMILY MEDICINE

## 2022-07-15 PROCEDURE — 94761 N-INVAS EAR/PLS OXIMETRY MLT: CPT

## 2022-07-15 PROCEDURE — 85025 COMPLETE CBC W/AUTO DIFF WBC: CPT | Performed by: FAMILY MEDICINE

## 2022-07-15 PROCEDURE — 58661 LAPAROSCOPY REMOVE ADNEXA: CPT | Mod: 51,LT,, | Performed by: SURGERY

## 2022-07-15 PROCEDURE — 58661 PR LAP,RMV  ADNEXAL STRUCTURE: ICD-10-PCS | Mod: 51,LT,, | Performed by: SURGERY

## 2022-07-15 PROCEDURE — 25000003 PHARM REV CODE 250: Performed by: FAMILY MEDICINE

## 2022-07-15 PROCEDURE — 71000039 HC RECOVERY, EACH ADD'L HOUR: Performed by: SURGERY

## 2022-07-15 PROCEDURE — 83036 HEMOGLOBIN GLYCOSYLATED A1C: CPT | Performed by: FAMILY MEDICINE

## 2022-07-15 PROCEDURE — 12000002 HC ACUTE/MED SURGE SEMI-PRIVATE ROOM

## 2022-07-15 PROCEDURE — 83605 ASSAY OF LACTIC ACID: CPT | Performed by: FAMILY MEDICINE

## 2022-07-15 PROCEDURE — 44204 LAPARO PARTIAL COLECTOMY: CPT | Mod: ,,, | Performed by: SURGERY

## 2022-07-15 PROCEDURE — 37000008 HC ANESTHESIA 1ST 15 MINUTES: Performed by: SURGERY

## 2022-07-15 PROCEDURE — 63600175 PHARM REV CODE 636 W HCPCS: Performed by: SURGERY

## 2022-07-15 RX ORDER — TALC
6 POWDER (GRAM) TOPICAL NIGHTLY PRN
Status: DISCONTINUED | OUTPATIENT
Start: 2022-07-15 | End: 2022-07-20 | Stop reason: HOSPADM

## 2022-07-15 RX ORDER — AMLODIPINE BESYLATE 5 MG/1
5 TABLET ORAL DAILY
Status: DISCONTINUED | OUTPATIENT
Start: 2022-07-16 | End: 2022-07-15

## 2022-07-15 RX ORDER — PHENYLEPHRINE HYDROCHLORIDE 10 MG/ML
INJECTION INTRAVENOUS
Status: DISCONTINUED | OUTPATIENT
Start: 2022-07-15 | End: 2022-07-15

## 2022-07-15 RX ORDER — CARVEDILOL 3.12 MG/1
3.12 TABLET ORAL 2 TIMES DAILY
Status: DISCONTINUED | OUTPATIENT
Start: 2022-07-15 | End: 2022-07-20 | Stop reason: HOSPADM

## 2022-07-15 RX ORDER — SODIUM CHLORIDE 0.9 % (FLUSH) 0.9 %
10 SYRINGE (ML) INJECTION
Status: DISCONTINUED | OUTPATIENT
Start: 2022-07-15 | End: 2022-07-19

## 2022-07-15 RX ORDER — MEROPENEM AND SODIUM CHLORIDE 500 MG/50ML
500 INJECTION, SOLUTION INTRAVENOUS
Status: DISCONTINUED | OUTPATIENT
Start: 2022-07-15 | End: 2022-07-15

## 2022-07-15 RX ORDER — GABAPENTIN 100 MG/1
100 CAPSULE ORAL 3 TIMES DAILY
Status: DISCONTINUED | OUTPATIENT
Start: 2022-07-15 | End: 2022-07-15

## 2022-07-15 RX ORDER — SODIUM CHLORIDE 0.9 % (FLUSH) 0.9 %
10 SYRINGE (ML) INJECTION
Status: DISCONTINUED | OUTPATIENT
Start: 2022-07-15 | End: 2022-07-20 | Stop reason: HOSPADM

## 2022-07-15 RX ORDER — ONDANSETRON 2 MG/ML
4 INJECTION INTRAMUSCULAR; INTRAVENOUS EVERY 6 HOURS PRN
Status: DISCONTINUED | OUTPATIENT
Start: 2022-07-15 | End: 2022-07-20 | Stop reason: HOSPADM

## 2022-07-15 RX ORDER — DIPHENHYDRAMINE HYDROCHLORIDE 50 MG/ML
12.5 INJECTION INTRAMUSCULAR; INTRAVENOUS
Status: DISCONTINUED | OUTPATIENT
Start: 2022-07-15 | End: 2022-07-15 | Stop reason: HOSPADM

## 2022-07-15 RX ORDER — DIPHENHYDRAMINE HYDROCHLORIDE 50 MG/ML
12.5 INJECTION INTRAMUSCULAR; INTRAVENOUS EVERY 4 HOURS PRN
Status: DISCONTINUED | OUTPATIENT
Start: 2022-07-15 | End: 2022-07-20 | Stop reason: HOSPADM

## 2022-07-15 RX ORDER — MEROPENEM AND SODIUM CHLORIDE 500 MG/50ML
500 INJECTION, SOLUTION INTRAVENOUS
Status: DISCONTINUED | OUTPATIENT
Start: 2022-07-16 | End: 2022-07-16

## 2022-07-15 RX ORDER — NALOXONE HCL 0.4 MG/ML
0.4 VIAL (ML) INJECTION
Status: DISCONTINUED | OUTPATIENT
Start: 2022-07-15 | End: 2022-07-20 | Stop reason: HOSPADM

## 2022-07-15 RX ORDER — SODIUM CHLORIDE 9 MG/ML
INJECTION, SOLUTION INTRAVENOUS CONTINUOUS
Status: DISCONTINUED | OUTPATIENT
Start: 2022-07-15 | End: 2022-07-20 | Stop reason: HOSPADM

## 2022-07-15 RX ORDER — ONDANSETRON 2 MG/ML
4 INJECTION INTRAMUSCULAR; INTRAVENOUS DAILY PRN
Status: DISCONTINUED | OUTPATIENT
Start: 2022-07-15 | End: 2022-07-15 | Stop reason: HOSPADM

## 2022-07-15 RX ORDER — CEFAZOLIN SODIUM 2 G/50ML
2 SOLUTION INTRAVENOUS
Status: DISCONTINUED | OUTPATIENT
Start: 2022-07-15 | End: 2022-07-15 | Stop reason: HOSPADM

## 2022-07-15 RX ORDER — CARVEDILOL 6.25 MG/1
6.25 TABLET ORAL 2 TIMES DAILY
Status: DISCONTINUED | OUTPATIENT
Start: 2022-07-15 | End: 2022-07-15

## 2022-07-15 RX ORDER — ONDANSETRON 2 MG/ML
INJECTION INTRAMUSCULAR; INTRAVENOUS
Status: DISCONTINUED | OUTPATIENT
Start: 2022-07-15 | End: 2022-07-15

## 2022-07-15 RX ORDER — FAMOTIDINE 10 MG/ML
INJECTION INTRAVENOUS
Status: DISCONTINUED | OUTPATIENT
Start: 2022-07-15 | End: 2022-07-15

## 2022-07-15 RX ORDER — PANTOPRAZOLE SODIUM 40 MG/1
40 TABLET, DELAYED RELEASE ORAL DAILY
Status: DISCONTINUED | OUTPATIENT
Start: 2022-07-15 | End: 2022-07-20 | Stop reason: HOSPADM

## 2022-07-15 RX ORDER — HYDROMORPHONE HCL IN 0.9% NACL 6 MG/30 ML
PATIENT CONTROLLED ANALGESIA SYRINGE INTRAVENOUS CONTINUOUS
Status: DISCONTINUED | OUTPATIENT
Start: 2022-07-15 | End: 2022-07-17

## 2022-07-15 RX ORDER — MEROPENEM AND SODIUM CHLORIDE 500 MG/50ML
500 INJECTION, SOLUTION INTRAVENOUS ONCE
Status: COMPLETED | OUTPATIENT
Start: 2022-07-15 | End: 2022-07-15

## 2022-07-15 RX ORDER — MUPIROCIN 20 MG/G
1 OINTMENT TOPICAL 2 TIMES DAILY
Status: DISCONTINUED | OUTPATIENT
Start: 2022-07-15 | End: 2022-07-20 | Stop reason: HOSPADM

## 2022-07-15 RX ORDER — HYDROCHLOROTHIAZIDE 12.5 MG/1
12.5 TABLET ORAL DAILY
Status: DISCONTINUED | OUTPATIENT
Start: 2022-07-16 | End: 2022-07-15

## 2022-07-15 RX ORDER — HYDRALAZINE HYDROCHLORIDE 20 MG/ML
5 INJECTION INTRAMUSCULAR; INTRAVENOUS EVERY 4 HOURS PRN
Status: DISCONTINUED | OUTPATIENT
Start: 2022-07-15 | End: 2022-07-20 | Stop reason: HOSPADM

## 2022-07-15 RX ORDER — KETAMINE HYDROCHLORIDE 50 MG/ML
INJECTION, SOLUTION INTRAMUSCULAR; INTRAVENOUS
Status: DISCONTINUED | OUTPATIENT
Start: 2022-07-15 | End: 2022-07-15

## 2022-07-15 RX ORDER — ONDANSETRON 4 MG/1
8 TABLET, ORALLY DISINTEGRATING ORAL EVERY 6 HOURS PRN
Status: DISCONTINUED | OUTPATIENT
Start: 2022-07-15 | End: 2022-07-15

## 2022-07-15 RX ORDER — SODIUM CHLORIDE 0.9 % (FLUSH) 0.9 %
10 SYRINGE (ML) INJECTION
Status: DISCONTINUED | OUTPATIENT
Start: 2022-07-15 | End: 2022-07-15 | Stop reason: HOSPADM

## 2022-07-15 RX ORDER — LIDOCAINE HCL/PF 100 MG/5ML
SYRINGE (ML) INTRAVENOUS
Status: DISCONTINUED | OUTPATIENT
Start: 2022-07-15 | End: 2022-07-15

## 2022-07-15 RX ORDER — ROCURONIUM BROMIDE 10 MG/ML
INJECTION, SOLUTION INTRAVENOUS
Status: DISCONTINUED | OUTPATIENT
Start: 2022-07-15 | End: 2022-07-15

## 2022-07-15 RX ORDER — ACETAMINOPHEN 325 MG/1
650 TABLET ORAL EVERY 8 HOURS PRN
Status: DISCONTINUED | OUTPATIENT
Start: 2022-07-15 | End: 2022-07-15

## 2022-07-15 RX ORDER — HYDRALAZINE HYDROCHLORIDE 20 MG/ML
10 INJECTION INTRAMUSCULAR; INTRAVENOUS EVERY 4 HOURS PRN
Status: DISCONTINUED | OUTPATIENT
Start: 2022-07-15 | End: 2022-07-20 | Stop reason: HOSPADM

## 2022-07-15 RX ORDER — LIDOCAINE HYDROCHLORIDE 10 MG/ML
1 INJECTION, SOLUTION EPIDURAL; INFILTRATION; INTRACAUDAL; PERINEURAL ONCE
Status: DISCONTINUED | OUTPATIENT
Start: 2022-07-15 | End: 2022-07-20 | Stop reason: HOSPADM

## 2022-07-15 RX ORDER — FENTANYL CITRATE 50 UG/ML
INJECTION, SOLUTION INTRAMUSCULAR; INTRAVENOUS
Status: DISCONTINUED | OUTPATIENT
Start: 2022-07-15 | End: 2022-07-15

## 2022-07-15 RX ORDER — LOSARTAN POTASSIUM 50 MG/1
50 TABLET ORAL DAILY
Status: DISCONTINUED | OUTPATIENT
Start: 2022-07-16 | End: 2022-07-15

## 2022-07-15 RX ORDER — HYDROMORPHONE HYDROCHLORIDE 1 MG/ML
0.2 INJECTION, SOLUTION INTRAMUSCULAR; INTRAVENOUS; SUBCUTANEOUS EVERY 5 MIN PRN
Status: DISCONTINUED | OUTPATIENT
Start: 2022-07-15 | End: 2022-07-15 | Stop reason: HOSPADM

## 2022-07-15 RX ORDER — ACETAMINOPHEN 325 MG/1
650 TABLET ORAL ONCE AS NEEDED
Status: DISCONTINUED | OUTPATIENT
Start: 2022-07-15 | End: 2022-07-15

## 2022-07-15 RX ORDER — ACETAMINOPHEN 325 MG/1
650 TABLET ORAL ONCE AS NEEDED
Status: DISCONTINUED | OUTPATIENT
Start: 2022-07-15 | End: 2022-07-18

## 2022-07-15 RX ORDER — IPRATROPIUM BROMIDE AND ALBUTEROL SULFATE 2.5; .5 MG/3ML; MG/3ML
3 SOLUTION RESPIRATORY (INHALATION) EVERY 4 HOURS PRN
Status: DISCONTINUED | OUTPATIENT
Start: 2022-07-15 | End: 2022-07-20 | Stop reason: HOSPADM

## 2022-07-15 RX ORDER — EZETIMIBE 10 MG/1
10 TABLET ORAL DAILY
Status: DISCONTINUED | OUTPATIENT
Start: 2022-07-15 | End: 2022-07-15

## 2022-07-15 RX ORDER — PROPOFOL 10 MG/ML
VIAL (ML) INTRAVENOUS
Status: DISCONTINUED | OUTPATIENT
Start: 2022-07-15 | End: 2022-07-15

## 2022-07-15 RX ORDER — GLUCAGON 1 MG
1 KIT INJECTION
Status: DISCONTINUED | OUTPATIENT
Start: 2022-07-15 | End: 2022-07-20 | Stop reason: HOSPADM

## 2022-07-15 RX ORDER — OXYCODONE HYDROCHLORIDE 5 MG/1
5 TABLET ORAL
Status: DISCONTINUED | OUTPATIENT
Start: 2022-07-15 | End: 2022-07-15 | Stop reason: HOSPADM

## 2022-07-15 RX ORDER — FLUTICASONE PROPIONATE 50 MCG
1 SPRAY, SUSPENSION (ML) NASAL DAILY
Status: DISCONTINUED | OUTPATIENT
Start: 2022-07-16 | End: 2022-07-20 | Stop reason: HOSPADM

## 2022-07-15 RX ORDER — MEPERIDINE HYDROCHLORIDE 50 MG/ML
12.5 INJECTION INTRAMUSCULAR; INTRAVENOUS; SUBCUTANEOUS EVERY 10 MIN PRN
Status: DISCONTINUED | OUTPATIENT
Start: 2022-07-15 | End: 2022-07-15 | Stop reason: HOSPADM

## 2022-07-15 RX ADMIN — CARVEDILOL 3.12 MG: 3.12 TABLET, FILM COATED ORAL at 09:07

## 2022-07-15 RX ADMIN — PANTOPRAZOLE SODIUM 40 MG: 40 TABLET, DELAYED RELEASE ORAL at 07:07

## 2022-07-15 RX ADMIN — ONDANSETRON 4 MG: 2 INJECTION INTRAMUSCULAR; INTRAVENOUS at 12:07

## 2022-07-15 RX ADMIN — FAMOTIDINE 20 MG: 10 INJECTION, SOLUTION INTRAVENOUS at 07:07

## 2022-07-15 RX ADMIN — SODIUM CHLORIDE, SODIUM LACTATE, POTASSIUM CHLORIDE, AND CALCIUM CHLORIDE: .6; .31; .03; .02 INJECTION, SOLUTION INTRAVENOUS at 07:07

## 2022-07-15 RX ADMIN — PROPOFOL 120 MG: 10 INJECTION, EMULSION INTRAVENOUS at 07:07

## 2022-07-15 RX ADMIN — MEROPENEM AND SODIUM CHLORIDE 500 MG: 500 INJECTION, SOLUTION INTRAVENOUS at 04:07

## 2022-07-15 RX ADMIN — KETAMINE HYDROCHLORIDE 50 MG: 50 INJECTION INTRAMUSCULAR; INTRAVENOUS at 07:07

## 2022-07-15 RX ADMIN — FENTANYL CITRATE 50 MCG: 50 INJECTION INTRAMUSCULAR; INTRAVENOUS at 11:07

## 2022-07-15 RX ADMIN — Medication: at 12:07

## 2022-07-15 RX ADMIN — ROCURONIUM BROMIDE 30 MG: 10 INJECTION, SOLUTION INTRAVENOUS at 07:07

## 2022-07-15 RX ADMIN — KETAMINE HYDROCHLORIDE 25 MG: 50 INJECTION INTRAMUSCULAR; INTRAVENOUS at 09:07

## 2022-07-15 RX ADMIN — LIDOCAINE HYDROCHLORIDE 50 MG: 20 INJECTION, SOLUTION INTRAVENOUS at 07:07

## 2022-07-15 RX ADMIN — SUGAMMADEX 200 MG: 100 INJECTION, SOLUTION INTRAVENOUS at 11:07

## 2022-07-15 RX ADMIN — KETAMINE HYDROCHLORIDE 25 MG: 50 INJECTION INTRAMUSCULAR; INTRAVENOUS at 08:07

## 2022-07-15 RX ADMIN — FENTANYL CITRATE 50 MCG: 50 INJECTION INTRAMUSCULAR; INTRAVENOUS at 10:07

## 2022-07-15 RX ADMIN — MUPIROCIN 1 G: 20 OINTMENT TOPICAL at 09:07

## 2022-07-15 RX ADMIN — MEPERIDINE HYDROCHLORIDE 12.5 MG: 50 INJECTION INTRAMUSCULAR; INTRAVENOUS; SUBCUTANEOUS at 01:07

## 2022-07-15 RX ADMIN — FENTANYL CITRATE 50 MCG: 50 INJECTION INTRAMUSCULAR; INTRAVENOUS at 08:07

## 2022-07-15 RX ADMIN — ONDANSETRON 4 MG: 2 INJECTION INTRAMUSCULAR; INTRAVENOUS at 07:07

## 2022-07-15 RX ADMIN — ROCURONIUM BROMIDE 30 MG: 10 INJECTION, SOLUTION INTRAVENOUS at 09:07

## 2022-07-15 RX ADMIN — PHENYLEPHRINE HYDROCHLORIDE 100 MCG: 10 INJECTION INTRAVENOUS at 11:07

## 2022-07-15 RX ADMIN — PHENYLEPHRINE HYDROCHLORIDE 100 MCG: 10 INJECTION INTRAVENOUS at 07:07

## 2022-07-15 RX ADMIN — SODIUM CHLORIDE: 0.9 INJECTION, SOLUTION INTRAVENOUS at 04:07

## 2022-07-15 RX ADMIN — FENTANYL CITRATE 50 MCG: 50 INJECTION INTRAMUSCULAR; INTRAVENOUS at 07:07

## 2022-07-15 RX ADMIN — EZETIMIBE 10 MG: 10 TABLET ORAL at 07:07

## 2022-07-15 NOTE — ANESTHESIA PREPROCEDURE EVALUATION
07/15/2022  Tayla Ewing is a 77 y.o., female.      Patient Active Problem List   Diagnosis    MS (multiple sclerosis)    Prophylactic immunotherapy    Muscle spasm    Counseling regarding goals of care    Neurogenic bladder    Neuropathic pain    Vitamin D insufficiency    History of heart attack    History of heart artery stent    Diverticulitis    Intra-abdominal abscess    History of diverticulitis of colon       Past Surgical History:   Procedure Laterality Date    BRAIN BIOPSY      2009    HYSTERECTOMY          Tobacco Use:  The patient  reports that she has never smoked. She has never used smokeless tobacco.     Results for orders placed or performed during the hospital encounter of 07/15/22   EKG 12-lead    Collection Time: 07/15/22  5:47 AM    Narrative    Test Reason : Z01.818,Z01.818,    Vent. Rate : 074 BPM     Atrial Rate : 074 BPM     P-R Int : 162 ms          QRS Dur : 086 ms      QT Int : 398 ms       P-R-T Axes : 051 062 043 degrees     QTc Int : 441 ms    Normal sinus rhythm  Normal ECG  When compared with ECG of 20-JAN-2021 01:47,  No significant change was found    Referred By:             Confirmed By:              Lab Results   Component Value Date    WBC 8.86 07/05/2022    HGB 11.0 (L) 07/05/2022    HCT 34.5 (L) 07/05/2022    MCV 95 07/05/2022     07/05/2022     BMP  Lab Results   Component Value Date     07/05/2022    K 4.2 07/05/2022     07/05/2022    CO2 20 (L) 07/05/2022    BUN 21 07/05/2022    CREATININE 0.8 07/05/2022    CALCIUM 9.6 07/05/2022    ANIONGAP 12 07/05/2022    GLU 89 07/05/2022     07/01/2022    GLU 94 06/28/2022       No results found for this or any previous visit.          Pre-op Assessment    I have reviewed the Patient Summary Reports.     I have reviewed the Nursing Notes. I have reviewed the NPO Status.   I have  "reviewed the Medications.     Review of Systems  Anesthesia Hx:  No problems with previous Anesthesia  Denies Family Hx of Anesthesia complications.   Denies Personal Hx of Anesthesia complications.   Social:  Non-Smoker    Hematology/Oncology:  Hematology Normal        EENT/Dental:  EENT/Dental Normal  Jaw Problems: (Jaw "cracks".  No history of dislocation)   Cardiovascular:   Hypertension Past MI (2018, had stent placement, currently of blood thinner) CAD asymptomatic     Pulmonary:  Pulmonary Normal    Renal/:  Renal/ Normal     Hepatic/GI:  Hepatic/GI Normal  Bowel Conditions:  Diverticulitis (Intra-abdominal abscess, PICC line placed for antibiotics five weeks ago)    Musculoskeletal:  Musculoskeletal Normal    Neurological:  Neurology Normal  Neuro Symptoms (MS asymptomatic , had leg weekness one time at age 65 when she got diagnosed.  She can walk without problems.)   Endocrine:  Endocrine Normal    Psych:  Psychiatric Normal       RADHAMES drain in placed, leaks.    Physical Exam  General: Well nourished    Airway:  Mallampati: III / II  Mouth Opening: Small, but > 3cm  TM Distance: Normal  Tongue: Normal  Neck ROM: Normal ROM    Dental:  Intact    Chest/Lungs:  Clear to auscultation, Normal Respiratory Rate    Heart:  Rate: Normal  Rhythm: Regular Rhythm        Anesthesia Plan  Type of Anesthesia, risks & benefits discussed:    Anesthesia Type: Gen ETT  Intra-op Monitoring Plan: Standard ASA Monitors  Post Op Pain Control Plan: IV/PO Opioids PRN, multimodal analgesia and PCA  Induction:  IV  Airway Plan: Video  Informed Consent: Informed consent signed with the Patient and all parties understand the risks and agree with anesthesia plan.  All questions answered.   ASA Score: 3  Anesthesia Plan Notes: No succinylcholine (she has MS)  GETA.  Glidescope (jaw problems)  Multimodal analgesia with ofirmev 1000 mg, decadron 8 mg, ketamine 50 mg, then 25 mg every hour.  PONV prophylaxis with Pepcid 20 mg IV, and " Zofran 4 mg IV.      Ready For Surgery From Anesthesia Perspective.     .

## 2022-07-15 NOTE — OP NOTE
Operative Note         SUMMARY     Surgery Date:  7/15/2022     Assistant:     Indications:  77-year-old female for laparoscopic colonic surgery  For diverticular abscess  Planning to place urinary stents to prevent trauma  To the urinary tract    Pre-op Diagnosis:   Colonic abscess    Post-op Diagnosis:  Same    Procedure:  Cystoscopy with bilateral placement of urinary stents    Anesthesia: General    Description of Procedure:   Patient brought to cystoscopy suite.  Placed in the cystoscopy position.  Patient is prepped and draped.  Anesthesia is given.  We enter the urinary bladder with the 21 fr cystoscope.  No lesions found within the bladder  Orifices are normal bilaterally  We easily placed glide wires into the ureters  We then float open-ended sheaths into the renal pelvises bilaterally  Lighted stents and then placed through the sheaths  Page catheter was placed we secured the stents in preparation for the laparoscopic operation    Findings/Key Components:      May remove stents in the postoperative phase    Estimated Blood Loss:      None

## 2022-07-15 NOTE — CONSULTS
77-year-old female with diverticular colonic abscess  Patient to have laparoscopic colonic surgery  General surgery team has asked me to place urinary stents to aid in the identification of the ureters  And urinary tracks  This is done to prevent major urinary trauma during the laparoscopic approach  Consents for this have been signed and will proceed with cystoscopic stent placement

## 2022-07-15 NOTE — TRANSFER OF CARE
"Anesthesia Transfer of Care Note    Patient: Tayla Ewing    Procedure(s) Performed: Procedure(s) (LRB):  COLECTOMY, SIGMOID, LAPAROSCOPIC (N/A)  SALPINGO-OOPHORECTOMY, LAPAROSCOPIC (Left)  INCISION AND DRAINAGE, ABSCESS (N/A)  CYSTOSCOPY, WITH URETERAL STENT INSERTION (N/A)    Patient location: PACU    Anesthesia Type: general    Transport from OR: Transported from OR on room air with adequate spontaneous ventilation    Post pain: adequate analgesia    Post assessment: no apparent anesthetic complications    Post vital signs: stable    Level of consciousness: awake and alert    Nausea/Vomiting: no nausea/vomiting    Complications: none    Transfer of care protocol was followed      Last vitals:   Visit Vitals  BP (!) 123/95   Pulse 67   Temp 36.3 °C (97.4 °F) (Oral)   Resp 16   Ht 5' 3" (1.6 m)   Wt 67.2 kg (148 lb 2.4 oz)   SpO2 100%   Breastfeeding No   BMI 26.24 kg/m²     "

## 2022-07-15 NOTE — OP NOTE
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 She was given general anesthesia and intubated.  Urology performed cystoscopy and placed bilateral ureteral stents.  This procedure is documented in a separate report. The abdomen was prepped and draped. A small left upper quadrant incision was made .  Abdomen was insufflated with Veress needle.  The abdomen was entered with a 5 mm Visiport.  There were no adhesions to the anterior abdominal wall. A  midline incision was made from above the umbilicus to a few cm's below the umbilicus. Dissection was carried down to the abdominal fascia. The abdomen was entered through the linea alba.  The hand port was positioned into place here at this incision.  Under direct visualization, a 5 mm port was placed in the right lower quadrant.  The camera was placed into this port. My hand was placed through the hand port.  Another 12mm port was put in the midline in a suprapubic position.  The patient was put in a slight Trendelenburg position.  The sigmoid was grasped and retracted medially.  The firm diseased segment of colon could be easily palpated in the mid sigmoid.  The previous abscess was  located anteriorly involving the mid sigmoid.  It was densely adhered to the anterior abdominal wall.  The abscess wall was broken up and the sigmoid dissected away from the anterior abdominal wall.  There was very little fluid within the abscess cavity. The distal aspect of the white line of Toldt was taken down using the Ligasure.  The colon was reflected medially.  This exposed the left ovary.  There was a large irregular mass involved with the left ovary.  A left salpingo-oophorectomy was performed using the LigaSure.  The ligamentous attachments were taken with LigaSure as well as the vessels.  The tube and ovary was removed together through the hand port.  It was sent separately for specimen.  It measured 5.5 cm.  We turned our attention back to the colon.  A hole in the mesentery was made using the Ligasure with good hemostasis. The mesentery was then divided down towards the rectum using the Ligasure with good hemostasis. Once the colon was mobilized down to the peritoneal reflection, the rectum was divided using an Endo VISHNU stapler. The colon was exteriorized through the hand port. The colon was divided at the sigmoid-descending colon junction. The specimen was passed off the field. It was labeled sigmoid.  EEA sizers were used to select the appropriate EEA stapler - 29 mm. The anvil was placed in the proximal portion of the colon and the colon closed using a VISHNU stapler.      The EEA stapler was then placed in the patient's rectum and advanced to the staple line under direct vision. The spike of the stapler was advanced through the rectum.  It was attached to the anvil in the proximal colon and closed. The stapler was fired. The stapler was removed. Both donuts were intact. The anastomosis was hemostatic with no tension. The rectum was insufflated via proctoscope under a field of irrigation fluid to test the anastomosis. The anastomosis was found to be airtight. The abdomen was then suction irrigated with  normal saline. Good hemostasis was observed.       The midline fascia was re approximated using #1 looped PDS in a running fashion from proximal and distal and tying in the middle. Skin closed with Monocryl. The wounds were cleaned and dried and sterile dressings placed.    All sponge, needle, and instrument counts were correct at the end of the case.  The patient tolerated the procedure well, was extubated in the operating room, and transported to the recovery room in stable condition.      Complications none   Estimated Blood Loss: 275 mL    Estimated Blood Loss has been documented.         Specimens:   Specimen (24h ago, onward)             Start     Ordered    07/15/22 3224  Specimen to Pathology - Surgery  Once        Comments: Pre-op Diagnosis: Diverticular disease of intestine with perforation and abscess [K57.80]Procedure(s):COLECTOMY, SIGMOID, LAPAROSCOPICSALPINGO-OOPHORECTOMY, LAPAROSCOPICINCISION AND DRAINAGE, ABSCESSCYSTOSCOPY, WITH URETERAL STENT INSERTION Number of specimens: 2Name of specimens: 1. Left Ovarian Mass with Tube and Overy2. Sigmoid Colon - for perforated diverticulitis     Question:  Release to patient  Answer:  Immediate    07/15/22 [id number]                [id number]

## 2022-07-15 NOTE — BRIEF OP NOTE
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 She was given general anesthesia and intubated.  Urology performed cystoscopy and placed bilateral ureteral stents.  This procedure is documented in a separate report. The abdomen was prepped and draped. A small left upper quadrant incision was made .  Abdomen was insufflated with Veress needle.  The abdomen was entered with a 5 mm Visiport.  There were no adhesions to the anterior abdominal wall. A  midline incision was made from above the umbilicus to a few cm's below the umbilicus. Dissection was carried down to the abdominal fascia. The abdomen was entered through the linea alba.  The hand port was positioned into place here at this incision.  Under direct visualization, a 5 mm port was placed in the right lower quadrant.  The camera was placed into this port. My hand was placed through the hand port.  Another 12mm port was put in the midline in a suprapubic position.  The patient was put in a slight Trendelenburg position.  The sigmoid was grasped and retracted medially.  The firm diseased segment of colon could be  easily palpated in the mid sigmoid.  The previous abscess was located anteriorly involving the mid sigmoid.  It was densely adhered to the anterior abdominal wall.  The abscess wall was broken up and the sigmoid dissected away from the anterior abdominal wall.  There was very little fluid within the abscess cavity. The distal aspect of the white line of Toldt was taken down using the Ligasure.  The colon was reflected medially.  This exposed the left ovary.  There was a large irregular mass involved with the left ovary.  A left salpingo-oophorectomy was performed using the LigaSure.  The ligamentous attachments were taken with LigaSure as well as the vessels.  The tube and ovary was removed together through the hand port.  It was sent separately for specimen.  It measured 5.5 cm.  We turned our attention back to the colon.  A hole in the mesentery was made using the Ligasure with good hemostasis. The mesentery was then divided down towards the rectum using the Ligasure with good hemostasis. Once the colon was mobilized down to the peritoneal reflection, the rectum was divided using an Endo VISHNU stapler. The colon was exteriorized through the hand port. The colon was divided at the sigmoid-descending colon junction. The specimen was passed off the field. It was labeled sigmoid.  EEA sizers were used to select the appropriate EEA stapler - 29 mm. The anvil was placed in the proximal portion of the colon and the colon closed using a VISHNU stapler.      The EEA stapler was then placed in the patient's rectum and advanced to the staple line under direct vision. The spike of the stapler was advanced through the rectum.  It was attached to the anvil in the proximal colon and closed. The stapler was fired. The stapler was removed. Both donuts were intact. The anastomosis was hemostatic with no tension. The rectum was insufflated via proctoscope under a field of irrigation fluid to test the anastomosis. The anastomosis was  found to be airtight. The abdomen was then suction irrigated with normal saline. Good hemostasis was observed.       The midline fascia was re approximated using #1 looped PDS in a running fashion from proximal and distal and tying in the middle. Skin closed with Monocryl. The wounds were cleaned and dried and sterile dressings placed.    All sponge, needle, and instrument counts were correct at the end of the case.  The patient tolerated the procedure well, was extubated in the operating room, and transported to the recovery room in stable condition.      Complications none   Estimated Blood Loss: 275 mL    Estimated Blood Loss has been documented.         Specimens:   Specimen (24h ago, onward)             Start     Ordered    07/15/22 9222  Specimen to Pathology - Surgery  Once        Comments: Pre-op Diagnosis: Diverticular disease of intestine with perforation and abscess [K57.80]Procedure(s):COLECTOMY, SIGMOID, LAPAROSCOPICSALPINGO-OOPHORECTOMY, LAPAROSCOPICINCISION AND DRAINAGE, ABSCESSCYSTOSCOPY, WITH URETERAL STENT INSERTION Number of specimens: 2Name of specimens: 1. Left Ovarian Mass with Tube and Overy2. Sigmoid Colon - for perforated diverticulitis     Question:  Release to patient  Answer:  Immediate    07/15/22 6385                [id number]

## 2022-07-15 NOTE — CARE UPDATE
07/15/22 1536   Patient Assessment/Suction   Level of Consciousness (AVPU) alert   Respiratory Effort Normal;Unlabored   Expansion/Accessory Muscles/Retractions expansion symmetric   Rhythm/Pattern, Respiratory unlabored   PRE-TX-O2   O2 Device (Oxygen Therapy) nasal cannula   $ Is the patient on Low Flow Oxygen? Yes   Flow (L/min) 2   SpO2 100 %   Pulse Oximetry Type Continuous  (etco2 monitor)   $ Pulse Oximetry - Multiple Charge Pulse Oximetry - Multiple   Pulse 98   Resp 20   ETCO2   $ ETCO2 Usage Currently wearing   ETCO2 (mmHg) 33 mmHg   ETCO2 Device Type Portable Bedside Monitor   ETCO2 High Alarm 50   ETCO2 Low Alarm 20   Education   $ Education 15 min;Other (see comment);DME Oxygen   Respiratory Evaluation   $ Care Plan Tech Time 15 min

## 2022-07-15 NOTE — ANESTHESIA POSTPROCEDURE EVALUATION
Anesthesia Post Evaluation    Patient: Tayla Ewing    Procedure(s) Performed: Procedure(s) (LRB):  COLECTOMY, SIGMOID, LAPAROSCOPIC (N/A)  SALPINGO-OOPHORECTOMY, LAPAROSCOPIC (Left)  INCISION AND DRAINAGE, ABSCESS (N/A)  CYSTOSCOPY, WITH URETERAL STENT INSERTION (N/A)    Final Anesthesia Type: general      Patient location during evaluation: PACU  Patient participation: Yes- Able to Participate  Level of consciousness: awake and alert  Post-procedure vital signs: reviewed and stable  Pain management: adequate  Airway patency: patent    PONV status at discharge: No PONV  Anesthetic complications: no      Cardiovascular status: blood pressure returned to baseline and stable  Respiratory status: unassisted and room air  Hydration status: euvolemic  Follow-up not needed.  Comments: Patient will have a Dilaudid PCA for post op pain control.          Vitals Value Taken Time   /55 07/15/22 1330   Temp 36.4 °C (97.6 °F) 07/15/22 1315   Pulse 87 07/15/22 1343   Resp 17 07/15/22 1343   SpO2 100 % 07/15/22 1343   Vitals shown include unvalidated device data.      No case tracking events are documented in the log.      Pain/Mickey Score: Pain Rating Prior to Med Admin: 4 (7/15/2022 12:35 PM)  Mickey Score: 8 (7/15/2022  1:15 PM)

## 2022-07-16 ENCOUNTER — ANESTHESIA EVENT (OUTPATIENT)
Dept: MEDSURG UNIT | Facility: HOSPITAL | Age: 77
End: 2022-07-16

## 2022-07-16 ENCOUNTER — ANESTHESIA (OUTPATIENT)
Dept: MEDSURG UNIT | Facility: HOSPITAL | Age: 77
End: 2022-07-16

## 2022-07-16 LAB
ALBUMIN SERPL BCP-MCNC: 2.9 G/DL (ref 3.5–5.2)
ALP SERPL-CCNC: 56 U/L (ref 55–135)
ALT SERPL W/O P-5'-P-CCNC: 16 U/L (ref 10–44)
ANION GAP SERPL CALC-SCNC: 8 MMOL/L (ref 8–16)
AST SERPL-CCNC: 15 U/L (ref 10–40)
BASOPHILS # BLD AUTO: 0.04 K/UL (ref 0–0.2)
BASOPHILS NFR BLD: 0.3 % (ref 0–1.9)
BILIRUB SERPL-MCNC: 0.9 MG/DL (ref 0.1–1)
BUN SERPL-MCNC: 6 MG/DL (ref 8–23)
CALCIUM SERPL-MCNC: 8.6 MG/DL (ref 8.7–10.5)
CHLORIDE SERPL-SCNC: 104 MMOL/L (ref 95–110)
CO2 SERPL-SCNC: 26 MMOL/L (ref 23–29)
CREAT SERPL-MCNC: 0.5 MG/DL (ref 0.5–1.4)
DIFFERENTIAL METHOD: ABNORMAL
EOSINOPHIL # BLD AUTO: 0 K/UL (ref 0–0.5)
EOSINOPHIL NFR BLD: 0.2 % (ref 0–8)
ERYTHROCYTE [DISTWIDTH] IN BLOOD BY AUTOMATED COUNT: 14.8 % (ref 11.5–14.5)
EST. GFR  (AFRICAN AMERICAN): >60 ML/MIN/1.73 M^2
EST. GFR  (NON AFRICAN AMERICAN): >60 ML/MIN/1.73 M^2
ESTIMATED AVG GLUCOSE: 120 MG/DL (ref 68–131)
GLUCOSE SERPL-MCNC: 105 MG/DL (ref 70–110)
HBA1C MFR BLD: 5.8 % (ref 4.5–6.2)
HCT VFR BLD AUTO: 31.9 % (ref 37–48.5)
HGB BLD-MCNC: 10.2 G/DL (ref 12–16)
IMM GRANULOCYTES # BLD AUTO: 0.06 K/UL (ref 0–0.04)
IMM GRANULOCYTES NFR BLD AUTO: 0.5 % (ref 0–0.5)
LYMPHOCYTES # BLD AUTO: 1.4 K/UL (ref 1–4.8)
LYMPHOCYTES NFR BLD: 11.7 % (ref 18–48)
MAGNESIUM SERPL-MCNC: 1.4 MG/DL (ref 1.6–2.6)
MCH RBC QN AUTO: 29.9 PG (ref 27–31)
MCHC RBC AUTO-ENTMCNC: 32 G/DL (ref 32–36)
MCV RBC AUTO: 94 FL (ref 82–98)
MONOCYTES # BLD AUTO: 0.7 K/UL (ref 0.3–1)
MONOCYTES NFR BLD: 5.7 % (ref 4–15)
NEUTROPHILS # BLD AUTO: 9.9 K/UL (ref 1.8–7.7)
NEUTROPHILS NFR BLD: 81.6 % (ref 38–73)
NRBC BLD-RTO: 0 /100 WBC
PLATELET # BLD AUTO: 235 K/UL (ref 150–450)
PMV BLD AUTO: 9.6 FL (ref 9.2–12.9)
POTASSIUM SERPL-SCNC: 3.5 MMOL/L (ref 3.5–5.1)
PROT SERPL-MCNC: 6.2 G/DL (ref 6–8.4)
RBC # BLD AUTO: 3.41 M/UL (ref 4–5.4)
SODIUM SERPL-SCNC: 138 MMOL/L (ref 136–145)
WBC # BLD AUTO: 12.18 K/UL (ref 3.9–12.7)

## 2022-07-16 PROCEDURE — 25000003 PHARM REV CODE 250: Performed by: FAMILY MEDICINE

## 2022-07-16 PROCEDURE — 63600175 PHARM REV CODE 636 W HCPCS: Performed by: INTERNAL MEDICINE

## 2022-07-16 PROCEDURE — 12000002 HC ACUTE/MED SURGE SEMI-PRIVATE ROOM

## 2022-07-16 PROCEDURE — 25000003 PHARM REV CODE 250: Performed by: INTERNAL MEDICINE

## 2022-07-16 PROCEDURE — 85025 COMPLETE CBC W/AUTO DIFF WBC: CPT | Performed by: FAMILY MEDICINE

## 2022-07-16 PROCEDURE — 99222 PR INITIAL HOSPITAL CARE,LEVL II: ICD-10-PCS | Mod: ,,, | Performed by: INTERNAL MEDICINE

## 2022-07-16 PROCEDURE — 80053 COMPREHEN METABOLIC PANEL: CPT | Performed by: FAMILY MEDICINE

## 2022-07-16 PROCEDURE — 83735 ASSAY OF MAGNESIUM: CPT | Performed by: FAMILY MEDICINE

## 2022-07-16 PROCEDURE — 27000221 HC OXYGEN, UP TO 24 HOURS

## 2022-07-16 PROCEDURE — 99222 1ST HOSP IP/OBS MODERATE 55: CPT | Mod: ,,, | Performed by: INTERNAL MEDICINE

## 2022-07-16 PROCEDURE — 99900035 HC TECH TIME PER 15 MIN (STAT)

## 2022-07-16 PROCEDURE — 63600175 PHARM REV CODE 636 W HCPCS: Performed by: FAMILY MEDICINE

## 2022-07-16 PROCEDURE — 63600175 PHARM REV CODE 636 W HCPCS: Performed by: SURGERY

## 2022-07-16 PROCEDURE — 97116 GAIT TRAINING THERAPY: CPT

## 2022-07-16 PROCEDURE — 36415 COLL VENOUS BLD VENIPUNCTURE: CPT | Performed by: FAMILY MEDICINE

## 2022-07-16 PROCEDURE — 97161 PT EVAL LOW COMPLEX 20 MIN: CPT

## 2022-07-16 PROCEDURE — 99900031 HC PATIENT EDUCATION (STAT)

## 2022-07-16 PROCEDURE — 94761 N-INVAS EAR/PLS OXIMETRY MLT: CPT

## 2022-07-16 PROCEDURE — 25000003 PHARM REV CODE 250: Performed by: SURGERY

## 2022-07-16 RX ORDER — CALCIUM GLUCONATE 20 MG/ML
3 INJECTION, SOLUTION INTRAVENOUS
Status: DISCONTINUED | OUTPATIENT
Start: 2022-07-16 | End: 2022-07-20 | Stop reason: HOSPADM

## 2022-07-16 RX ORDER — CALCIUM GLUCONATE 20 MG/ML
2 INJECTION, SOLUTION INTRAVENOUS
Status: DISCONTINUED | OUTPATIENT
Start: 2022-07-16 | End: 2022-07-20 | Stop reason: HOSPADM

## 2022-07-16 RX ORDER — MAGNESIUM SULFATE HEPTAHYDRATE 40 MG/ML
4 INJECTION, SOLUTION INTRAVENOUS ONCE
Status: COMPLETED | OUTPATIENT
Start: 2022-07-16 | End: 2022-07-16

## 2022-07-16 RX ORDER — POTASSIUM CHLORIDE 14.9 MG/ML
60 INJECTION INTRAVENOUS
Status: DISCONTINUED | OUTPATIENT
Start: 2022-07-16 | End: 2022-07-20 | Stop reason: HOSPADM

## 2022-07-16 RX ORDER — MAGNESIUM SULFATE HEPTAHYDRATE 40 MG/ML
4 INJECTION, SOLUTION INTRAVENOUS
Status: DISCONTINUED | OUTPATIENT
Start: 2022-07-16 | End: 2022-07-20 | Stop reason: HOSPADM

## 2022-07-16 RX ORDER — POTASSIUM CHLORIDE 29.8 MG/ML
80 INJECTION INTRAVENOUS
Status: DISCONTINUED | OUTPATIENT
Start: 2022-07-16 | End: 2022-07-20 | Stop reason: HOSPADM

## 2022-07-16 RX ORDER — FLUCONAZOLE 2 MG/ML
200 INJECTION, SOLUTION INTRAVENOUS
Status: DISCONTINUED | OUTPATIENT
Start: 2022-07-16 | End: 2022-07-20 | Stop reason: HOSPADM

## 2022-07-16 RX ORDER — CALCIUM GLUCONATE 20 MG/ML
1 INJECTION, SOLUTION INTRAVENOUS
Status: DISCONTINUED | OUTPATIENT
Start: 2022-07-16 | End: 2022-07-20 | Stop reason: HOSPADM

## 2022-07-16 RX ORDER — MEROPENEM AND SODIUM CHLORIDE 1 G/50ML
1 INJECTION, SOLUTION INTRAVENOUS
Status: DISCONTINUED | OUTPATIENT
Start: 2022-07-16 | End: 2022-07-20 | Stop reason: HOSPADM

## 2022-07-16 RX ORDER — MAGNESIUM SULFATE HEPTAHYDRATE 40 MG/ML
2 INJECTION, SOLUTION INTRAVENOUS
Status: DISCONTINUED | OUTPATIENT
Start: 2022-07-16 | End: 2022-07-20 | Stop reason: HOSPADM

## 2022-07-16 RX ORDER — POTASSIUM CHLORIDE 29.8 MG/ML
40 INJECTION INTRAVENOUS
Status: DISCONTINUED | OUTPATIENT
Start: 2022-07-16 | End: 2022-07-20 | Stop reason: HOSPADM

## 2022-07-16 RX ORDER — ENOXAPARIN SODIUM 100 MG/ML
40 INJECTION SUBCUTANEOUS EVERY 24 HOURS
Status: DISCONTINUED | OUTPATIENT
Start: 2022-07-16 | End: 2022-07-20 | Stop reason: HOSPADM

## 2022-07-16 RX ORDER — POTASSIUM CHLORIDE 20 MEQ/1
40 TABLET, EXTENDED RELEASE ORAL ONCE
Status: COMPLETED | OUTPATIENT
Start: 2022-07-16 | End: 2022-07-16

## 2022-07-16 RX ADMIN — MUPIROCIN 1 G: 20 OINTMENT TOPICAL at 08:07

## 2022-07-16 RX ADMIN — MEROPENEM AND SODIUM CHLORIDE 500 MG: 500 INJECTION, SOLUTION INTRAVENOUS at 10:07

## 2022-07-16 RX ADMIN — ENOXAPARIN SODIUM 40 MG: 40 INJECTION SUBCUTANEOUS at 05:07

## 2022-07-16 RX ADMIN — MUPIROCIN 1 G: 20 OINTMENT TOPICAL at 10:07

## 2022-07-16 RX ADMIN — CARVEDILOL 3.12 MG: 3.12 TABLET, FILM COATED ORAL at 10:07

## 2022-07-16 RX ADMIN — SODIUM CHLORIDE: 0.9 INJECTION, SOLUTION INTRAVENOUS at 10:07

## 2022-07-16 RX ADMIN — PANTOPRAZOLE SODIUM 40 MG: 40 TABLET, DELAYED RELEASE ORAL at 05:07

## 2022-07-16 RX ADMIN — MEROPENEM AND SODIUM CHLORIDE 1 G: 1 INJECTION, SOLUTION INTRAVENOUS at 05:07

## 2022-07-16 RX ADMIN — CARVEDILOL 3.12 MG: 3.12 TABLET, FILM COATED ORAL at 08:07

## 2022-07-16 RX ADMIN — MEROPENEM AND SODIUM CHLORIDE 500 MG: 500 INJECTION, SOLUTION INTRAVENOUS at 01:07

## 2022-07-16 RX ADMIN — POTASSIUM CHLORIDE 40 MEQ: 1500 TABLET, EXTENDED RELEASE ORAL at 12:07

## 2022-07-16 RX ADMIN — Medication: at 05:07

## 2022-07-16 RX ADMIN — FLUCONAZOLE 200 MG: 2 INJECTION, SOLUTION INTRAVENOUS at 03:07

## 2022-07-16 RX ADMIN — MAGNESIUM SULFATE 4 G: 2 INJECTION INTRAVENOUS at 12:07

## 2022-07-16 NOTE — NURSING
Upon arrival to room, Dr. Mott at bed side. Small amt of bloody drainage noted around RADHAMES drain site.  RADHAMES drain was present from previous procedure.  He stated he would expect some drainage as he did several surgical procedures.

## 2022-07-16 NOTE — ANESTHESIA POST-OP PAIN MANAGEMENT
Acute Pain Service Progress Note    Tayla Ewing is a 77 y.o., female, [id number].    Patient postop day 1 status post colectomy.  Patient on Dilaudid PCA pump for pain control.  Patient is awake alert and oriented without any signs of symptoms of over-sedation.  Saturations 98% on 2 L nasal cannula.  Patient denies any nausea vomiting or pruritus.  Patient with good pain control overnight.  Patient without any other complaints.  Patient remains NPO.  Plan:  Continue PCA until diet is advanced then change over to p.o. narcotics.  We will follow.

## 2022-07-16 NOTE — PROGRESS NOTES
"Counts include 234 beds at the [facility] Medicine Progress Note  Patient Name: Tayla Ewing MRN: [id number]   Patient Class: IP- Inpatient  Length of Stay: 1   Admission Date: 7/15/2022  5:29 AM Attending Physician: Jaime Willis MD   Primary Care Provider: Marcel Mueller MD Face-to-Face encounter date: 07/16/2022   Chief Complaint: No chief complaint on file.      Subjective:    Interval History   Patient is doing fairly well.  Sitting in chair  Still has abdominal pain  Denies chest pain, shortness of breath, palpitations, , nausea/vomiting.   No concerns/issues overnight reported by the patient or the nursing staff.  Reviewed the labs and discussed the plan of care.   Discussed plan of care with family    Review of Systems   All other Review of Systems were found to be negative expect for that mentioned already in HPI.     Objective:   Physical Exam  BP (!) 142/71   Pulse 83   Temp 98.2 °F (36.8 °C) (Oral)   Resp 18   Ht 5' 3" (1.6 m)   Wt 67.2 kg (148 lb 2.4 oz)   SpO2 100%   Breastfeeding No   BMI 26.24 kg/m²   Constitutional: No distress.   HENT: Atraumatic.   Cardiovascular: Normal rate, regular rhythm and normal heart sounds.   Pulmonary/Chest: Effort normal. Clear to auscultation bilaterally. No wheezes.   Abdominal: Soft. Bowel sounds are normal. Exhibits no distension and no mass. Abdominal tenderness present  Neurological: Alert.   Skin: Skin is warm and dry.     Labs and Imaging    Significant Labs: All pertinent labs within the past 24 hours have been reviewed.    Significant Imaging: I have reviewed all pertinent imaging results/findings within the past 24 hours.    I have reviewed the Vitals, labs and imaging as above.     Assessment & Plan:   Tayla Ewing is a 77 y.o. female admitted for      Diverticular disease of intestine with perforation and abscess  Ovarian mass s/p Salpingo-oophorectomy   S/p ureteral stenting     Plan  Continue current management  Pain well controlled with " pump  Antibiotics as per ID  Diet as per surgery  Ambulate, PT/OT    Core measures:  - Code status: full code   - Diet: as per surgery  VTE Risk Mitigation (From admission, onward)         Ordered     IP VTE HIGH RISK PATIENT  Once         07/1945     Place sequential compression device  Until discontinued         07/1945                Discharge Planning:   Discharge Planning   JAYSHREE: 07/18    Code Status: Full Code   Is the patient medically ready for discharge?: No    Reason for patient still in hospital (select all that apply): Patient trending condition  Discharge Plan A: Home with assistance from family        Above encounter included review of the medical records, interviewing and examining the patient face-to-face, discussion with family and other health care providers, ordering and interpreting lab/test results and formulating a plan of care.     Medical Decision Making:  [] Low Complexity  [x] Moderate Complexity  [] High Complexity    Jaime Willis MD  [facility] Hospitalist  07/16/2022

## 2022-07-16 NOTE — CONSULTS
Consult Note  Infectious Disease    Reason for Consult:  Follow-up intra-abdominal abscess from perforated diverticulitis    HPI: Tayla Ewing is a 77 y.o. female Seen at [facility] for diverticular abscess 6/2 which was a recurrence or relapse of prior perforation. She had been treated with a course of IV antibiotics at home but had recurrence. She had percutaneous drain placed and culture grew Hemophilus and pseudomonas. She was discharged with drain and on zosyn IV. She had a repeat CT on 6/11 which was about the same.  She was followed by me in the office, last seen 07/06.  She tolerated the antibiotics extremely well.  She had a follow-up CT scan of the abdomen showing a reduction in size of the primary abscess but evolution of an abscess near the dome of the bladder.  A RADHAMES drain was in place with purulent drainage through her admission yesterday.  Zosyn was continued along with oral Diflucan through the time of her admission yesterday for elective sigmoid resection.  Operative findings and procedure discussed with Dr. Mott.  Left-sided ovarian mass was resected as well.  She is now on meropenem as discussed with general surgery.  Today she has done extremely well, requiring no oxygen, walked in the rios, and no nausea or vomiting, active bowel sounds but no flatus yet.  No fever and labs are stable.    Review of patient's allergies indicates:  No Known Allergies  Past Medical History:   Diagnosis Date    Back pain     Diverticulitis of colon with perforation 04/05/2022    History of heart artery stent 09/06/2018    History of heart attack 09/06/2018    Hypertension     Intra-abdominal abscess 05/31/2022    post diverticulitis    Multiple sclerosis     Neck pain     Neurogenic bladder 06/24/2015    TMJ (dislocation of temporomandibular joint)      Past Surgical History:   Procedure Laterality Date    BRAIN BIOPSY      2009    HYSTERECTOMY       Social History     Socioeconomic History    Marital  status:    Tobacco Use    Smoking status: Never Smoker    Smokeless tobacco: Never Used   Substance and Sexual Activity    Alcohol use: No     Family History   Problem Relation Age of Onset    Hypertension Mother          Review of Systems:   No chills, fever, sweats,   No change in vision,    Hearing deficit  No pain in mouth or throat.  Mouth is dry from being NPO  No chest pain,   No cough, sputum production, shortness of breath, dyspnea on exertion,   No nausea, vomiting, diarrhea  or unexpected abd pain,  No dysphagia, odynophagia  Page catheter is in place, status post cystoscopy and bilateral ureteral stents prior to the sigmoid surgery.     No swelling of joints, redness of joints, injuries, or new focal pain  No unusual headaches, dizziness,   falls  No anxiety, depression, substance abuse, sleep disturbance  No diabetes   No bleeding, lymphadenopathy,  malignancy, unusual bruising  No new rashes, lesions,      Implants:  Left-sided PICC line has been placed for 1 month  Antibiotic History:  Zosyn plus oral Diflucan for 6 weeks prior to this admission    EXAM & DIAGNOSTICS REVIEWED:   Vitals:     Temp:  [97 °F (36.1 °C)-98.2 °F (36.8 °C)]   Temp: 98.2 °F (36.8 °C) (07/16/22 0817)  Pulse: 83 (07/16/22 0817)  Resp: 18 (07/16/22 0817)  BP: (!) 142/71 (07/16/22 0817)  SpO2: 100 % (07/16/22 0817)    Intake/Output Summary (Last 24 hours) at 7/16/2022 0850  Last data filed at 7/15/2022 1800  Gross per 24 hour   Intake 2200 ml   Output 1325 ml   Net 875 ml       General:  In NAD. Alert and attentive, cooperative, comfortable  Eyes:  Anicteric, PERRL, EOMI  ENT:  No ulcers, exudates, thrush, nares patent, dentition is good, mouth is dry  Neck:  supple, no masses or adenopathy appreciated  Lungs: Clear, no consolidation, rales, wheezes, rub  Heart:  RRR, no gallop/murmur/rub noted  Abd:  Soft, appropriately tender, ND, normal BS, no masses or organomegaly appreciated.  RADHAMES drain in place  :   Page,  urine clear,   Musc:  Joints without effusion, swelling, erythema, synovitis, muscle wasting.   Skin:  No rashes.   Neuro:             Alert, attentive, speech fluent, face symmetric, moves all extremities, no focal weakness. Ambulatory  Psych: Calm, cooperative     Extrem: No edema, erythema, phlebitis, cellulitis, warm and well perfused  VAD:   Left arm PICC line    Isolation:  None  Wound: Not disturbed           General Labs reviewed:  Recent Labs   Lab 07/15/22  2017 07/16/22  0430   WBC 14.26* 12.18   HGB 10.4* 10.2*   HCT 31.2* 31.9*    235       Recent Labs   Lab 07/15/22  1721 07/15/22  2017 07/16/22  0430   NA  --  137 138   K  --  3.3* 3.5   CL  --  106 104   CO2  --  22* 26   BUN 9 8 6*   CREATININE 0.6 0.6 0.5   CALCIUM  --  8.7 8.6*   PROT  --  6.3 6.2   BILITOT  --  0.9 0.9   ALKPHOS  --  61 56   ALT  --  18 16   AST  --  15 15     No results for input(s): CRP in the last 168 hours.      Micro:  Microbiology Results (last 7 days)     Procedure Component Value Units Date/Time    Blood culture [[id number]] Collected: 07/15/22 2017    Order Status: Completed Specimen: Blood from Antecubital, Right Updated: 07/16/22 0517     Blood Culture, Routine No Growth to date    Blood culture [[id number]] Collected: 07/15/22 2020    Order Status: Completed Specimen: Blood Updated: 07/16/22 0517     Blood Culture, Routine No Growth to date          Imaging Reviewed:   CXR   CT prior to admission    Cardiology:    IMPRESSION & PLAN   1.  Persistent intra abdominal abscesses status post perforated diverticulitis    2.  Status post sigmoid colon resection, drainage of small abscesses, resection of ovarian mass      Recommendations:  Continue meropenem and will Rx IV Diflucan  Duration of Page per Dr. Mott    Will follow-up  Discussed with family at the bedside  Medical Decision Making during this encounter was  [_] Low Complexity  [_] Moderate Complexity  x[  ] High Complexity

## 2022-07-16 NOTE — CARE UPDATE
07/16/22 0800   PRE-TX-O2   O2 Device (Oxygen Therapy) nasal cannula   $ Is the patient on Low Flow Oxygen? Yes   Flow (L/min) 2   SpO2 99 %   Pulse Oximetry Type Continuous   $ Pulse Oximetry - Multiple Charge Pulse Oximetry - Multiple   Pulse 84   Resp 15   ETCO2   $ ETCO2 Usage Currently wearing   ETCO2 (mmHg) 33 mmHg   Respiratory Evaluation   $ Care Plan Tech Time 15 min

## 2022-07-16 NOTE — PROGRESS NOTES
[facility]  General Surgery  Progress Note    Subjective:     Interval History:  No acute events overnight.  She is awake and alert.  Feeling fairly well.  No nausea.    Post-Op Info:  Procedure(s) (LRB):  COLECTOMY, SIGMOID, LAPAROSCOPIC (N/A)  SALPINGO-OOPHORECTOMY, LAPAROSCOPIC (Left)  INCISION AND DRAINAGE, ABSCESS (N/A)  CYSTOSCOPY, WITH URETERAL STENT INSERTION (N/A)   1 Day Post-Op      Medications:  Continuous Infusions:   sodium chloride 0.9% 100 mL/hr at 07/16/22 1009    hydromorphone in 0.9 % NaCl 6 mg/30 ml       Scheduled Meds:   carvediloL  3.125 mg Oral BID    fluconazole (DIFLUCAN) IV (PEDS and ADULTS)  200 mg Intravenous Q24H    fluticasone propionate  1 spray Each Nostril Daily    LIDOcaine (PF) 10 mg/ml (1%)  1 mL Other Once    magnesium sulfate IVPB  4 g Intravenous Once    meropenem (MERREM) IVPB  1 g Intravenous Q8H    mupirocin  1 g Nasal BID    pantoprazole  40 mg Oral Daily     PRN Meds:acetaminophen, albuterol-ipratropium, calcium gluconate IVPB, calcium gluconate IVPB, calcium gluconate IVPB, dextrose 50%, dextrose 50%, diphenhydrAMINE, glucagon (human recombinant), hydrALAZINE, hydrALAZINE, magnesium sulfate IVPB, magnesium sulfate IVPB, melatonin, naloxone, ondansetron, potassium chloride in water **AND** potassium chloride in water **AND** potassium chloride in water, sodium chloride 0.9%, sodium chloride 0.9%, sodium phosphate IVPB, sodium phosphate IVPB, sodium phosphate IVPB     Objective:     Vital Signs (Most Recent):  Temp: 97.8 °F (36.6 °C) (07/16/22 1200)  Pulse: 88 (07/16/22 1200)  Resp: 14 (07/16/22 1200)  BP: (!) 149/67 (07/16/22 1200)  SpO2: 100 % (07/16/22 1200) Vital Signs (24h Range):  Temp:  [97.4 °F (36.3 °C)-98.2 °F (36.8 °C)] 97.8 °F (36.6 °C)  Pulse:  [] 88  Resp:  [14-20] 14  SpO2:  [97 %-100 %] 100 %  BP: (118-170)/(66-75) 149/67       Intake/Output Summary (Last 24 hours) at 7/16/2022 1419  Last data filed at 7/16/2022 1000  Gross  per 24 hour   Intake --   Output 1020 ml   Net -1020 ml       Physical Exam  Constitutional:       General: She is not in acute distress.  Pulmonary:      Effort: Pulmonary effort is normal. No respiratory distress.   Abdominal:      General: There is distension.      Palpations: Abdomen is soft.      Tenderness: There is abdominal tenderness.      Comments: Appropriately tender.  Nondistended.   Neurological:      Mental Status: She is alert and oriented to person, place, and time.         Significant Labs:  CBC:   Recent Labs   Lab 07/16/22  0430   WBC 12.18   RBC 3.41*   HGB 10.2*   HCT 31.9*      MCV 94   MCH 29.9   MCHC 32.0     CMP:   Recent Labs   Lab 07/16/22  0430      CALCIUM 8.6*   ALBUMIN 2.9*   PROT 6.2      K 3.5   CO2 26      BUN 6*   CREATININE 0.5   ALKPHOS 56   ALT 16   AST 15   BILITOT 0.9       Significant Diagnostics:  I have reviewed all pertinent imaging results/findings within the past 24 hours.    Assessment/Plan:     Active Diagnoses:    Diagnosis Date Noted POA    PRINCIPAL PROBLEM:  Diverticular disease of intestine with perforation and abscess [K57.80] 07/15/2022 Yes    Encounter for long-term (current) use of antibiotics [Z79.2]  Not Applicable    Ovarian mass, left [N83.8] 07/15/2022 Yes    HTN (hypertension) [I10] 07/15/2022 Yes    MS (multiple sclerosis) [G35] 12/12/2014 Yes    Prophylactic immunotherapy [Z29.8] 12/12/2014 Not Applicable      Problems Resolved During this Admission:     -will start sips of clears.  -PT, ambulate  -IS  -DVT prophylaxis    Antonio Mott III, MD  General Surgery  [facility]

## 2022-07-16 NOTE — PT/OT/SLP EVAL
Physical Therapy Evaluation    Patient Name:  Tayla Ewing   MRN:  [id number]    Recommendations:     Discharge Recommendations:  home   Discharge Equipment Recommendations:  (Possibly RW)   Barriers to discharge: None    Assessment:     Tayla Ewing is a 77 y.o. female admitted with a medical diagnosis of Diverticular disease of intestine with perforation and abscess.  She presents with the following impairments/functional limitations:     Impaired functional mobility, impaired ambulation, pain, decreased cardiopulmonary response to physical activity.    Rehab Prognosis: Good; patient would benefit from acute skilled PT services to address these deficits and reach maximum level of function.    Recent Surgery: Procedure(s) (LRB):  COLECTOMY, SIGMOID, LAPAROSCOPIC (N/A)  SALPINGO-OOPHORECTOMY, LAPAROSCOPIC (Left)  INCISION AND DRAINAGE, ABSCESS (N/A)  CYSTOSCOPY, WITH URETERAL STENT INSERTION (N/A) 1 Day Post-Op    Plan:     During this hospitalization, patient to be seen daily to address the identified rehab impairments via gait training, therapeutic activities, therapeutic exercises and progress toward the following goals:    · Plan of Care Expires:  07/30/22    Subjective     Chief Complaint: Post op pain  Patient/Family Comments/goals: D/C home with spouse  Pain/Comfort:  · Pain Rating 1: 5/10  · Location - Side 1: Bilateral  · Location 1: abdomen    Patients cultural, spiritual, Sabianism conflicts given the current situation:      Living Environment:  Pt lives with her  in a [facility] w/o entry steps.  Prior to admission, patients level of function was independent .  Equipment used at home: none.  DME owned (not currently used): none.  Upon discharge, patient will have assistance from spouse    Objective:     Communicated with nurse prior to session.  Patient found supine with bed alarm, peripheral IV, telemetry, oxygen, pulse ox (continuous)  upon PT entry to room.    General Precautions: Standard, fall    Orthopedic Precautions:    Braces:    Respiratory Status: Nasal cannula, flow 2 L/min    Exams:  · Cognitive Exam:  Patient is oriented to Person, Place, Time and Situation  · RLE ROM: WFL  · RLE Strength: WFL  · LLE ROM: WFL  · LLE Strength: WFL    Functional Mobility:  · Bed Mobility:     · Supine to Sit: contact guard assistance  · Transfers:     · Sit to Stand:  contact guard assistance with rolling walker  · Gait: 150 ft RW and CGA    Therapeutic Activities and Exercises:  Ptwas educated on the role of PT  for assessment, treatment with emphasis on safety and increased mobility and D/C  recommendations.    AM-PAC 6 CLICK MOBILITY  Total Score:19     Patient left up in chair with all lines intact, call button in reach and  and daughter present.    GOALS:   Multidisciplinary Problems     Physical Therapy Goals        Problem: Physical Therapy    Goal Priority Disciplines Outcome Goal Variances Interventions   Physical Therapy Goal     PT, PT/OT      Description: Goals to be met by: 2022    Patient will increase functional independence with mobility by performin. Supine to sit with Modified [place]  2. Sit to stand transfer with Modified [place]  3. Gait  x 300 feet with Modified [place]  with or without AD                     History:     Past Medical History:   Diagnosis Date    Back pain     Diverticulitis of colon with perforation 2022    History of heart artery stent 2018    History of heart attack 2018    Hypertension     Intra-abdominal abscess 2022    post diverticulitis    Multiple sclerosis     Neck pain     Neurogenic bladder 2015    TMJ (dislocation of temporomandibular joint)        Past Surgical History:   Procedure Laterality Date    BRAIN BIOPSY      2009    HYSTERECTOMY         Time Tracking:     PT Received On: 22  PT Start Time: 910     PT Stop Time: 930  PT Total Time (min): 20 min     Billable Minutes:  Evaluation 12 minutes and Gait Training 8 minutes      07/16/2022

## 2022-07-16 NOTE — PLAN OF CARE
[facility]  Initial Discharge Assessment       Primary Care Provider: Marcel Mueller MD    Admission Diagnosis: Diverticular disease of intestine with perforation and abscess [K57.80]  Diverticular disease of large intestine with complication [K57.30]    Admission Date: 7/15/2022  Expected Discharge Date: 7/22/2022     met with patient at bedside in room 1104. Daughter, adult granddaughter and brother present for assessment. Patient confirmed two identifiers for privacy before providing the following information:    Patient lives with spouse, completes all activities of daily living independently without assistance of durable medical equipment. Prior to admission, had home IV infusions with [facility] and home health with [facility] to assist with home IV infusions.  will drive patient home. No needs anticipated.         Payor: MEDICARE / Plan: MEDICARE PART A & B / Product Type: Government /     Extended Emergency Contact Information  Primary Emergency Contact: Osei Ewing  Address: [address] [place] [address]  Home Phone: [phone]  Mobile Phone: [phone]  Relation: Spouse  Secondary Emergency Contact: Saladino,Tina  Mobile Phone: [phone]  Relation: Daughter  Preferred language: English   needed? No    Discharge Plan A: Home Health  Discharge Plan B: Home Health      [id number] [place], [place] - [address] AT [facility] [address]  [address] [place] [address]  Phone: [phone] Fax: [phone]    [address] [address]  Phone: [phone] Fax: [phone]    [facility]  for [facility], [place] - [address]  [address] [facility] [address]  Phone: [phone] Fax: [phone]    [facility] -  [place], [place] - [address]  [address] [facility] [address]  Phone: [phone] Fax: [phone]      Initial Assessment (most recent)     Adult Discharge Assessment - 07/16/22 0942        Discharge Assessment    Assessment Type Discharge Planning Assessment     Confirmed/corrected address, phone number and insurance Yes     Confirmed Demographics Correct on Facesheet     Source of Information patient;family;health record     Communicated JAYSHREE with patient/caregiver Yes   Patient and family expect DC in 7 to 10 days    Reason For Admission Diverticular disease of intestine with perforation and abscess     Lives With spouse     Facility Arrived From: home     Do you expect to return to your current living situation? Yes     Do you have help at home or someone to help you manage your care at home? Yes     Who are your caregiver(s) and their phone number(s)?  / Osei [phone]     Prior to hospitilization cognitive status: Alert/Oriented     Current cognitive status: Alert/Oriented     Walking or Climbing Stairs Difficulty none     Dressing/Bathing Difficulty none     Home Accessibility wheelchair accessible     Home Layout Able to live on 1st floor     Equipment Currently Used at Home none     Readmission within 30 days? No     Patient currently being followed by outpatient case management? No     Do you currently have service(s) that help you manage your care at home? Yes     Name and Contact number of agency [facility] for home IV infusion     Is the pt/caregiver preference to resume services with current agency Yes     Do you take prescription medications? Yes     Do you have prescription coverage? Yes     Do you have any problems affording any of your prescribed medications? No     Is the patient taking medications as prescribed? yes     Who is going to help you get home at discharge?  / Osei [phone]     How do you get  to doctors appointments? family or friend will provide     Are you on dialysis? No     Do you take coumadin? No     Discharge Plan A Home Health     Discharge Plan B Home Health     DME Needed Upon Discharge  none     Discharge Plan discussed with: Adult children;Patient        Relationship/Environment    Name(s) of Who Lives With Patient  / Osei [phone]

## 2022-07-16 NOTE — CONSULTS
[facility] Medicine   CONSULT     Patient Name: Tayla Ewing  MRN: [id number]  Admission Date: 7/15/2022  5:29 AM  Attending Physician: Anais Ponce MD  Primary Care Provider: Marcel Mueller MD  Face-to-Face encounter date: 07/15/2022    Patient information was obtained from patient, past medical records, ER physician, and ER records.     HISTORY OF PRESENT ILLNESS:     Tayla Ewing is a 77 y.o. White female   With PMH of diverticulitis with colon perforation,  Intra-abdominal abscess with fistula,  S/p percutaneous drain placement,  who presents for scheduled surgery.    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    Hospital medicine is consulted for medical management.  Pt with MS and HTN.  Pt takes norvasc, coreg, benicar Hct.  Pt is also on brilinta, which was held for surgery  She was previously on disease modulating therapy for MS  She has had both diagnoses for years    Pt is currently slightly confused as she has just returned from surgery  She is alert and responsive  A PCA pump is at bedside  She reports her abdominal pain is controlled  No CP  No SOB  No fever or chills    REVIEW OF SYSTEMS:     All systems reviewed and are negative except as noted per above.    PAST MEDICAL HISTORY:     Past Medical History:   Diagnosis Date    Back pain     Diverticulitis of colon with perforation 04/05/2022    History of heart artery stent 09/06/2018    History of heart attack 09/06/2018    Hypertension     Intra-abdominal abscess 05/31/2022    post diverticulitis    Multiple sclerosis     Neck pain     Neurogenic bladder 06/24/2015    TMJ (dislocation of temporomandibular joint)        PAST SURGICAL HISTORY:     Past Surgical History:   Procedure Laterality Date     BRAIN BIOPSY      2009    HYSTERECTOMY         ALLERGIES:   Patient has no known allergies.    FAMILY HISTORY:     Family History   Problem Relation Age of Onset    Hypertension Mother        SOCIAL HISTORY:     Social History     Tobacco Use    Smoking status: Never Smoker    Smokeless tobacco: Never Used   Substance Use Topics    Alcohol use: No        Social History     Substance and Sexual Activity   Sexual Activity Not on file        HOME MEDICATIONS:     Prior to Admission medications    Medication Sig Start Date End Date Taking? Authorizing Provider   amLODIPine (NORVASC) 5 MG tablet Take 5 mg by mouth once daily. 8/27/18  Yes Historical Provider   b complex vitamins tablet Take 1 tablet by mouth once daily.   Yes Historical Provider   carvedilol (COREG) 6.25 MG tablet Take 6.25 mg by mouth 2 (two) times daily. 8/27/18  Yes Historical Provider   cholecalciferol, vitamin D3, 125 mcg (5,000 unit) Tab Take 5,000 Units by mouth once daily.   Yes Historical Provider   CRESTOR 10 mg tablet Take 10 mg by mouth once daily. 3/31/22  Yes Historical Provider   ezetimibe (ZETIA) 10 mg tablet Take 10 mg by mouth once daily. 3/31/22  Yes Historical Provider   olmesartan-hydrochlorothiazide (BENICAR HCT) 20-12.5 mg per tablet Take 1 tablet by mouth once daily. 8/27/18  Yes Historical Provider   pantoprazole (PROTONIX) 40 MG tablet Take 40 mg by mouth once daily.   Yes Historical Provider   ciprofloxacin HCl (CIPRO) 500 MG tablet Take 500 mg by mouth 2 (two) times daily. 1/17/22   Historical Provider   fluticasone propionate (FLONASE) 50 mcg/actuation nasal spray 1-2 sprays by Each Nostril route once daily. 1/21/22   Historical Provider   gabapentin (NEURONTIN) 100 MG capsule Take 1 capsule (100 mg total) by mouth 3 (three) times daily. 11/29/17   Shannen Solo PA-C   piperacillin sodium/tazobactam (PIPERACILLIN-TAZOBACTAM 3.375G/50ML D5W, READY TO MIX,) Inject 50 mLs (3.375 g total) into the vein every 8 (eight)  "hours. 6/8/22   Bruce Ham MD   pulse oximeter (PULSE OXIMETER) device Use twice daily at 8 AM and 3 PM and record the value in MyChart as directed. 1/22/21   Marcel Mueller MD   ticagrelor (BRILINTA) 60 mg tablet Take 60 mg by mouth 2 (two) times daily.    Historical Provider   zinc sulfate (ZINC-15 ORAL) Take 1 tablet by mouth once daily.    Historical Provider   atorvastatin (LIPITOR) 80 MG tablet Take 80 mg by mouth once daily. 8/27/18 5/31/22  Historical Provider   azelastine (ASTELIN) 137 mcg (0.1 %) nasal spray SMARTSIG:Both Nares 1/17/22 5/31/22  Historical Provider       PHYSICAL EXAM:     /72   Pulse 85   Temp 97.6 °F (36.4 °C) (Oral)   Resp 16   Ht 5' 3" (1.6 m)   Wt 67.2 kg (148 lb 2.4 oz)   SpO2 98%   Breastfeeding No   BMI 26.24 kg/m²     Gen: alert, responsive  HEENT:  Eyes - no pallor  External ears with no lesions  Nares patent  Mouth - lips chapped  CV: RRR  Lungs: CTA B/L  Abd: soft, appropriately TTP, ND  Ext: no atrophy or edema  Skin: warm, dry  Neuro: alert, responsive, intermittently is confused by questions (recently received anesthesia), follows commands without difficulty  Psych: pleasant     LABS AND IMAGING:     Labs Reviewed   CULTURE, BLOOD   CULTURE, BLOOD   SARS-COV-2 RNA AMPLIFICATION, QUAL   UREA NITROGEN (BUN)   CREATININE, SERUM   CBC W/ AUTO DIFFERENTIAL   COMPREHENSIVE METABOLIC PANEL   LACTIC ACID, PLASMA   PROTIME-INR   PROCALCITONIN   HEMOGLOBIN A1C   TISSUE SPECIMEN TO PATHOLOGY - SURGERY       No orders to display       ASSESSMENT & PLAN:   Tayla Ewing is a 77 y.o. female admitted for    Active Hospital Problems    Diagnosis  POA    *Diverticular disease of intestine with perforation and abscess [K57.80]  Yes    Ovarian mass, left [N83.8]  Yes    HTN (hypertension) [I10]  Yes    MS (multiple sclerosis) [G35]  Yes    Prophylactic immunotherapy [Z29.8]  Not Applicable      Resolved Hospital Problems   No resolved problems to display.    "     Plan    - continue merrem  - pt followed by ID outpt, consulting ID, thank you  - BCx  - PCA per anesthesiology  - adjusting HTN meds while pt is on PCA  - obtaining labs    Chronic conditions as noted above/below; home medications reviewed personally by me and restarted as appropriate  Electrolyte derangement:  Trending BMP; Mg; replacement prn  DVT ppx: lovenox held due to recent surgery  FULL CODE      Anais Ponce MD  [facility] Hospitalist  07/15/2022

## 2022-07-16 NOTE — CARE UPDATE
07/15/22 2113   Patient Assessment/Suction   Level of Consciousness (AVPU) alert   PRE-TX-O2   O2 Device (Oxygen Therapy) nasal cannula   $ Is the patient on Low Flow Oxygen? Yes   Flow (L/min) 2   SpO2 97 %   Pulse Oximetry Type Continuous   $ Pulse Oximetry - Multiple Charge Pulse Oximetry - Multiple   Pulse 85   Resp 19   ETCO2   $ ETCO2 Usage Currently wearing   ETCO2 (mmHg) 28 mmHg   ETCO2 Device Type Portable Bedside Monitor   ETCO2 High Alarm 50   ETCO2 Low Alarm 20   Aerosol Therapy   $ Aerosol Therapy Charges PRN treatment not required   Education   $ Education 15 min   Respiratory Evaluation   $ Care Plan Tech Time 15 min

## 2022-07-17 LAB
ALBUMIN SERPL BCP-MCNC: 2.8 G/DL (ref 3.5–5.2)
ALP SERPL-CCNC: 53 U/L (ref 55–135)
ALT SERPL W/O P-5'-P-CCNC: 14 U/L (ref 10–44)
ANION GAP SERPL CALC-SCNC: 4 MMOL/L (ref 8–16)
AST SERPL-CCNC: 13 U/L (ref 10–40)
BASOPHILS # BLD AUTO: 0.05 K/UL (ref 0–0.2)
BASOPHILS NFR BLD: 0.5 % (ref 0–1.9)
BILIRUB SERPL-MCNC: 0.8 MG/DL (ref 0.1–1)
BUN SERPL-MCNC: 6 MG/DL (ref 8–23)
CALCIUM SERPL-MCNC: 8.6 MG/DL (ref 8.7–10.5)
CHLORIDE SERPL-SCNC: 105 MMOL/L (ref 95–110)
CO2 SERPL-SCNC: 29 MMOL/L (ref 23–29)
CREAT SERPL-MCNC: 0.5 MG/DL (ref 0.5–1.4)
DIFFERENTIAL METHOD: ABNORMAL
EOSINOPHIL # BLD AUTO: 0.3 K/UL (ref 0–0.5)
EOSINOPHIL NFR BLD: 2.4 % (ref 0–8)
ERYTHROCYTE [DISTWIDTH] IN BLOOD BY AUTOMATED COUNT: 14.9 % (ref 11.5–14.5)
EST. GFR  (AFRICAN AMERICAN): >60 ML/MIN/1.73 M^2
EST. GFR  (NON AFRICAN AMERICAN): >60 ML/MIN/1.73 M^2
GLUCOSE SERPL-MCNC: 102 MG/DL (ref 70–110)
HCT VFR BLD AUTO: 27.5 % (ref 37–48.5)
HGB BLD-MCNC: 8.8 G/DL (ref 12–16)
IMM GRANULOCYTES # BLD AUTO: 0.05 K/UL (ref 0–0.04)
IMM GRANULOCYTES NFR BLD AUTO: 0.5 % (ref 0–0.5)
LYMPHOCYTES # BLD AUTO: 1.8 K/UL (ref 1–4.8)
LYMPHOCYTES NFR BLD: 16.8 % (ref 18–48)
MAGNESIUM SERPL-MCNC: 1.9 MG/DL (ref 1.6–2.6)
MCH RBC QN AUTO: 30 PG (ref 27–31)
MCHC RBC AUTO-ENTMCNC: 32 G/DL (ref 32–36)
MCV RBC AUTO: 94 FL (ref 82–98)
MONOCYTES # BLD AUTO: 0.7 K/UL (ref 0.3–1)
MONOCYTES NFR BLD: 6.9 % (ref 4–15)
NEUTROPHILS # BLD AUTO: 7.6 K/UL (ref 1.8–7.7)
NEUTROPHILS NFR BLD: 72.9 % (ref 38–73)
NRBC BLD-RTO: 0 /100 WBC
PLATELET # BLD AUTO: 187 K/UL (ref 150–450)
PMV BLD AUTO: 9.4 FL (ref 9.2–12.9)
POTASSIUM SERPL-SCNC: 4.1 MMOL/L (ref 3.5–5.1)
PROT SERPL-MCNC: 5.9 G/DL (ref 6–8.4)
RBC # BLD AUTO: 2.93 M/UL (ref 4–5.4)
SODIUM SERPL-SCNC: 138 MMOL/L (ref 136–145)
WBC # BLD AUTO: 10.4 K/UL (ref 3.9–12.7)

## 2022-07-17 PROCEDURE — 85025 COMPLETE CBC W/AUTO DIFF WBC: CPT | Performed by: FAMILY MEDICINE

## 2022-07-17 PROCEDURE — 27000221 HC OXYGEN, UP TO 24 HOURS

## 2022-07-17 PROCEDURE — 25000003 PHARM REV CODE 250: Performed by: SURGERY

## 2022-07-17 PROCEDURE — 25000003 PHARM REV CODE 250: Performed by: ANESTHESIOLOGY

## 2022-07-17 PROCEDURE — 36415 COLL VENOUS BLD VENIPUNCTURE: CPT | Performed by: FAMILY MEDICINE

## 2022-07-17 PROCEDURE — 63600175 PHARM REV CODE 636 W HCPCS: Performed by: INTERNAL MEDICINE

## 2022-07-17 PROCEDURE — 94799 UNLISTED PULMONARY SVC/PX: CPT

## 2022-07-17 PROCEDURE — 99900035 HC TECH TIME PER 15 MIN (STAT)

## 2022-07-17 PROCEDURE — 99231 PR SUBSEQUENT HOSPITAL CARE,LEVL I: ICD-10-PCS | Mod: ,,, | Performed by: INTERNAL MEDICINE

## 2022-07-17 PROCEDURE — 25000242 PHARM REV CODE 250 ALT 637 W/ HCPCS: Performed by: SURGERY

## 2022-07-17 PROCEDURE — 97116 GAIT TRAINING THERAPY: CPT

## 2022-07-17 PROCEDURE — 12000002 HC ACUTE/MED SURGE SEMI-PRIVATE ROOM

## 2022-07-17 PROCEDURE — 94761 N-INVAS EAR/PLS OXIMETRY MLT: CPT

## 2022-07-17 PROCEDURE — 25000003 PHARM REV CODE 250: Performed by: FAMILY MEDICINE

## 2022-07-17 PROCEDURE — 63600175 PHARM REV CODE 636 W HCPCS: Performed by: SURGERY

## 2022-07-17 PROCEDURE — 80053 COMPREHEN METABOLIC PANEL: CPT | Performed by: FAMILY MEDICINE

## 2022-07-17 PROCEDURE — 99231 SBSQ HOSP IP/OBS SF/LOW 25: CPT | Mod: ,,, | Performed by: INTERNAL MEDICINE

## 2022-07-17 PROCEDURE — 83735 ASSAY OF MAGNESIUM: CPT | Performed by: FAMILY MEDICINE

## 2022-07-17 RX ORDER — OXYCODONE HYDROCHLORIDE 5 MG/1
10 TABLET ORAL EVERY 4 HOURS PRN
Status: DISCONTINUED | OUTPATIENT
Start: 2022-07-17 | End: 2022-07-20 | Stop reason: HOSPADM

## 2022-07-17 RX ORDER — OXYCODONE HYDROCHLORIDE 5 MG/1
5 TABLET ORAL EVERY 4 HOURS PRN
Status: DISCONTINUED | OUTPATIENT
Start: 2022-07-17 | End: 2022-07-20 | Stop reason: HOSPADM

## 2022-07-17 RX ADMIN — PANTOPRAZOLE SODIUM 40 MG: 40 TABLET, DELAYED RELEASE ORAL at 05:07

## 2022-07-17 RX ADMIN — OXYCODONE HYDROCHLORIDE 10 MG: 5 TABLET ORAL at 04:07

## 2022-07-17 RX ADMIN — CARVEDILOL 3.12 MG: 3.12 TABLET, FILM COATED ORAL at 08:07

## 2022-07-17 RX ADMIN — MEROPENEM AND SODIUM CHLORIDE 1 G: 1 INJECTION, SOLUTION INTRAVENOUS at 06:07

## 2022-07-17 RX ADMIN — MEROPENEM AND SODIUM CHLORIDE 1 G: 1 INJECTION, SOLUTION INTRAVENOUS at 09:07

## 2022-07-17 RX ADMIN — FLUCONAZOLE 200 MG: 2 INJECTION, SOLUTION INTRAVENOUS at 01:07

## 2022-07-17 RX ADMIN — ENOXAPARIN SODIUM 40 MG: 40 INJECTION SUBCUTANEOUS at 04:07

## 2022-07-17 RX ADMIN — MUPIROCIN 1 G: 20 OINTMENT TOPICAL at 08:07

## 2022-07-17 RX ADMIN — OXYCODONE HYDROCHLORIDE 10 MG: 5 TABLET ORAL at 10:07

## 2022-07-17 RX ADMIN — CARVEDILOL 3.12 MG: 3.12 TABLET, FILM COATED ORAL at 09:07

## 2022-07-17 RX ADMIN — MUPIROCIN 1 G: 20 OINTMENT TOPICAL at 09:07

## 2022-07-17 RX ADMIN — MEROPENEM AND SODIUM CHLORIDE 1 G: 1 INJECTION, SOLUTION INTRAVENOUS at 01:07

## 2022-07-17 RX ADMIN — FLUTICASONE PROPIONATE 50 MCG: 50 SPRAY, METERED NASAL at 09:07

## 2022-07-17 NOTE — CARE UPDATE
07/17/22 0750   PRE-TX-O2   O2 Device (Oxygen Therapy) nasal cannula   $ Is the patient on Low Flow Oxygen? Yes   Flow (L/min) 2   SpO2 99 %   Pulse Oximetry Type Continuous   $ Pulse Oximetry - Multiple Charge Pulse Oximetry - Multiple   Pulse 80   Resp 15   ETCO2   $ ETCO2 Usage Currently wearing   ETCO2 (mmHg) 34 mmHg   ETCO2 Device Type Portable Bedside Monitor   Respiratory Evaluation   $ Care Plan Tech Time 15 min

## 2022-07-17 NOTE — PROGRESS NOTES
"Critical access hospital Medicine Progress Note  Patient Name: Tayla Ewing MRN: [id number]   Patient Class: IP- Inpatient  Length of Stay: 2   Admission Date: 7/15/2022  5:29 AM Attending Physician: Jaime Willis MD   Primary Care Provider: Marcel Mueller MD Face-to-Face encounter date: 07/17/2022   Chief Complaint: No chief complaint on file.      Subjective:    Interval History   Patient is doing fairly well.  Barely used PCA pump.  Still has some abdominal soreness  Denies chest pain, shortness of breath, palpitations, , nausea/vomiting.   No concerns/issues overnight reported by the patient or the nursing staff.  Reviewed the labs and discussed the plan of care.   Discussed plan of care with family    Review of Systems   All other Review of Systems were found to be negative expect for that mentioned already in HPI.     Objective:   Physical Exam  BP (!) 142/72   Pulse 76   Temp 97.5 °F (36.4 °C) (Oral)   Resp 18   Ht 5' 3" (1.6 m)   Wt 67.2 kg (148 lb 2.4 oz)   SpO2 100%   Breastfeeding No   BMI 26.24 kg/m²   Constitutional: No distress.   HENT: Atraumatic.   Cardiovascular: Normal rate, regular rhythm and normal heart sounds.   Pulmonary/Chest: Effort normal. Clear to auscultation bilaterally. No wheezes.   Abdominal: Soft. Bowel sounds are normal. Exhibits no distension and no mass. Abdominal tenderness present  Neurological: Alert.   Skin: Skin is warm and dry.     Labs and Imaging    Significant Labs: All pertinent labs within the past 24 hours have been reviewed.    Significant Imaging: I have reviewed all pertinent imaging results/findings within the past 24 hours.    I have reviewed the Vitals, labs and imaging as above.     Assessment & Plan:   Tayla Ewing is a 77 y.o. female admitted for      Diverticular disease of intestine with perforation and abscess  Ovarian mass s/p Salpingo-oophorectomy   S/p ureteral stenting     Plan  Continue current management  PCA pump d/c by " Anesthesia  Antibiotics as per ID  Diet as per surgery  Ambulate, PT/OT    Core measures:  - Code status: full code   - Diet: as per surgery  VTE Risk Mitigation (From admission, onward)         Ordered     enoxaparin injection 40 mg  Daily         07/16/22 1420     IP VTE HIGH RISK PATIENT  Once         07/1945     Place sequential compression device  Until discontinued         07/1945                Discharge Planning:   Discharge Planning   JAYSHREE: 07/18    Code Status: Full Code   Is the patient medically ready for discharge?: No    Reason for patient still in hospital (select all that apply): Patient trending condition  Discharge Plan A: Home with assistance from family        Above encounter included review of the medical records, interviewing and examining the patient face-to-face, discussion with family and other health care providers, ordering and interpreting lab/test results and formulating a plan of care.     Medical Decision Making:  [] Low Complexity  [x] Moderate Complexity  [] High Complexity    Jaime Willis MD  [facility] Hospitalist  07/17/2022

## 2022-07-17 NOTE — PROGRESS NOTES
Consult Note  Infectious Disease    Reason for Consult:  Follow-up intra-abdominal abscess from perforated diverticulitis    HPI: Tayla Ewing is a 77 y.o. female Seen at [facility] for diverticular abscess 6/2 which was a recurrence or relapse of prior perforation. She had been treated with a course of IV antibiotics at home but had recurrence. She had percutaneous drain placed and culture grew Hemophilus and pseudomonas. She was discharged with drain and on zosyn IV. She had a repeat CT on 6/11 which was about the same.  She was followed by me in the office, last seen 07/06.  She tolerated the antibiotics extremely well.  She had a follow-up CT scan of the abdomen showing a reduction in size of the primary abscess but evolution of an abscess near the dome of the bladder.  A RADHAMES drain was in place with purulent drainage through her admission yesterday.  Zosyn was continued along with oral Diflucan through the time of her admission yesterday for elective sigmoid resection.  Operative findings and procedure discussed with Dr. Mott.  Left-sided ovarian mass was resected as well.  She is now on meropenem as discussed with general surgery.  Today she has done extremely well, requiring no oxygen, walked in the rios, and no nausea or vomiting, active bowel sounds but no flatus yet.  No fever and labs are stable.    7/17: interim reviewed and all is well. She walked in the rios, juarez out, no flatus yet, pain is controlled. No antibiotic side effects.     EXAM & DIAGNOSTICS REVIEWED:   Vitals:     Temp:  [97.4 °F (36.3 °C)-98.3 °F (36.8 °C)]   Temp: 97.5 °F (36.4 °C) (07/17/22 1124)  Pulse: 76 (07/17/22 1124)  Resp: 18 (07/17/22 1124)  BP: (!) 142/72 (07/17/22 1124)  SpO2: 100 % (07/17/22 1124)    Intake/Output Summary (Last 24 hours) at 7/17/2022 1215  Last data filed at 7/16/2022 1800  Gross per 24 hour   Intake 770.2 ml   Output 854 ml   Net -83.8 ml       General:  In NAD. Alert and attentive, cooperative,  comfortable  Eyes:  Anicteric,   EOMI  ENT:  No ulcers, exudates, thrush, nares patent, dentition is good,   Neck:  supple,    Lungs: Clear, no consolidation, rales, wheezes, rub  Heart:  RRR, no gallop/murmur/rub noted  Abd:  Soft, appropriately tender, ND, normal BS, no masses or organomegaly appreciated.  RADHAMES drain in place with sanguineous output  :   Page out  Musc:  Joints without effusion, swelling, erythema, synovitis, muscle wasting.   Skin:  No rashes.   Neuro:             Alert, attentive, speech fluent, face symmetric, moves all extremities, no focal weakness. Ambulatory  Psych: Calm, cooperative     Extrem: No edema, erythema, phlebitis, cellulitis, warm and well perfused  VAD:   Left arm PICC line    Isolation:  None  Wound: Not disturbed           General Labs reviewed:  Recent Labs   Lab 07/15/22  2017 07/16/22  0430 07/17/22  0447   WBC 14.26* 12.18 10.40   HGB 10.4* 10.2* 8.8*   HCT 31.2* 31.9* 27.5*    235 187       Recent Labs   Lab 07/15/22  2017 07/16/22  0430 07/17/22  0447    138 138   K 3.3* 3.5 4.1    104 105   CO2 22* 26 29   BUN 8 6* 6*   CREATININE 0.6 0.5 0.5   CALCIUM 8.7 8.6* 8.6*   PROT 6.3 6.2 5.9*   BILITOT 0.9 0.9 0.8   ALKPHOS 61 56 53*   ALT 18 16 14   AST 15 15 13     No results for input(s): CRP in the last 168 hours.      Micro:  Microbiology Results (last 7 days)     Procedure Component Value Units Date/Time    Blood culture [[id number]] Collected: 07/15/22 2017    Order Status: Completed Specimen: Blood from Antecubital, Right Updated: 07/17/22 0232     Blood Culture, Routine No Growth to date      No Growth to date    Blood culture [[id number]] Collected: 07/15/22 2020    Order Status: Completed Specimen: Blood Updated: 07/17/22 0232     Blood Culture, Routine No Growth to date      No Growth to date          Imaging Reviewed:   CXR   CT prior to admission    Cardiology:    IMPRESSION & PLAN   1.  Persistent intra abdominal abscesses status post  perforated diverticulitis    2.  Status post sigmoid colon resection, drainage of small abscesses, resection of ovarian mass      Recommendations:  Continue meropenem and will Rx IV Diflucan     Will follow-up  Discussed with family at the bedside  Medical Decision Making during this encounter was  [_] Low Complexity  [_] Moderate Complexity  x[  ] High Complexity

## 2022-07-17 NOTE — CARE UPDATE
07/16/22 1947   Patient Assessment/Suction   Level of Consciousness (AVPU) alert   Respiratory Effort Normal   Expansion/Accessory Muscles/Retractions no use of accessory muscles   All Lung Fields Breath Sounds clear   Rhythm/Pattern, Respiratory unlabored   PRE-TX-O2   O2 Device (Oxygen Therapy) nasal cannula   $ Is the patient on Low Flow Oxygen? Yes   Flow (L/min) 2   SpO2 95 %   Pulse Oximetry Type Continuous   $ Pulse Oximetry - Multiple Charge Pulse Oximetry - Multiple   Pulse 88   Resp 19   ETCO2   $ ETCO2 Usage Currently wearing   ETCO2 (mmHg) 32 mmHg   ETCO2 Device Type Bedside Monitor   ETCO2 High Alarm 50   ETCO2 Low Alarm 20   Education   $ Education 15 min   Respiratory Evaluation   $ Care Plan Tech Time 15 min

## 2022-07-17 NOTE — NURSING
Removed patient Page,tolerated well .Urinated 300 cc after.Patient sat in chair most of the day and walked around in room.Patient states,I am   doing this in order to go home soon .

## 2022-07-17 NOTE — ANESTHESIA POST-OP PAIN MANAGEMENT
Acute Pain Service Progress Note    Tayla Ewing is a 77 y.o., female, [id number].    Postop day 2. Patient is doing very well after surgery.  Patient has been ambulating.  Patient reports very little pain and has barely used her PCA hydromorphone.  No sedation or respiratory depression.  Patient is tolerating clear liquids with no nausea.  Surgeon told her today that she can start drinking boost shakes.  I will discontinue PCA and start oxycodone.  Patient agrees with plan.  Re-consult if needed.

## 2022-07-17 NOTE — PT/OT/SLP PROGRESS
"Physical Therapy Treatment    Patient Name:  Tayla Ewing   MRN:  [id number]    Recommendations:     Discharge Recommendations:  home   Discharge Equipment Recommendations: none   Barriers to discharge: None    Assessment:     Tayla Ewing is a 77 y.o. female admitted with a medical diagnosis of Diverticular disease of intestine with perforation and abscess.  She presents with the following impairments/functional limitations:  weakness, impaired endurance, impaired functional mobility, pain, impaired cardiopulmonary response to activity.  Pt sitting up in bedside chair upon PT arrival to room with  present.  Pt is A & Ox3 and agreeable to PT stating "I need to walk 3 times a day!"  CGA for safety with sit to stand transfers.  Initially De with gait x50' with RW for sequence and management of lines and tubes, then progressed to 50' without AD and CGA for safety and balance.  Improved mobility and strength from yesterday, limited by abdominal pain.      Rehab Prognosis: Good; patient would benefit from acute skilled PT services to address these deficits and reach maximum level of function.    Recent Surgery: Procedure(s) (LRB):  COLECTOMY, SIGMOID, LAPAROSCOPIC (N/A)  SALPINGO-OOPHORECTOMY, LAPAROSCOPIC (Left)  INCISION AND DRAINAGE, ABSCESS (N/A)  CYSTOSCOPY, WITH URETERAL STENT INSERTION (N/A) 2 Days Post-Op    Plan:     During this hospitalization, patient to be seen 5 x/week to address the identified rehab impairments via gait training, therapeutic activities, therapeutic exercises and progress toward the following goals:    · Plan of Care Expires:  07/30/22    Subjective     Chief Complaint: need to walk  Patient/Family Comments/goals: get better and go home  Pain/Comfort:  · Pain Rating 1: 5/10  · Location 1: abdomen      Objective:     Communicated with RN prior to session.  Patient found up in chair with peripheral IV, oxygen, pulse ox (continuous), RADHAMES drain upon PT entry to room.     General " Precautions: Standard, fall   Orthopedic Precautions:    Braces:    Respiratory Status: Nasal cannula, flow 2 L/min     Functional Mobility:  · Transfers:     · Sit to Stand:  contact guard assistance with rolling walker  · Bed to Chair: contact guard assistance with  rolling walker  using  Step Transfer  · Gait: 1x50' with CGA and RW then 1x50' with CGA without AD.      AM-PAC 6 CLICK MOBILITY  Turning over in bed (including adjusting bedclothes, sheets and blankets)?: 3  Sitting down on and standing up from a chair with arms (e.g., wheelchair, bedside commode, etc.): 3  Moving from lying on back to sitting on the side of the bed?: 3  Moving to and from a bed to a chair (including a wheelchair)?: 3  Need to walk in hospital room?: 3  Climbing 3-5 steps with a railing?: 3  Basic Mobility Total Score: 18       Therapeutic Activities and Exercises:   Pt was educated on the following: call light use, importance of OOB activity and functional mobility to negate the negative effects of prolonged bed rest during this hospitalization, safe transfers/ambulation and discharge planning recommendations/options.     Patient left up in chair with all lines intact, call button in reach, RN notified and  present..    GOALS:   Multidisciplinary Problems     Physical Therapy Goals        Problem: Physical Therapy    Goal Priority Disciplines Outcome Goal Variances Interventions   Physical Therapy Goal     PT, PT/OT      Description: Goals to be met by: 2022    Patient will increase functional independence with mobility by performin. Supine to sit with Modified [place]  2. Sit to stand transfer with Modified [place]  3. Gait  x 300 feet with Modified [place]  with or without AD                     Time Tracking:     PT Received On: 22  PT Start Time: 1054     PT Stop Time: 1106  PT Total Time (min): 12 min     Billable Minutes: Gait Training 12    Treatment Type: Treatment  PT/PTA: PT            07/17/2022

## 2022-07-18 LAB
ALBUMIN SERPL BCP-MCNC: 2.6 G/DL (ref 3.5–5.2)
ALP SERPL-CCNC: 52 U/L (ref 55–135)
ALT SERPL W/O P-5'-P-CCNC: 14 U/L (ref 10–44)
ANION GAP SERPL CALC-SCNC: 5 MMOL/L (ref 8–16)
AST SERPL-CCNC: 12 U/L (ref 10–40)
BASOPHILS # BLD AUTO: 0.04 K/UL (ref 0–0.2)
BASOPHILS NFR BLD: 0.4 % (ref 0–1.9)
BILIRUB SERPL-MCNC: 0.7 MG/DL (ref 0.1–1)
BUN SERPL-MCNC: 10 MG/DL (ref 8–23)
CALCIUM SERPL-MCNC: 8.5 MG/DL (ref 8.7–10.5)
CHLORIDE SERPL-SCNC: 105 MMOL/L (ref 95–110)
CO2 SERPL-SCNC: 29 MMOL/L (ref 23–29)
CREAT SERPL-MCNC: 0.5 MG/DL (ref 0.5–1.4)
DIFFERENTIAL METHOD: ABNORMAL
EOSINOPHIL # BLD AUTO: 0.2 K/UL (ref 0–0.5)
EOSINOPHIL NFR BLD: 2.1 % (ref 0–8)
ERYTHROCYTE [DISTWIDTH] IN BLOOD BY AUTOMATED COUNT: 14.7 % (ref 11.5–14.5)
EST. GFR  (AFRICAN AMERICAN): >60 ML/MIN/1.73 M^2
EST. GFR  (NON AFRICAN AMERICAN): >60 ML/MIN/1.73 M^2
GLUCOSE SERPL-MCNC: 129 MG/DL (ref 70–110)
HCT VFR BLD AUTO: 30.6 % (ref 37–48.5)
HGB BLD-MCNC: 10 G/DL (ref 12–16)
IMM GRANULOCYTES # BLD AUTO: 0.04 K/UL (ref 0–0.04)
IMM GRANULOCYTES NFR BLD AUTO: 0.4 % (ref 0–0.5)
LYMPHOCYTES # BLD AUTO: 1.7 K/UL (ref 1–4.8)
LYMPHOCYTES NFR BLD: 17.5 % (ref 18–48)
MAGNESIUM SERPL-MCNC: 1.6 MG/DL (ref 1.6–2.6)
MCH RBC QN AUTO: 29.5 PG (ref 27–31)
MCHC RBC AUTO-ENTMCNC: 32.7 G/DL (ref 32–36)
MCV RBC AUTO: 90 FL (ref 82–98)
MONOCYTES # BLD AUTO: 0.7 K/UL (ref 0.3–1)
MONOCYTES NFR BLD: 6.9 % (ref 4–15)
NEUTROPHILS # BLD AUTO: 7.2 K/UL (ref 1.8–7.7)
NEUTROPHILS NFR BLD: 72.7 % (ref 38–73)
NRBC BLD-RTO: 0 /100 WBC
PLATELET # BLD AUTO: 232 K/UL (ref 150–450)
PMV BLD AUTO: 9.2 FL (ref 9.2–12.9)
POTASSIUM SERPL-SCNC: 3.5 MMOL/L (ref 3.5–5.1)
PROT SERPL-MCNC: 5.5 G/DL (ref 6–8.4)
RBC # BLD AUTO: 3.39 M/UL (ref 4–5.4)
SODIUM SERPL-SCNC: 139 MMOL/L (ref 136–145)
WBC # BLD AUTO: 9.89 K/UL (ref 3.9–12.7)

## 2022-07-18 PROCEDURE — 85025 COMPLETE CBC W/AUTO DIFF WBC: CPT | Performed by: FAMILY MEDICINE

## 2022-07-18 PROCEDURE — 80053 COMPREHEN METABOLIC PANEL: CPT | Performed by: FAMILY MEDICINE

## 2022-07-18 PROCEDURE — 63600175 PHARM REV CODE 636 W HCPCS: Performed by: INTERNAL MEDICINE

## 2022-07-18 PROCEDURE — 12000002 HC ACUTE/MED SURGE SEMI-PRIVATE ROOM

## 2022-07-18 PROCEDURE — 83735 ASSAY OF MAGNESIUM: CPT | Performed by: FAMILY MEDICINE

## 2022-07-18 PROCEDURE — 63600175 PHARM REV CODE 636 W HCPCS: Performed by: SURGERY

## 2022-07-18 PROCEDURE — 97116 GAIT TRAINING THERAPY: CPT | Mod: CQ

## 2022-07-18 PROCEDURE — 25000003 PHARM REV CODE 250: Performed by: FAMILY MEDICINE

## 2022-07-18 PROCEDURE — 25000003 PHARM REV CODE 250: Performed by: SURGERY

## 2022-07-18 PROCEDURE — 25000003 PHARM REV CODE 250: Performed by: ANESTHESIOLOGY

## 2022-07-18 PROCEDURE — 36415 COLL VENOUS BLD VENIPUNCTURE: CPT | Performed by: FAMILY MEDICINE

## 2022-07-18 RX ORDER — ACETAMINOPHEN 325 MG/1
650 TABLET ORAL ONCE AS NEEDED
Status: DISCONTINUED | OUTPATIENT
Start: 2022-07-15 | End: 2022-07-20 | Stop reason: HOSPADM

## 2022-07-18 RX ORDER — MAGNESIUM SULFATE HEPTAHYDRATE 40 MG/ML
4 INJECTION, SOLUTION INTRAVENOUS ONCE
Status: DISCONTINUED | OUTPATIENT
Start: 2022-07-18 | End: 2022-07-20 | Stop reason: HOSPADM

## 2022-07-18 RX ADMIN — MUPIROCIN 1 G: 20 OINTMENT TOPICAL at 08:07

## 2022-07-18 RX ADMIN — FLUCONAZOLE 200 MG: 2 INJECTION, SOLUTION INTRAVENOUS at 12:07

## 2022-07-18 RX ADMIN — PANTOPRAZOLE SODIUM 40 MG: 40 TABLET, DELAYED RELEASE ORAL at 06:07

## 2022-07-18 RX ADMIN — CARVEDILOL 3.12 MG: 3.12 TABLET, FILM COATED ORAL at 08:07

## 2022-07-18 RX ADMIN — MEROPENEM AND SODIUM CHLORIDE 1 G: 1 INJECTION, SOLUTION INTRAVENOUS at 10:07

## 2022-07-18 RX ADMIN — OXYCODONE HYDROCHLORIDE 10 MG: 5 TABLET ORAL at 02:07

## 2022-07-18 RX ADMIN — DIPHENHYDRAMINE HYDROCHLORIDE 12.5 MG: 50 INJECTION, SOLUTION INTRAMUSCULAR; INTRAVENOUS at 08:07

## 2022-07-18 RX ADMIN — MAGNESIUM SULFATE HEPTAHYDRATE 2 G: 40 INJECTION, SOLUTION INTRAVENOUS at 06:07

## 2022-07-18 RX ADMIN — SODIUM CHLORIDE: 0.9 INJECTION, SOLUTION INTRAVENOUS at 10:07

## 2022-07-18 RX ADMIN — MEROPENEM AND SODIUM CHLORIDE 1 G: 1 INJECTION, SOLUTION INTRAVENOUS at 05:07

## 2022-07-18 RX ADMIN — MEROPENEM AND SODIUM CHLORIDE 1 G: 1 INJECTION, SOLUTION INTRAVENOUS at 02:07

## 2022-07-18 RX ADMIN — ENOXAPARIN SODIUM 40 MG: 40 INJECTION SUBCUTANEOUS at 05:07

## 2022-07-18 RX ADMIN — OXYCODONE HYDROCHLORIDE 10 MG: 5 TABLET ORAL at 06:07

## 2022-07-18 NOTE — PT/OT/SLP PROGRESS
Chief Complaint   Patient presents with    Other     DME FOR POWER WHEELCHAIR EVALUATION     3 most recent [facility] Screens 8/12/2021   PHQ Not Done -   Little interest or pleasure in doing things Not at all   Feeling down, depressed, irritable, or hopeless Not at all   Total Score PHQ 2 0     1. Have you been to the ER, urgent care clinic since your last visit? Hospitalized since your last visit? YES    2. Have you seen or consulted any other health care providers outside of the [address] [place] [address] since your last visit? Include any pap smears or colon screening.  YES Physical Therapy Treatment    Patient Name:  Tayla Ewing   MRN:  [id number]    Recommendations:     Discharge Recommendations:  home   Discharge Equipment Recommendations: none   Barriers to discharge: increased assist with mobility    Assessment:     Tayla Ewing is a 77 y.o. female admitted with a medical diagnosis of Diverticular disease of intestine with perforation and abscess.  She presents with the following impairments/functional limitations:  weakness, impaired endurance, impaired functional mobility, pain, impaired cardiopulmonary response to activity.  Pt with HOB elevated and spouse present. Pt agreeable to visit. Pt said she had a benadryl and it was making her feel sleepy and out of it. Pt ambulated 220' with no AD and CGA with verbal cuing for pacing. Pt left sitting EOB with all needs within reach and spouse present. Pt's friends entered at end of session.    Rehab Prognosis: Fair; patient would benefit from acute skilled PT services to address these deficits and reach maximum level of function.    Recent Surgery: Procedure(s) (LRB):  COLECTOMY, SIGMOID, LAPAROSCOPIC (N/A)  SALPINGO-OOPHORECTOMY, LAPAROSCOPIC (Left)  INCISION AND DRAINAGE, ABSCESS (N/A)  CYSTOSCOPY, WITH URETERAL STENT INSERTION (N/A) 3 Days Post-Op    Plan:     During this hospitalization, patient to be seen 5 x/week to address the identified rehab impairments via gait training, therapeutic activities, therapeutic exercises and progress toward the following goals:    · Plan of Care Expires:  07/30/22    Subjective     Chief Complaint: benadryl had her feeling sleepy and out of it  Patient/Family Comments/goals: to go home  Pain/Comfort:  · Pain Rating 1: 0/10      Objective:     Communicated with RN prior to session.  Patient found HOB elevated with peripheral IV, RADHAMES drain upon PT entry to room.     General Precautions: Standard, fall   Orthopedic Precautions:    Braces:    Respiratory Status: Room air     Functional  Mobility:  · Bed Mobility:     · Supine to Sit: supervision  · Transfers:     · Sit to Stand:  stand by assistance with no AD  · Gait: x 220' with no AD and CGA for safety with VC for pacing      AM-PAC 6 CLICK MOBILITY          Therapeutic Activities and Exercises:   Pt educated on importance of time OOB, importance of intermittent mobility, safe techniques for transfers/ambulation, discharge recommendations/options, and use of call light for assistance and fall prevention.      Patient left sitting edge of bed with all lines intact, call button in reach, RN notified and spouse and friends present..    GOALS:   Multidisciplinary Problems     Physical Therapy Goals        Problem: Physical Therapy    Goal Priority Disciplines Outcome Goal Variances Interventions   Physical Therapy Goal     PT, PT/OT      Description: Goals to be met by: 2022    Patient will increase functional independence with mobility by performin. Supine to sit with Modified [place]  2. Sit to stand transfer with Modified [place]  3. Gait  x 300 feet with Modified [place]  with or without AD                     Time Tracking:     PT Received On: 22  PT Start Time: 1131     PT Stop Time: 1140  PT Total Time (min): 9 min     Billable Minutes: Gait Training 9    Treatment Type: Treatment  PT/PTA: PTA     PTA Visit Number: 1     2022

## 2022-07-18 NOTE — PLAN OF CARE
Problem: Infection  Goal: Absence of Infection Signs and Symptoms  Outcome: Ongoing, Progressing     Problem: Adult Inpatient Plan of Care  Goal: Absence of Hospital-Acquired Illness or Injury  Outcome: Ongoing, Progressing  Goal: Optimal Comfort and Wellbeing  Outcome: Ongoing, Progressing  Goal: Readiness for Transition of Care  Outcome: Ongoing, Progressing     Problem: Skin Injury Risk Increased  Goal: Skin Health and Integrity  Outcome: Ongoing, Progressing     Problem: Fall Injury Risk  Goal: Absence of Fall and Fall-Related Injury  Outcome: Met

## 2022-07-18 NOTE — PROGRESS NOTES
[facility]  General Surgery  Progress Note    Subjective:     Interval History: No acute events overnight. Feeling well. No flatus.     Post-Op Info:  Procedure(s) (LRB):  COLECTOMY, SIGMOID, LAPAROSCOPIC (N/A)  SALPINGO-OOPHORECTOMY, LAPAROSCOPIC (Left)  INCISION AND DRAINAGE, ABSCESS (N/A)  CYSTOSCOPY, WITH URETERAL STENT INSERTION (N/A)   3 Days Post-Op      Medications:  Continuous Infusions:   sodium chloride 0.9% 50 mL/hr at 07/18/22 1006     Scheduled Meds:   carvediloL  3.125 mg Oral BID    enoxaparin  40 mg Subcutaneous Daily    fluconazole (DIFLUCAN) IV (PEDS and ADULTS)  200 mg Intravenous Q24H    fluticasone propionate  1 spray Each Nostril Daily    LIDOcaine (PF) 10 mg/ml (1%)  1 mL Other Once    magnesium sulfate IVPB  4 g Intravenous Once    meropenem (MERREM) IVPB  1 g Intravenous Q8H    mupirocin  1 g Nasal BID    pantoprazole  40 mg Oral Daily     PRN Meds:acetaminophen, albuterol-ipratropium, calcium gluconate IVPB, calcium gluconate IVPB, calcium gluconate IVPB, dextrose 50%, dextrose 50%, diphenhydrAMINE, glucagon (human recombinant), hydrALAZINE, hydrALAZINE, magnesium sulfate IVPB, magnesium sulfate IVPB, melatonin, naloxone, ondansetron, oxyCODONE, oxyCODONE, potassium chloride in water **AND** potassium chloride in water **AND** potassium chloride in water, sodium chloride 0.9%, sodium chloride 0.9%, sodium phosphate IVPB, sodium phosphate IVPB, sodium phosphate IVPB     Objective:     Vital Signs (Most Recent):  Temp: 97.6 °F (36.4 °C) (07/18/22 1153)  Pulse: 95 (07/18/22 1153)  Resp: 18 (07/18/22 1153)  BP: 133/71 (07/18/22 1153)  SpO2: 96 % (07/18/22 1153) Vital Signs (24h Range):  Temp:  [97.5 °F (36.4 °C)-98.4 °F (36.9 °C)] 97.6 °F (36.4 °C)  Pulse:  [] 95  Resp:  [15-20] 18  SpO2:  [93 %-97 %] 96 %  BP: (133-162)/(63-78) 133/71       Intake/Output Summary (Last 24 hours) at 7/18/2022 1613  Last data filed at 7/18/2022 0800  Gross per 24 hour   Intake  2709.27 ml   Output 470 ml   Net 2239.27 ml       Physical Exam  Constitutional:       General: She is not in acute distress.  Pulmonary:      Effort: Pulmonary effort is normal. No respiratory distress.   Abdominal:      General: There is distension.      Palpations: Abdomen is soft.      Tenderness: There is abdominal tenderness.      Comments: Appropriately tender.  Nondistended.  RADHAMES drain serosanguineous   Neurological:      Mental Status: She is alert and oriented to person, place, and time.         Significant Labs:  CBC:   Recent Labs   Lab 07/18/22  0436   WBC 9.89   RBC 3.39*   HGB 10.0*   HCT 30.6*      MCV 90   MCH 29.5   MCHC 32.7     CMP:   Recent Labs   Lab 07/18/22  0436   *   CALCIUM 8.5*   ALBUMIN 2.6*   PROT 5.5*      K 3.5   CO2 29      BUN 10   CREATININE 0.5   ALKPHOS 52*   ALT 14   AST 12   BILITOT 0.7       Significant Diagnostics:  I have reviewed all pertinent imaging results/findings within the past 24 hours.    Assessment/Plan:     Active Diagnoses:    Diagnosis Date Noted POA    PRINCIPAL PROBLEM:  Diverticular disease of intestine with perforation and abscess [K57.80] 07/15/2022 Yes    Encounter for long-term (current) use of antibiotics [Z79.2]  Not Applicable    Ovarian mass, left [N83.8] 07/15/2022 Yes    HTN (hypertension) [I10] 07/15/2022 Yes    MS (multiple sclerosis) [G35] 12/12/2014 Yes    Prophylactic immunotherapy [Z29.8] 12/12/2014 Not Applicable      Problems Resolved During this Admission:     -Doing well. Continue liquids again today.  -PT  -IS   -Path benign  -    LEFT OVARY AND FALLOPIAN TUBE, SALPINGO-OOPHORECTOMY:        --OVARY WITH MATURE CYSTIC TERATOMA.        --FALLOPIAN TUBE WITH NO SIGNIFICANT HISTOPATHOLOGIC FINDINGS.     2.  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        --MARGINS NEGATIVE FOR DYSPLASIA.        --RUPTURED ACUTE DIVERTICULITIS.        --SEROSAL ADHESIONS.        --FOUR BENIGN LYMPH NODES.      Antonio Mott III, MD  General Surgery  [facility]

## 2022-07-18 NOTE — PROGRESS NOTES
Consult Note  Infectious Disease    Reason for Consult:  Follow-up intra-abdominal abscess from perforated diverticulitis    HPI: Tayla Ewing is a 77 y.o. female Seen at [facility] for diverticular abscess 6/2 which was a recurrence or relapse of prior perforation. She had been treated with a course of IV antibiotics at home but had recurrence. She had percutaneous drain placed and culture grew Hemophilus and pseudomonas. She was discharged with drain and on zosyn IV. She had a repeat CT on 6/11 which was about the same.  She was followed by me in the office, last seen 07/06.  She tolerated the antibiotics extremely well.  She had a follow-up CT scan of the abdomen showing a reduction in size of the primary abscess but evolution of an abscess near the dome of the bladder.  A RADHAMES drain was in place with purulent drainage through her admission yesterday.  Zosyn was continued along with oral Diflucan through the time of her admission yesterday for elective sigmoid resection.  Operative findings and procedure discussed with Dr. Mott.  Left-sided ovarian mass was resected as well.  She is now on meropenem as discussed with general surgery.  Today she has done extremely well, requiring no oxygen, walked in the rios, and no nausea or vomiting, active bowel sounds but no flatus yet.  No fever and labs are stable.    7/17: interim reviewed and all is well. She walked in the rios, [place] out, no flatus yet, pain is controlled. No antibiotic side effects.   7/18: interim reviewed. Afebrile. No detrimental changes in lab.  states no flatus yet, but walking in the rios. Awaiting return of bowel fu nction.sleeping not disturbed    EXAM & DIAGNOSTICS REVIEWED:   Vitals:     Temp:  [97.5 °F (36.4 °C)-98.4 °F (36.9 °C)]   Temp: 97.6 °F (36.4 °C) (07/18/22 1153)  Pulse: 95 (07/18/22 1153)  Resp: 18 (07/18/22 1153)  BP: 133/71 (07/18/22 1153)  SpO2: 96 % (07/18/22 1153)    Intake/Output Summary (Last 24 hours) at 7/18/2022  1409  Last data filed at 7/18/2022 0800  Gross per 24 hour   Intake 2709.27 ml   Output 470 ml   Net 2239.27 ml     exa m 7/17  General:  In NAD. Alert and attentive, cooperative, comfortable  Eyes:  Anicteric,   EOMI  ENT:  No ulcers, exudates, thrush, nares patent, dentition is good,   Neck:  supple,    Lungs: Clear, no consolidation, rales, wheezes, rub  Heart:  RRR, no gallop/murmur/rub noted  Abd:  Soft, appropriately tender, ND, normal BS, no masses or organomegaly appreciated.  RADHAMES drain in place with sanguineous output  :   Page out  Musc:  Joints without effusion, swelling, erythema, synovitis, muscle wasting.   Skin:  No rashes.   Neuro:             Alert, attentive, speech fluent, face symmetric, moves all extremities, no focal weakness. Ambulatory  Psych: Calm, cooperative     Extrem: No edema, erythema, phlebitis, cellulitis, warm and well perfused  VAD:   Left arm PICC line    Isolation:  None  Wound: Not disturbed           General Labs reviewed:  Recent Labs   Lab 07/16/22  0430 07/17/22 0447 07/18/22  0436   WBC 12.18 10.40 9.89   HGB 10.2* 8.8* 10.0*   HCT 31.9* 27.5* 30.6*    187 232       Recent Labs   Lab 07/16/22  0430 07/17/22 0447 07/18/22  0436    138 139   K 3.5 4.1 3.5    105 105   CO2 26 29 29   BUN 6* 6* 10   CREATININE 0.5 0.5 0.5   CALCIUM 8.6* 8.6* 8.5*   PROT 6.2 5.9* 5.5*   BILITOT 0.9 0.8 0.7   ALKPHOS 56 53* 52*   ALT 16 14 14   AST 15 13 12     No results for input(s): CRP in the last 168 hours.      Micro:  Microbiology Results (last 7 days)     Procedure Component Value Units Date/Time    Blood culture [[id number]] Collected: 07/15/22 2020    Order Status: Completed Specimen: Blood Updated: 07/18/22 0232     Blood Culture, Routine No Growth to date      No Growth to date      No Growth to date    Blood culture [[id number]] Collected: 07/15/22 2017    Order Status: Completed Specimen: Blood from Antecubital, Right Updated: 07/18/22 0232     Blood Culture,  Routine No Growth to date      No Growth to date      No Growth to date          Imaging Reviewed:   CXR   CT prior to admission    Cardiology:    IMPRESSION & PLAN   1.  Persistent intra abdominal abscesses status post perforated diverticulitis    2.  Status post sigmoid colon resection, drainage of small abscesses, resection of ovarian mass      Recommendations:  Continue meropenem and will Rx IV Diflucan   awaiting bowel recovery  Will follow-up  Discussed with family at the bedside  Medical Decision Making during this encounter was  [_] Low Complexity  [_] Moderate Complexity  x[  ] High Complexity

## 2022-07-18 NOTE — PROGRESS NOTES
[facility]  General Surgery  Progress Note    Subjective:     Interval History: no acute events overnight.  Awake and alert this morning.  Continues to feel well.  Afebrile.  Hemodynamically stable.  No nausea.  No flatus yet.    Post-Op Info:  Procedure(s) (LRB):  COLECTOMY, SIGMOID, LAPAROSCOPIC (N/A)  SALPINGO-OOPHORECTOMY, LAPAROSCOPIC (Left)  INCISION AND DRAINAGE, ABSCESS (N/A)  CYSTOSCOPY, WITH URETERAL STENT INSERTION (N/A)   2 Days Post-Op      Medications:  Continuous Infusions:   sodium chloride 0.9% 50 mL/hr at 07/16/22 1420     Scheduled Meds:   carvediloL  3.125 mg Oral BID    enoxaparin  40 mg Subcutaneous Daily    fluconazole (DIFLUCAN) IV (PEDS and ADULTS)  200 mg Intravenous Q24H    fluticasone propionate  1 spray Each Nostril Daily    LIDOcaine (PF) 10 mg/ml (1%)  1 mL Other Once    meropenem (MERREM) IVPB  1 g Intravenous Q8H    mupirocin  1 g Nasal BID    pantoprazole  40 mg Oral Daily     PRN Meds:acetaminophen, albuterol-ipratropium, calcium gluconate IVPB, calcium gluconate IVPB, calcium gluconate IVPB, dextrose 50%, dextrose 50%, diphenhydrAMINE, glucagon (human recombinant), hydrALAZINE, hydrALAZINE, magnesium sulfate IVPB, magnesium sulfate IVPB, melatonin, naloxone, ondansetron, oxyCODONE, oxyCODONE, potassium chloride in water **AND** potassium chloride in water **AND** potassium chloride in water, sodium chloride 0.9%, sodium chloride 0.9%, sodium phosphate IVPB, sodium phosphate IVPB, sodium phosphate IVPB     Objective:     Vital Signs (Most Recent):  Temp: 97.7 °F (36.5 °C) (07/17/22 1910)  Pulse: 105 (07/17/22 1910)  Resp: 18 (07/17/22 1910)  BP: (!) 141/68 (07/17/22 1910)  SpO2: (!) 93 % (07/17/22 1910) Vital Signs (24h Range):  Temp:  [97.4 °F (36.3 °C)-98.4 °F (36.9 °C)] 97.7 °F (36.5 °C)  Pulse:  [] 105  Resp:  [15-20] 18  SpO2:  [93 %-100 %] 93 %  BP: (117-142)/(61-74) 141/68       Intake/Output Summary (Last 24 hours) at 7/17/2022 1928  Last data  filed at 7/17/2022 1759  Gross per 24 hour   Intake 960 ml   Output 150 ml   Net 810 ml       Physical Exam  Constitutional:       General: She is not in acute distress.  Pulmonary:      Effort: Pulmonary effort is normal. No respiratory distress.   Abdominal:      General: There is distension.      Palpations: Abdomen is soft.      Tenderness: There is abdominal tenderness.      Comments: Appropriately tender.  Nondistended.  RADHAMES drain serosanguineous   Neurological:      Mental Status: She is alert and oriented to person, place, and time.         Significant Labs:  CBC:   Recent Labs   Lab 07/17/22 0447   WBC 10.40   RBC 2.93*   HGB 8.8*   HCT 27.5*      MCV 94   MCH 30.0   MCHC 32.0     CMP:   Recent Labs   Lab 07/17/22 0447      CALCIUM 8.6*   ALBUMIN 2.8*   PROT 5.9*      K 4.1   CO2 29      BUN 6*   CREATININE 0.5   ALKPHOS 53*   ALT 14   AST 13   BILITOT 0.8       Significant Diagnostics:  I have reviewed all pertinent imaging results/findings within the past 24 hours.    Assessment/Plan:     Active Diagnoses:    Diagnosis Date Noted POA    PRINCIPAL PROBLEM:  Diverticular disease of intestine with perforation and abscess [K57.80] 07/15/2022 Yes    Encounter for long-term (current) use of antibiotics [Z79.2]  Not Applicable    Ovarian mass, left [N83.8] 07/15/2022 Yes    HTN (hypertension) [I10] 07/15/2022 Yes    MS (multiple sclerosis) [G35] 12/12/2014 Yes    Prophylactic immunotherapy [Z29.8] 12/12/2014 Not Applicable      Problems Resolved During this Admission:   -doing well so far postop day 2.   -continue liquid diet.  -PT  -DVT prophylaxis    Antonio Mott III, MD  General Surgery  [facility]

## 2022-07-18 NOTE — PROGRESS NOTES
"[facility] Medicine Progress Note  Patient Name: Tayla Ewing MRN: [id number]   Patient Class: IP- Inpatient  Length of Stay: 3   Admission Date: 7/15/2022  5:29 AM Attending Physician: Jaime Willis MD   Primary Care Provider: Marcel Mueller MD Face-to-Face encounter date: 07/18/2022   Chief Complaint: No chief complaint on file.      Subjective:    Interval History   Much better with oral pain medicines  Only complains of pain while she walks  Still not passing gas, just belching.   Denies chest pain, shortness of breath, palpitations, nausea/vomiting.   No concerns/issues overnight reported by the patient or the nursing staff.  Reviewed the labs and discussed the plan of care.   Discussed plan of care with family    Review of Systems   All other Review of Systems were found to be negative expect for that mentioned already in HPI.     Objective:   Physical Exam  /63   Pulse 81   Temp 97.5 °F (36.4 °C) (Oral)   Resp 18   Ht 5' 3" (1.6 m)   Wt 67.2 kg (148 lb 2.4 oz)   SpO2 (!) 94%   Breastfeeding No   BMI 26.24 kg/m²   Constitutional: No distress.   HENT: Atraumatic.   Cardiovascular: Normal rate, regular rhythm and normal heart sounds.   Pulmonary/Chest: Effort normal. Clear to auscultation bilaterally. No wheezes.   Abdominal: Soft. Bowel sounds are normal. Exhibits no distension and no mass. Abdominal tenderness present  Neurological: Alert.   Skin: Skin is warm and dry.     Labs and Imaging    Significant Labs: All pertinent labs within the past 24 hours have been reviewed.    Significant Imaging: I have reviewed all pertinent imaging results/findings within the past 24 hours.    I have reviewed the Vitals, labs and imaging as above.     Assessment & Plan:   Tayla Ewing is a 77 y.o. female admitted for    Diverticular disease of intestine with perforation and abscess  Ovarian mass s/p Salpingo-oophorectomy   S/p ureteral stenting   Hypomagnesemia- " replaced    Plan  Continue current management  Antibiotics as per ID  Diet as per surgery  Ambulate, PT/OT    Core measures:  - Code status: full code   - Diet: as per surgery  VTE Risk Mitigation (From admission, onward)         Ordered     enoxaparin injection 40 mg  Daily         07/16/22 1420     IP VTE HIGH RISK PATIENT  Once         07/1945     Place sequential compression device  Until discontinued         07/1945                Discharge Planning:   Discharge Planning   JAYSHREE: 07/20    Code Status: Full Code   Is the patient medically ready for discharge?: No    Reason for patient still in hospital (select all that apply): Patient trending condition  Discharge Plan A: Home with assistance from family        Above encounter included review of the medical records, interviewing and examining the patient face-to-face, discussion with family and other health care providers, ordering and interpreting lab/test results and formulating a plan of care.     Medical Decision Making:  [] Low Complexity  [x] Moderate Complexity  [] High Complexity    Jaime Willis MD  [facility] Hospitalist  07/18/2022

## 2022-07-18 NOTE — CARE UPDATE
07/17/22 2006   Patient Assessment/Suction   Level of Consciousness (AVPU) alert   Respiratory Effort Normal;Unlabored   Expansion/Accessory Muscles/Retractions no use of accessory muscles   All Lung Fields Breath Sounds clear   Cough Frequency infrequent   Cough Type nonproductive   PRE-TX-O2   O2 Device (Oxygen Therapy) room air   SpO2 95 %   Pulse Oximetry Type Intermittent   $ Pulse Oximetry - Multiple Charge Pulse Oximetry - Multiple   Pulse 100   Resp 20   ETCO2   $ ETCO2 Usage Other (see comments)  (D/C at this time)   Incentive Spirometer   $ Incentive Spirometer Charges postop instruction   Incentive Spirometer Predicted Level (mL) 2000   Administration (IS) instruction provided, initial   Number of Repetitions (IS) 12   Level Incentive Spirometer (mL) 1750   Patient Tolerance (IS) good   Encourage incentive spirometer

## 2022-07-19 LAB
ALBUMIN SERPL BCP-MCNC: 2.5 G/DL (ref 3.5–5.2)
ALP SERPL-CCNC: 50 U/L (ref 55–135)
ALT SERPL W/O P-5'-P-CCNC: 14 U/L (ref 10–44)
ANION GAP SERPL CALC-SCNC: 8 MMOL/L (ref 8–16)
AST SERPL-CCNC: 16 U/L (ref 10–40)
BASOPHILS # BLD AUTO: 0.03 K/UL (ref 0–0.2)
BASOPHILS NFR BLD: 0.4 % (ref 0–1.9)
BILIRUB SERPL-MCNC: 0.5 MG/DL (ref 0.1–1)
BUN SERPL-MCNC: 10 MG/DL (ref 8–23)
CALCIUM SERPL-MCNC: 8.7 MG/DL (ref 8.7–10.5)
CHLORIDE SERPL-SCNC: 104 MMOL/L (ref 95–110)
CO2 SERPL-SCNC: 28 MMOL/L (ref 23–29)
CREAT SERPL-MCNC: 0.5 MG/DL (ref 0.5–1.4)
DIFFERENTIAL METHOD: ABNORMAL
EOSINOPHIL # BLD AUTO: 0.4 K/UL (ref 0–0.5)
EOSINOPHIL NFR BLD: 4.6 % (ref 0–8)
ERYTHROCYTE [DISTWIDTH] IN BLOOD BY AUTOMATED COUNT: 14.8 % (ref 11.5–14.5)
EST. GFR  (AFRICAN AMERICAN): >60 ML/MIN/1.73 M^2
EST. GFR  (NON AFRICAN AMERICAN): >60 ML/MIN/1.73 M^2
GLUCOSE SERPL-MCNC: 107 MG/DL (ref 70–110)
HCT VFR BLD AUTO: 28.3 % (ref 37–48.5)
HGB BLD-MCNC: 9.2 G/DL (ref 12–16)
IMM GRANULOCYTES # BLD AUTO: 0.03 K/UL (ref 0–0.04)
IMM GRANULOCYTES NFR BLD AUTO: 0.4 % (ref 0–0.5)
LYMPHOCYTES # BLD AUTO: 2.2 K/UL (ref 1–4.8)
LYMPHOCYTES NFR BLD: 28.6 % (ref 18–48)
MAGNESIUM SERPL-MCNC: 1.7 MG/DL (ref 1.6–2.6)
MCH RBC QN AUTO: 29.8 PG (ref 27–31)
MCHC RBC AUTO-ENTMCNC: 32.5 G/DL (ref 32–36)
MCV RBC AUTO: 92 FL (ref 82–98)
MONOCYTES # BLD AUTO: 0.6 K/UL (ref 0.3–1)
MONOCYTES NFR BLD: 7.5 % (ref 4–15)
NEUTROPHILS # BLD AUTO: 4.5 K/UL (ref 1.8–7.7)
NEUTROPHILS NFR BLD: 58.5 % (ref 38–73)
NRBC BLD-RTO: 0 /100 WBC
PLATELET # BLD AUTO: 220 K/UL (ref 150–450)
PMV BLD AUTO: 9.3 FL (ref 9.2–12.9)
POTASSIUM SERPL-SCNC: 3.6 MMOL/L (ref 3.5–5.1)
PROT SERPL-MCNC: 5.7 G/DL (ref 6–8.4)
RBC # BLD AUTO: 3.09 M/UL (ref 4–5.4)
SODIUM SERPL-SCNC: 140 MMOL/L (ref 136–145)
WBC # BLD AUTO: 7.62 K/UL (ref 3.9–12.7)

## 2022-07-19 PROCEDURE — 25000003 PHARM REV CODE 250: Performed by: FAMILY MEDICINE

## 2022-07-19 PROCEDURE — 63600175 PHARM REV CODE 636 W HCPCS: Performed by: INTERNAL MEDICINE

## 2022-07-19 PROCEDURE — 12000002 HC ACUTE/MED SURGE SEMI-PRIVATE ROOM

## 2022-07-19 PROCEDURE — 99231 PR SUBSEQUENT HOSPITAL CARE,LEVL I: ICD-10-PCS | Mod: ,,, | Performed by: INTERNAL MEDICINE

## 2022-07-19 PROCEDURE — 99231 SBSQ HOSP IP/OBS SF/LOW 25: CPT | Mod: ,,, | Performed by: INTERNAL MEDICINE

## 2022-07-19 PROCEDURE — 83735 ASSAY OF MAGNESIUM: CPT | Performed by: FAMILY MEDICINE

## 2022-07-19 PROCEDURE — 99900035 HC TECH TIME PER 15 MIN (STAT)

## 2022-07-19 PROCEDURE — 97116 GAIT TRAINING THERAPY: CPT | Mod: CQ

## 2022-07-19 PROCEDURE — 99900031 HC PATIENT EDUCATION (STAT)

## 2022-07-19 PROCEDURE — 25000003 PHARM REV CODE 250: Performed by: ANESTHESIOLOGY

## 2022-07-19 PROCEDURE — 85025 COMPLETE CBC W/AUTO DIFF WBC: CPT | Performed by: FAMILY MEDICINE

## 2022-07-19 PROCEDURE — 25000003 PHARM REV CODE 250: Performed by: SURGERY

## 2022-07-19 PROCEDURE — 80053 COMPREHEN METABOLIC PANEL: CPT | Performed by: FAMILY MEDICINE

## 2022-07-19 PROCEDURE — 63600175 PHARM REV CODE 636 W HCPCS: Performed by: SURGERY

## 2022-07-19 RX ADMIN — SODIUM CHLORIDE: 0.9 INJECTION, SOLUTION INTRAVENOUS at 05:07

## 2022-07-19 RX ADMIN — MUPIROCIN 1 G: 20 OINTMENT TOPICAL at 08:07

## 2022-07-19 RX ADMIN — CARVEDILOL 3.12 MG: 3.12 TABLET, FILM COATED ORAL at 08:07

## 2022-07-19 RX ADMIN — MEROPENEM AND SODIUM CHLORIDE 1 G: 1 INJECTION, SOLUTION INTRAVENOUS at 05:07

## 2022-07-19 RX ADMIN — MUPIROCIN 1 G: 20 OINTMENT TOPICAL at 09:07

## 2022-07-19 RX ADMIN — CARVEDILOL 3.12 MG: 3.12 TABLET, FILM COATED ORAL at 09:07

## 2022-07-19 RX ADMIN — MEROPENEM AND SODIUM CHLORIDE 1 G: 1 INJECTION, SOLUTION INTRAVENOUS at 01:07

## 2022-07-19 RX ADMIN — ENOXAPARIN SODIUM 40 MG: 40 INJECTION SUBCUTANEOUS at 04:07

## 2022-07-19 RX ADMIN — OXYCODONE HYDROCHLORIDE 10 MG: 5 TABLET ORAL at 09:07

## 2022-07-19 RX ADMIN — MEROPENEM AND SODIUM CHLORIDE 1 G: 1 INJECTION, SOLUTION INTRAVENOUS at 10:07

## 2022-07-19 RX ADMIN — FLUCONAZOLE 200 MG: 2 INJECTION, SOLUTION INTRAVENOUS at 11:07

## 2022-07-19 RX ADMIN — PANTOPRAZOLE SODIUM 40 MG: 40 TABLET, DELAYED RELEASE ORAL at 05:07

## 2022-07-19 NOTE — PT/OT/SLP PROGRESS
Physical Therapy Treatment    Patient Name:  Tayla Ewing   MRN:  [id number]    Recommendations:     Discharge Recommendations:  home   Discharge Equipment Recommendations: none   Barriers to discharge: increased assist with mobility    Assessment:     Tayla Ewing is a 77 y.o. female admitted with a medical diagnosis of Diverticular disease of intestine with perforation and abscess.  She presents with the following impairments/functional limitations:  weakness, impaired endurance, impaired functional mobility, pain, impaired cardiopulmonary response to activity.  Pt with HOB elevated and spouse present. Pt agreeable to visit. Pt ambulated 220' with no AD and SBA with verbal cuing for pacing. Pt left sitting EOB with all needs within reach.    Rehab Prognosis: Fair; patient would benefit from acute skilled PT services to address these deficits and reach maximum level of function.    Recent Surgery: Procedure(s) (LRB):  COLECTOMY, SIGMOID, LAPAROSCOPIC (N/A)  SALPINGO-OOPHORECTOMY, LAPAROSCOPIC (Left)  INCISION AND DRAINAGE, ABSCESS (N/A)  CYSTOSCOPY, WITH URETERAL STENT INSERTION (N/A) 4 Days Post-Op    Plan:     During this hospitalization, patient to be seen 5 x/week to address the identified rehab impairments via gait training, therapeutic activities, therapeutic exercises and progress toward the following goals:    · Plan of Care Expires:  07/30/22    Subjective     Chief Complaint: some pain in abdomen  Patient/Family Comments/goals: to get better  Pain/Comfort:  · Pain Rating 1: other (see comments) (not rated)  · Location - Orientation 1: generalized  · Location 1: abdomen  · Pain Addressed 1: Reposition, Distraction, Cessation of Activity  · Pain Rating Post-Intervention 1: other (see comments) (not rated)      Objective:     Communicated with RN prior to session.  Patient found HOB elevated with peripheral IV, RADHAMES drain upon PT entry to room.     General Precautions: Standard, fall   Orthopedic  Reviewed information received from the RN. Bailey from EP standpoint if psych wants to increase patient's ativan dose (EP will not be the ones to prescribe/increase the dose). Patient's wife should not be increasing the ativan dose unless directed to do so by psych.      Precautions:    Braces:    Respiratory Status: Room air     Functional Mobility:  · Bed Mobility:     · Supine to Sit: independence  · Transfers:     · Sit to Stand:  supervision with no AD  · Gait: x 220' with no AD and SBA with VC for pacing      AM-PAC 6 CLICK MOBILITY          Therapeutic Activities and Exercises:   Pt educated on importance of time OOB, importance of intermittent mobility, safe techniques for transfers/ambulation, discharge recommendations/options, and use of call light for assistance and fall prevention.      Patient left sitting edge of bed with all lines intact, call button in reach, RN notified and spouse present..    GOALS:   Multidisciplinary Problems     Physical Therapy Goals        Problem: Physical Therapy    Goal Priority Disciplines Outcome Goal Variances Interventions   Physical Therapy Goal     PT, PT/OT      Description: Goals to be met by: 2022    Patient will increase functional independence with mobility by performin. Supine to sit with Modified Sac  2. Sit to stand transfer with Modified Sac  3. Gait  x 300 feet with Modified Sac  with or without AD                     Time Tracking:     PT Received On: 22  PT Start Time: 925     PT Stop Time: 936  PT Total Time (min): 11 min     Billable Minutes: Gait Training 11    Treatment Type: Treatment  PT/PTA: PTA     PTA Visit Number: 2     2022

## 2022-07-19 NOTE — PROGRESS NOTES
Consult Note  Infectious Disease    Reason for Consult:  Follow-up intra-abdominal abscess from perforated diverticulitis    HPI: Tayla Ewing is a 77 y.o. female Seen at [facility] for diverticular abscess 6/2 which was a recurrence or relapse of prior perforation. She had been treated with a course of IV antibiotics at home but had recurrence. She had percutaneous drain placed and culture grew Hemophilus and pseudomonas. She was discharged with drain and on zosyn IV. She had a repeat CT on 6/11 which was about the same.  She was followed by me in the office, last seen 07/06.  She tolerated the antibiotics extremely well.  She had a follow-up CT scan of the abdomen showing a reduction in size of the primary abscess but evolution of an abscess near the dome of the bladder.  A RADHAMES drain was in place with purulent drainage through her admission yesterday.  Zosyn was continued along with oral Diflucan through the time of her admission yesterday for elective sigmoid resection.  Operative findings and procedure discussed with Dr. Mott.  Left-sided ovarian mass was resected as well.  She is now on meropenem as discussed with general surgery.  Today she has done extremely well, requiring no oxygen, walked in the rios, and no nausea or vomiting, active bowel sounds but no flatus yet.  No fever and labs are stable.    7/17: interim reviewed and all is well. She walked in the rios, juarez out, no flatus yet, pain is controlled. No antibiotic side effects.   7/18: interim reviewed. Afebrile. No detrimental changes in lab.  states no flatus yet, but walking in the rios. Awaiting return of bowel fu nction.sleeping not disturbed  7/19:  Interim reviewed.  Pathology reviewed.  Passing flatus and had 2 BM's.  Diet has been advanced.    EXAM & DIAGNOSTICS REVIEWED:   Vitals:     Temp:  [97.4 °F (36.3 °C)-97.9 °F (36.6 °C)]   Temp: 97.9 °F (36.6 °C) (07/19/22 1145)  Pulse: 90 (07/19/22 1145)  Resp: 18 (07/19/22 1145)  BP: (!)  151/82 (07/19/22 1145)  SpO2: 96 % (07/19/22 1145)    Intake/Output Summary (Last 24 hours) at 7/19/2022 1403  Last data filed at 7/19/2022 0900  Gross per 24 hour   Intake 1320 ml   Output 285 ml   Net 1035 ml        General:  In NAD. Alert and attentive, cooperative, comfortable  Eyes:  Anicteric,   EOMI  ENT:  No ulcers, exudates, thrush, nares patent, dentition is good,   Neck:  supple,    Lungs: Clear, no consolidation, rales, wheezes, rub  Heart:  RRR, no gallop/murmur/rub noted  Abd:  Soft, appropriately tender, ND, normal BS, no masses or organomegaly appreciated.  RADHAMES drain in place with sanguineous output  :   Page out  Musc:  Joints without effusion, swelling, erythema, synovitis, muscle wasting.   Skin:  No rashes.   Neuro:             Alert, attentive, speech fluent, face symmetric, moves all extremities, no focal weakness. Ambulatory  Psych: Calm, cooperative     Extrem:    VAD:   Left arm PICC line    Isolation:  None  Wound: Not disturbed           General Labs reviewed:  Recent Labs   Lab 07/17/22 0447 07/18/22  0436 07/19/22  0421   WBC 10.40 9.89 7.62   HGB 8.8* 10.0* 9.2*   HCT 27.5* 30.6* 28.3*    232 220       Recent Labs   Lab 07/17/22 0447 07/18/22  0436 07/19/22  0421    139 140   K 4.1 3.5 3.6    105 104   CO2 29 29 28   BUN 6* 10 10   CREATININE 0.5 0.5 0.5   CALCIUM 8.6* 8.5* 8.7   PROT 5.9* 5.5* 5.7*   BILITOT 0.8 0.7 0.5   ALKPHOS 53* 52* 50*   ALT 14 14 14   AST 13 12 16     No results for input(s): CRP in the last 168 hours.      Micro:  Microbiology Results (last 7 days)     Procedure Component Value Units Date/Time    Blood culture [[id number]] Collected: 07/15/22 2017    Order Status: Completed Specimen: Blood from Antecubital, Right Updated: 07/19/22 0232     Blood Culture, Routine No Growth to date      No Growth to date      No Growth to date      No Growth to date    Blood culture [[id number]] Collected: 07/15/22 2020    Order Status: Completed Specimen:  Blood Updated: 07/19/22 0232     Blood Culture, Routine No Growth to date      No Growth to date      No Growth to date      No Growth to date          Imaging Reviewed:   CXR   CT prior to admission    Cardiology:    IMPRESSION & PLAN   1.  Persistent intra abdominal abscesses status post perforated diverticulitis    2.  Status post sigmoid colon resection, drainage of small abscesses, resection of ovarian mass(cystic teratoma)      Recommendations:  Continue meropenem and will Rx IV Diflucan  At discharge, would follow with 5 days of cipro  Can remove picc at discharge     Medical Decision Making during this encounter was  [_] Low Complexity  [_] Moderate Complexity  x[  ] High Complexity

## 2022-07-19 NOTE — PROGRESS NOTES
"[facility] Medicine Progress Note  Patient Name: Tayla Ewing MRN: [id number]   Patient Class: IP- Inpatient  Length of Stay: 4   Admission Date: 7/15/2022  5:29 AM Attending Physician: Jaime Willis MD   Primary Care Provider: Marcel Mueller MD Face-to-Face encounter date: 07/19/2022   Chief Complaint: No chief complaint on file.      Subjective:    Interval History   No new issues  Tolerating liquid diet  Passing gas.  Denies chest pain, shortness of breath, palpitations, nausea/vomiting.   No concerns/issues overnight reported by the patient or the nursing staff.  Reviewed the labs and discussed the plan of care.   Discussed plan of care with family    Review of Systems   All other Review of Systems were found to be negative expect for that mentioned already in HPI.     Objective:   Physical Exam  BP (!) 151/82 (BP Location: Right arm, Patient Position: Sitting)   Pulse 90   Temp 97.9 °F (36.6 °C) (Oral)   Resp 18   Ht 5' 3" (1.6 m)   Wt 67.2 kg (148 lb 2.4 oz)   SpO2 96%   Breastfeeding No   BMI 26.24 kg/m²   Constitutional: No distress.   HENT: Atraumatic.   Cardiovascular: Normal rate, regular rhythm and normal heart sounds.   Pulmonary/Chest: Effort normal. Clear to auscultation bilaterally. No wheezes.   Abdominal: Soft. Bowel sounds are normal. Exhibits no distension and no mass. Abdominal tenderness present  Neurological: Alert.   Skin: Skin is warm and dry.     Labs and Imaging    Significant Labs: All pertinent labs within the past 24 hours have been reviewed.    Significant Imaging: I have reviewed all pertinent imaging results/findings within the past 24 hours.    I have reviewed the Vitals, labs and imaging as above.     Assessment & Plan:   Tayla Ewing is a 77 y.o. female admitted for    Diverticular disease of intestine with perforation and abscess  Ovarian mass s/p Salpingo-oophorectomy   S/p ureteral stenting   Hypomagnesemia- replaced    Plan  Continue " current management  Antibiotics as per ID  Diet as per surgery  Ambulate, PT/OT    Core measures:  - Code status: full code   - Diet: as per surgery  VTE Risk Mitigation (From admission, onward)         Ordered     enoxaparin injection 40 mg  Daily         07/16/22 1420     IP VTE HIGH RISK PATIENT  Once         07/1945     Place sequential compression device  Until discontinued         07/1945                Discharge Planning:   Discharge Planning   JAYSHREE: 07/20    Code Status: Full Code   Is the patient medically ready for discharge?: No    Reason for patient still in hospital (select all that apply): Patient trending condition  Discharge Plan A: Home with assistance from family        Above encounter included review of the medical records, interviewing and examining the patient face-to-face, discussion with family and other health care providers, ordering and interpreting lab/test results and formulating a plan of care.     Medical Decision Making:  [] Low Complexity  [x] Moderate Complexity  [] High Complexity    Jaime Willis MD  [facility] Hospitalist  07/19/2022

## 2022-07-19 NOTE — PROGRESS NOTES
[facility]  General Surgery  Progress Note    Subjective:     Interval History: started passing flatus overnight. No new complaints. afebrile.     Post-Op Info:  Procedure(s) (LRB):  COLECTOMY, SIGMOID, LAPAROSCOPIC (N/A)  SALPINGO-OOPHORECTOMY, LAPAROSCOPIC (Left)  INCISION AND DRAINAGE, ABSCESS (N/A)  CYSTOSCOPY, WITH URETERAL STENT INSERTION (N/A)   4 Days Post-Op      Medications:  Continuous Infusions:   sodium chloride 0.9% 50 mL/hr at 07/18/22 1006     Scheduled Meds:   carvediloL  3.125 mg Oral BID    enoxaparin  40 mg Subcutaneous Daily    fluconazole (DIFLUCAN) IV (PEDS and ADULTS)  200 mg Intravenous Q24H    fluticasone propionate  1 spray Each Nostril Daily    LIDOcaine (PF) 10 mg/ml (1%)  1 mL Other Once    magnesium sulfate IVPB  4 g Intravenous Once    meropenem (MERREM) IVPB  1 g Intravenous Q8H    mupirocin  1 g Nasal BID    pantoprazole  40 mg Oral Daily     PRN Meds:acetaminophen, albuterol-ipratropium, calcium gluconate IVPB, calcium gluconate IVPB, calcium gluconate IVPB, dextrose 50%, dextrose 50%, diphenhydrAMINE, glucagon (human recombinant), hydrALAZINE, hydrALAZINE, magnesium sulfate IVPB, magnesium sulfate IVPB, melatonin, naloxone, ondansetron, oxyCODONE, oxyCODONE, potassium chloride in water **AND** potassium chloride in water **AND** potassium chloride in water, sodium chloride 0.9%, sodium phosphate IVPB, sodium phosphate IVPB, sodium phosphate IVPB     Objective:     Vital Signs (Most Recent):  Temp: 97.7 °F (36.5 °C) (07/19/22 0706)  Pulse: 91 (07/19/22 0706)  Resp: 18 (07/19/22 0706)  BP: (!) 167/81 (07/19/22 0706)  SpO2: 97 % (07/19/22 0706) Vital Signs (24h Range):  Temp:  [97.4 °F (36.3 °C)-97.9 °F (36.6 °C)] 97.7 °F (36.5 °C)  Pulse:  [80-95] 91  Resp:  [18] 18  SpO2:  [96 %-97 %] 97 %  BP: (127-167)/(67-81) 167/81       Intake/Output Summary (Last 24 hours) at 7/19/2022 0836  Last data filed at 7/19/2022 0523  Gross per 24 hour   Intake 1320 ml    Output 245 ml   Net 1075 ml       Physical Exam  Constitutional:       General: She is not in acute distress.  Pulmonary:      Effort: Pulmonary effort is normal. No respiratory distress.   Abdominal:      General: There is distension.      Palpations: Abdomen is soft.      Tenderness: There is abdominal tenderness.      Comments: Appropriately tender.  Nondistended. incision is clean dry and intact   RADHAMES drain serosanguineous   Neurological:      Mental Status: She is alert and oriented to person, place, and time.         Significant Labs:  CBC:   Recent Labs   Lab 07/19/22 0421   WBC 7.62   RBC 3.09*   HGB 9.2*   HCT 28.3*      MCV 92   MCH 29.8   MCHC 32.5     CMP:   Recent Labs   Lab 07/19/22 0421      CALCIUM 8.7   ALBUMIN 2.5*   PROT 5.7*      K 3.6   CO2 28      BUN 10   CREATININE 0.5   ALKPHOS 50*   ALT 14   AST 16   BILITOT 0.5       Significant Diagnostics:  I have reviewed all pertinent imaging results/findings within the past 24 hours.    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 Will start planning discharge for the next day or so if continuing to do well.   -Will advance diet  -PT  -IS      Antonio Mott III, MD  General Surgery  [facility]

## 2022-07-20 VITALS
WEIGHT: 148.13 LBS | BODY MASS INDEX: 26.25 KG/M2 | HEIGHT: 63 IN | DIASTOLIC BLOOD PRESSURE: 73 MMHG | RESPIRATION RATE: 17 BRPM | SYSTOLIC BLOOD PRESSURE: 163 MMHG | HEART RATE: 76 BPM | OXYGEN SATURATION: 99 % | TEMPERATURE: 98 F

## 2022-07-20 LAB
ALBUMIN SERPL BCP-MCNC: 2.6 G/DL (ref 3.5–5.2)
ALP SERPL-CCNC: 66 U/L (ref 55–135)
ALT SERPL W/O P-5'-P-CCNC: 64 U/L (ref 10–44)
ANION GAP SERPL CALC-SCNC: 8 MMOL/L (ref 8–16)
AST SERPL-CCNC: 65 U/L (ref 10–40)
BASOPHILS # BLD AUTO: 0.04 K/UL (ref 0–0.2)
BASOPHILS NFR BLD: 0.7 % (ref 0–1.9)
BILIRUB SERPL-MCNC: 0.7 MG/DL (ref 0.1–1)
BUN SERPL-MCNC: 8 MG/DL (ref 8–23)
CALCIUM SERPL-MCNC: 8.4 MG/DL (ref 8.7–10.5)
CHLORIDE SERPL-SCNC: 102 MMOL/L (ref 95–110)
CO2 SERPL-SCNC: 25 MMOL/L (ref 23–29)
CREAT SERPL-MCNC: 0.4 MG/DL (ref 0.5–1.4)
DIFFERENTIAL METHOD: ABNORMAL
EOSINOPHIL # BLD AUTO: 0.3 K/UL (ref 0–0.5)
EOSINOPHIL NFR BLD: 4.2 % (ref 0–8)
ERYTHROCYTE [DISTWIDTH] IN BLOOD BY AUTOMATED COUNT: 14.7 % (ref 11.5–14.5)
EST. GFR  (AFRICAN AMERICAN): >60 ML/MIN/1.73 M^2
EST. GFR  (NON AFRICAN AMERICAN): >60 ML/MIN/1.73 M^2
GLUCOSE SERPL-MCNC: 99 MG/DL (ref 70–110)
HCT VFR BLD AUTO: 26.4 % (ref 37–48.5)
HGB BLD-MCNC: 8.5 G/DL (ref 12–16)
IMM GRANULOCYTES # BLD AUTO: 0.02 K/UL (ref 0–0.04)
IMM GRANULOCYTES NFR BLD AUTO: 0.3 % (ref 0–0.5)
LYMPHOCYTES # BLD AUTO: 1.7 K/UL (ref 1–4.8)
LYMPHOCYTES NFR BLD: 28.4 % (ref 18–48)
MAGNESIUM SERPL-MCNC: 1.7 MG/DL (ref 1.6–2.6)
MCH RBC QN AUTO: 29.4 PG (ref 27–31)
MCHC RBC AUTO-ENTMCNC: 32.2 G/DL (ref 32–36)
MCV RBC AUTO: 91 FL (ref 82–98)
MONOCYTES # BLD AUTO: 0.6 K/UL (ref 0.3–1)
MONOCYTES NFR BLD: 10.7 % (ref 4–15)
NEUTROPHILS # BLD AUTO: 3.3 K/UL (ref 1.8–7.7)
NEUTROPHILS NFR BLD: 55.7 % (ref 38–73)
NRBC BLD-RTO: 0 /100 WBC
PLATELET # BLD AUTO: 208 K/UL (ref 150–450)
PMV BLD AUTO: 9.1 FL (ref 9.2–12.9)
POTASSIUM SERPL-SCNC: 3.4 MMOL/L (ref 3.5–5.1)
PROT SERPL-MCNC: 5.5 G/DL (ref 6–8.4)
RBC # BLD AUTO: 2.89 M/UL (ref 4–5.4)
SODIUM SERPL-SCNC: 135 MMOL/L (ref 136–145)
WBC # BLD AUTO: 5.98 K/UL (ref 3.9–12.7)

## 2022-07-20 PROCEDURE — 99900035 HC TECH TIME PER 15 MIN (STAT)

## 2022-07-20 PROCEDURE — 63600175 PHARM REV CODE 636 W HCPCS: Performed by: INTERNAL MEDICINE

## 2022-07-20 PROCEDURE — 83735 ASSAY OF MAGNESIUM: CPT | Performed by: FAMILY MEDICINE

## 2022-07-20 PROCEDURE — 99900031 HC PATIENT EDUCATION (STAT)

## 2022-07-20 PROCEDURE — 80053 COMPREHEN METABOLIC PANEL: CPT | Performed by: FAMILY MEDICINE

## 2022-07-20 PROCEDURE — 25000003 PHARM REV CODE 250: Performed by: FAMILY MEDICINE

## 2022-07-20 PROCEDURE — 94761 N-INVAS EAR/PLS OXIMETRY MLT: CPT

## 2022-07-20 PROCEDURE — 97116 GAIT TRAINING THERAPY: CPT

## 2022-07-20 PROCEDURE — 85025 COMPLETE CBC W/AUTO DIFF WBC: CPT | Performed by: FAMILY MEDICINE

## 2022-07-20 PROCEDURE — 25000003 PHARM REV CODE 250: Performed by: SURGERY

## 2022-07-20 RX ORDER — CIPROFLOXACIN 500 MG/1
500 TABLET ORAL 2 TIMES DAILY
Qty: 10 TABLET | Refills: 0 | Status: SHIPPED | OUTPATIENT
Start: 2022-07-20 | End: 2022-07-25

## 2022-07-20 RX ORDER — HYDROCODONE BITARTRATE AND ACETAMINOPHEN 5; 325 MG/1; MG/1
1 TABLET ORAL EVERY 8 HOURS PRN
Qty: 15 TABLET | Refills: 0 | Status: SHIPPED | OUTPATIENT
Start: 2022-07-20 | End: 2022-07-25

## 2022-07-20 RX ADMIN — PANTOPRAZOLE SODIUM 40 MG: 40 TABLET, DELAYED RELEASE ORAL at 05:07

## 2022-07-20 RX ADMIN — POTASSIUM CHLORIDE 60 MEQ: 200 INJECTION, SOLUTION INTRAVENOUS at 08:07

## 2022-07-20 RX ADMIN — MEROPENEM AND SODIUM CHLORIDE 1 G: 1 INJECTION, SOLUTION INTRAVENOUS at 02:07

## 2022-07-20 RX ADMIN — CARVEDILOL 3.12 MG: 3.12 TABLET, FILM COATED ORAL at 08:07

## 2022-07-20 NOTE — DISCHARGE SUMMARY
[facility]  Discharge Summary  Patient Name: Tayla Ewing MRN: [id number]   Patient Class: IP- Inpatient  Length of Stay: 5   Admission Date: 7/15/2022  5:29 AM Attending Physician: Jaime Willis MD   Primary Care Provider: Marcel Mueller MD Face-to-Face encounter date: 07/20/2022   Chief Complaint: No chief complaint on file.    Date of Discharge: 7/20/2022  Discharge Disposition:Home or Self Care   Condition: Stable       Reason for Hospitalization     Diverticular disease of intestine with perforation and abscess  Ovarian mass s/p Salpingo-oophorectomy   S/p ureteral stenting   Hypomagnesemia    Patient Active Problem List   Diagnosis    MS (multiple sclerosis)    Prophylactic immunotherapy    Muscle spasm    Counseling regarding goals of care    Neurogenic bladder    Neuropathic pain    Vitamin D insufficiency    History of heart attack    History of heart artery stent    Diverticulitis    Intra-abdominal abscess    History of diverticulitis of colon    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 female who  has a past medical history of Back pain, Diverticulitis of colon with perforation (04/05/2022), History of heart artery stent (09/06/2018), History of heart attack (09/06/2018), Hypertension, Intra-abdominal abscess (05/31/2022), Multiple sclerosis, Neck pain, Neurogenic bladder (06/24/2015), and TMJ (dislocation of temporomandibular joint).. The patient presented to [facility] on 7/15/2022 with a primary complaint of No chief complaint on file.  .     For the full HPI please refer to the History & Physical from this admission.    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 Patient did really well after surgery. Diet was advanced slowly. Patient has tolerated diet and is having bowel movements. ID consulted for antibiotics management and she was given meropenem and IV diflucan which were deescalated at the time of discharge to PO Ciprofloxacin for 5 days. She is going to follow up with surgery as directed.     Patient was seen and examined on the date of discharge and determined to be suitable for discharge.    Physical Exam  /71   Pulse 96   Temp 97.2 °F (36.2 °C)   Resp 15   Ht 5' 3" (1.6 m)   Wt 67.2 kg (148 lb 2.4 oz)   SpO2 97%   Breastfeeding No   BMI 26.24 kg/m²   Vitals reviewed.    Constitutional: No distress.   HENT: Atraumatic.   Cardiovascular: Normal rate, regular rhythm and normal heart sounds.   Pulmonary/Chest: Effort normal. Clear to auscultation bilaterally. No wheezes.   Abdominal: Soft. Bowel sounds are normal. Exhibits no distension and no mass. No tenderness  Neurological: Alert.   Skin: Skin is warm and dry.     Following labs were Reviewed   Recent Labs   Lab 07/20/22  0451   WBC 5.98   HGB 8.5*   HCT 26.4*      CALCIUM 8.4*   ALBUMIN 2.6*   PROT 5.5*   *   K 3.4*   CO2 25      BUN 8   CREATININE 0.4*   ALKPHOS 66   ALT 64*   AST 65*   BILITOT 0.7     No results found for: POCTGLUCOSE     All labs within the past 24 hours have been reviewed    Microbiology Results (last 7 days)     Procedure Component Value Units Date/Time    Blood culture [[id number]] Collected: 07/15/22 2017    Order Status: Completed Specimen: Blood from Antecubital, Right Updated: 07/20/22 0232     Blood Culture, Routine No Growth to date      No Growth to date      No Growth to date      No Growth to date      No Growth to date    Blood culture " [[id number]] Collected: 07/15/22 2020    Order Status: Completed Specimen: Blood Updated: 07/20/22 0232     Blood Culture, Routine No Growth to date      No Growth to date      No Growth to date      No Growth to date      No Growth to date        No orders to display       No results found for this or any previous visit.      Consultants and Procedures   Consultants:  Consults (From admission, onward)        Status Ordering Provider     Inpatient consult to Infectious Diseases  Once        Provider:  Rachel Craig MD    Completed ANAIS PONCE     Inpatient consult to Internal Medicine  Once        Provider:  Anais Ponce MD    Completed DARNELL HARRIS III     Consult to Anesthesiology  Once        Provider:  Lawrence Cabrera MD    Acknowledged DARNELL HARRIS III          Procedures:   As above    Discharge Information:   Diet:  Resume regular diet    Physical Activity:  Activity as tolerated    Instructions:  1. Take all medications as prescribed  2. Keep all follow-up appointments  3. Return to the hospital or call your primary care physicians if any worsening symptoms such as chest pain, abdominal pain occur.      Follow-Up Appointments:  1. Please call your primary care physician to schedule an appointment in 1 week time.  2. Please call your general surgery to schedule appointment in 1 week time.     Follow-up Information     Marcel Mueller MD Follow up in 1 week(s).    Specialty: Family Medicine  Contact information:  [address]  [address]  [facility] [place] [facility]  [place] [address]  [phone]                           Pending laboratory work/Tests to be performed/followed by the Primary care Physician: none    The patient was discharged in the care of her parents//wife/family/caregiver, with discharge instructions were reviewed in written and verbal form. All pertinent questions were discussed and prescriptions were provided. The importance of making follow up  appointments and compliance of medications has been stressed repeatedly. The patient will follow up in 1 week or sooner as needed with the PCP, and the patient is on board with the plan. Upon discharge, patient needs to be on following medications.    Discharge Medications:     Medication List      START taking these medications    HYDROcodone-acetaminophen 5-325 mg per tablet  Commonly known as: NORCO  Take 1 tablet by mouth every 8 (eight) hours as needed for Pain.        CONTINUE taking these medications    amLODIPine 5 MG tablet  Commonly known as: NORVASC     carvediloL 6.25 MG tablet  Commonly known as: COREG     cholecalciferol (vitamin D3) 125 mcg (5,000 unit) Tab     ciprofloxacin HCl 500 MG tablet  Commonly known as: CIPRO  Take 1 tablet (500 mg total) by mouth 2 (two) times daily. for 5 days     CRESTOR 10 MG tablet  Generic drug: rosuvastatin     ezetimibe 10 mg tablet  Commonly known as: ZETIA     fluticasone propionate 50 mcg/actuation nasal spray  Commonly known as: FLONASE     gabapentin 100 MG capsule  Commonly known as: NEURONTIN  Take 1 capsule (100 mg total) by mouth 3 (three) times daily.     pantoprazole 40 MG tablet  Commonly known as: PROTONIX     pulse oximeter device  Commonly known as: pulse oximeter  Use twice daily at 8 AM and 3 PM and record the value in [employer] as directed.     ticagrelor 60 mg tablet  Commonly known as: BRILINTA     ZINC-15 ORAL        STOP taking these medications    atorvastatin 80 MG tablet  Commonly known as: LIPITOR     azelastine 137 mcg (0.1 %) nasal spray  Commonly known as: ASTELIN     b complex vitamins tablet     olmesartan-hydrochlorothiazide 20-12.5 mg per tablet  Commonly known as: BENICAR HCT     PIPERACILLIN-TAZOBACTAM 3.375G/50ML D5W (READY TO MIX)           Where to Get Your Medications      Information about where to get these medications is not yet available    Ask your nurse or doctor about these medications  · ciprofloxacin HCl 500 MG  tablet  · HYDROcodone-acetaminophen 5-325 mg per tablet           I spent 40 minutes preparing the discharge including reviewing records from previous encounters, preparation of discharge summary, assessing and final examination of the patient, discharge medicine reconciliation, discussing plan of care, follow up and education and prescriptions.       Jaime Willis  [facility] Hospitalist  07/20/2022

## 2022-07-20 NOTE — PLAN OF CARE
LANCE spoke with Dr Willis regarding discharge disposition. Patient had [facility] at admission but has completed all home IV antibiotics and will no longer need home health services. LANCE spoke with patient and spouse both agree that home health is no longer necessary. Patient will discharge with J tube and with follow up with Dr Mott in 1 week. Nurse Bose training patient on RADHAMES drain.    LANCE contacted Dr Mott office and arranged appointment for 7/28 at 9 am.

## 2022-07-20 NOTE — PT/OT/SLP PROGRESS
Physical Therapy Treatment    Patient Name:  Tayla Ewing   MRN:  [id number]    Recommendations:     Discharge Recommendations:  home   Discharge Equipment Recommendations: none   Barriers to discharge: None    Assessment:     Tayla Ewing is a 77 y.o. female admitted with a medical diagnosis of Diverticular disease of intestine with perforation and abscess.  She has met all PT goals set at evaluation and is being discharged from PT at this time. No further skilled PT intervention is needed and pt was discharged home by MD prior to this documentation being completed.   .    Rehab Prognosis: Good; patient would benefit from acute skilled PT services to address these deficits and reach maximum level of function.    Recent Surgery: Procedure(s) (LRB):  COLECTOMY, SIGMOID, LAPAROSCOPIC (N/A)  SALPINGO-OOPHORECTOMY, LAPAROSCOPIC (Left)  INCISION AND DRAINAGE, ABSCESS (N/A)  CYSTOSCOPY, WITH URETERAL STENT INSERTION (N/A) 5 Days Post-Op    Plan:     During this hospitalization, patient to be seen 5 x/week to address the identified rehab impairments via gait training, therapeutic activities, therapeutic exercises and progress toward the following goals:    · Plan of Care Expires:  07/20/22 (pt discharged home after treatment session and prior to documentation being completed.)    Subjective     Chief Complaint: Pt reported she feels well and is ready to go home. The pt did say she hopes they take out the surgical drain prior to discharge.  Patient/Family Comments/goals: home with  and son to assist.  Pain/Comfort:  · Pain Rating 1: 0/10  · Pain Rating Post-Intervention 1: 0/10      Objective:     Communicated with LAWANDA Bose prior to session.  Patient found up in chair with RADHAMES drain, peripheral IV upon PT entry to room.     General Precautions: Standard, fall   Orthopedic Precautions:N/A   Braces: N/A  Respiratory Status: Room air     Functional Mobility:  · Transfers:     · Sit to Stand:  modified independence  with no AD  · Gait: x 220 feet with SBA > Supervision A with no AD needed and no instability/LOB noted.      AM-PAC 6 CLICK MOBILITY          Therapeutic Activities and Exercises:   Toileting with Sup A and grab bar for transfers and indep hygiene.    Patient left up in chair with all lines intact, call button in reach and pt's  and son present..    GOALS:   Multidisciplinary Problems     Physical Therapy Goals        Problem: Physical Therapy    Goal Priority Disciplines Outcome Goal Variances Interventions   Physical Therapy Goal     PT, PT/OT Ongoing, Progressing     Description: Goals to be met by: 2022    Patient will increase functional independence with mobility by performin. Supine to sit with Modified [place]  2. Sit to stand transfer with Modified [place]  3. Gait  x 300 feet with Modified [place]  with or without AD                     Time Tracking:     PT Received On: 22  PT Start Time: 929     PT Stop Time: 939  PT Total Time (min): 10 min     Billable Minutes: Gait Training 10    Treatment Type: Treatment  PT/PTA: PT     PTA Visit Number: 2     2022

## 2022-07-20 NOTE — NURSING
Dc instructions given, including medications. Educated pt on emptying RADHAMES drain to abdoment and to write down the drainage amount in order to give the drainage amount to Dr. oMtt at follow up appt. Pt verbalizes understanding. Pt Dc'ed home via ambulatory on room air to POV with  and daughter. Pt tolerated well. No s/s of pain or distress noted.

## 2022-07-20 NOTE — CARE UPDATE
07/19/22 6354   Aerosol Therapy   $ Aerosol Therapy Charges PRN treatment not required   Education   $ Education Bronchodilator;15 min   Respiratory Evaluation   $ Care Plan Tech Time 15 min

## 2022-07-20 NOTE — CARE UPDATE
07/20/22 0827   Patient Assessment/Suction   Level of Consciousness (AVPU) alert   All Lung Fields Breath Sounds clear   PRE-TX-O2   O2 Device (Oxygen Therapy) room air   SpO2 97 %   Pulse Oximetry Type Intermittent   $ Pulse Oximetry - Multiple Charge Pulse Oximetry - Multiple   Pulse 96   Resp 15   Aerosol Therapy   $ Aerosol Therapy Charges PRN treatment not required   Education   $ Education Bronchodilator;15 min   Respiratory Evaluation   $ Care Plan Tech Time 15 min

## 2022-07-20 NOTE — DISCHARGE INSTRUCTIONS
Diet:  Resume regular diet    Physical Activity:  Activity as tolerated    Instructions:  1. Take all medications as prescribed  2. Keep all follow-up appointments  3. Return to the hospital or call your primary care physicians if any worsening symptoms such as chest pain, abdominal pain occur.      Follow-Up Appointments:  Please call your primary care physician to schedule an appointment in 1 week time.  2. Please call your general surgery to schedule appointment in 1 week time.

## 2022-07-20 NOTE — PLAN OF CARE
Patient cleared for discharge from case management standpoint.    Follow up appointments scheduled and added to AVS.    Patient was current with [facility] upon hospital admission but will no longer need home health care. CM left message with Ying at [facility] to notify.    Chart and discharge orders reviewed.  Patient discharged home with no further case management needs.       07/20/22 1157   Final Note   Assessment Type Final Discharge Note   Anticipated Discharge Disposition Home   What phone number can be called within the next 1-3 days to see how you are doing after discharge? [phone]   Hospital Resources/Appts/Education Provided Provided patient/caregiver with written discharge plan information;Appointments scheduled and added to AVS

## 2022-07-21 LAB
BACTERIA BLD CULT: NORMAL
BACTERIA BLD CULT: NORMAL

## 2022-07-25 ENCOUNTER — PATIENT MESSAGE (OUTPATIENT)
Dept: SURGERY | Facility: CLINIC | Age: 77
End: 2022-07-25
Payer: MEDICARE

## 2022-07-25 ENCOUNTER — TELEPHONE (OUTPATIENT)
Dept: SURGERY | Facility: CLINIC | Age: 77
End: 2022-07-25
Payer: MEDICARE

## 2022-07-25 NOTE — TELEPHONE ENCOUNTER
----- Message from Bhupinder Briceno sent at 7/25/2022  8:11 AM CDT -----  Contact: pt at [phone]  Type: Needs Medical Advice  Who Called:  pt    Best Call Back Number: [phone]    Additional Information: pt is calling the office stating she's having bad pains where her drains are. She states she would like to come in today. Please call back and advise.

## 2022-07-26 ENCOUNTER — OFFICE VISIT (OUTPATIENT)
Dept: SURGERY | Facility: CLINIC | Age: 77
End: 2022-07-26
Payer: MEDICARE

## 2022-07-26 VITALS — SYSTOLIC BLOOD PRESSURE: 160 MMHG | HEART RATE: 101 BPM | TEMPERATURE: 98 F | DIASTOLIC BLOOD PRESSURE: 76 MMHG

## 2022-07-26 DIAGNOSIS — Z98.890 POST-OPERATIVE STATE: Primary | ICD-10-CM

## 2022-07-26 PROCEDURE — 99024 POSTOP FOLLOW-UP VISIT: CPT | Mod: S$GLB,POP,, | Performed by: PHYSICIAN ASSISTANT

## 2022-07-26 PROCEDURE — 99024 PR POST-OP FOLLOW-UP VISIT: ICD-10-PCS | Mod: S$GLB,POP,, | Performed by: PHYSICIAN ASSISTANT

## 2022-07-26 NOTE — PROGRESS NOTES
Tayla Ewing is status post lap assisted sigmoid colectomy and presents today for follow-up care.  She has the following systemic symptoms or complaints: no complaints except mild constipation which improved after taking miralax. RADHAMES drainage output is decreasing and is serous.     PE: Abdomen is soft, non-tender, non-distended.  Incisions clean, dry, and intact. No signs of infection. RADHAMES removed    Pathology: reviewed     A/P:  Normal post-operative course.    The patient is instructed to avoid heavy lifting until 4 weeks after the surgery date.  Return to clinic as needed

## 2022-07-28 ENCOUNTER — DOCUMENT SCAN (OUTPATIENT)
Dept: HOME HEALTH SERVICES | Facility: HOSPITAL | Age: 77
End: 2022-07-28
Payer: MEDICARE

## 2022-09-14 DIAGNOSIS — Z78.0 MENOPAUSE: ICD-10-CM

## 2022-12-01 ENCOUNTER — PATIENT MESSAGE (OUTPATIENT)
Dept: PSYCHIATRY | Facility: CLINIC | Age: 77
End: 2022-12-01
Payer: MEDICARE

## 2023-01-29 NOTE — CARE UPDATE
CLAUDIO called [facility] for follow up and was informed they will see patient by the end of the day. CLAUDIO called [facility] again at 4:00pm to follow up, no answer. CM to follow up.    Stop taking omeprazole and start pepcid twice a day. A referral has been placed for gastroenterology someone should be contacting you with information. If you do not hear from them call [phone]. You have symptoms consistent with heartburn also known as GERD. Take heartburn medication as prescribed before meals. Avoid spicy food, caffeine, chocolate, fried foods, carbonated drinks, acids such as lemon, oranges, limes and tomato sauces. GERD needs to be kept in control because it can cause harm to your esophagus and can also lead to an ulcer. You should follow up with your primary care provider to discuss long term treatment or possible lifestyle modifications to help control your symptoms. Sometimes if bad enough you may need to have an endoscopy by a gastroenterologist to evaluate your esophagus and stomach. Follow up with urgent care as needed and make an appointment with your primary care.

## 2023-02-20 ENCOUNTER — PATIENT MESSAGE (OUTPATIENT)
Dept: PSYCHIATRY | Facility: CLINIC | Age: 78
End: 2023-02-20
Payer: MEDICARE

## 2023-03-29 ENCOUNTER — TELEPHONE (OUTPATIENT)
Dept: SURGERY | Facility: CLINIC | Age: 78
End: 2023-03-29
Payer: MEDICARE

## 2023-04-06 ENCOUNTER — OFFICE VISIT (OUTPATIENT)
Dept: SURGERY | Facility: CLINIC | Age: 78
End: 2023-04-06
Payer: MEDICARE

## 2023-04-06 VITALS — HEART RATE: 71 BPM | DIASTOLIC BLOOD PRESSURE: 73 MMHG | SYSTOLIC BLOOD PRESSURE: 160 MMHG | TEMPERATURE: 98 F

## 2023-04-06 DIAGNOSIS — K43.9 VENTRAL HERNIA WITHOUT OBSTRUCTION OR GANGRENE: ICD-10-CM

## 2023-04-06 DIAGNOSIS — K43.2 INCISIONAL HERNIA, WITHOUT OBSTRUCTION OR GANGRENE: Primary | ICD-10-CM

## 2023-04-06 PROCEDURE — 99214 PR OFFICE/OUTPT VISIT, EST, LEVL IV, 30-39 MIN: ICD-10-PCS | Mod: S$GLB,,, | Performed by: SURGERY

## 2023-04-06 PROCEDURE — 99214 OFFICE O/P EST MOD 30 MIN: CPT | Mod: S$GLB,,, | Performed by: SURGERY

## 2023-04-06 RX ORDER — AMOXICILLIN AND CLAVULANATE POTASSIUM 875; 125 MG/1; MG/1
1 TABLET, FILM COATED ORAL 2 TIMES DAILY
COMMUNITY
Start: 2023-04-04

## 2023-04-06 RX ORDER — POTASSIUM CHLORIDE 750 MG/1
10 TABLET, EXTENDED RELEASE ORAL
COMMUNITY
Start: 2023-03-10

## 2023-04-06 RX ORDER — POLYETHYLENE GLYCOL 3350, SODIUM SULFATE, SODIUM CHLORIDE, POTASSIUM CHLORIDE, ASCORBIC ACID, SODIUM ASCORBATE 140-9-5.2G
KIT ORAL
COMMUNITY
Start: 2023-02-27

## 2023-04-06 RX ORDER — NITROGLYCERIN 0.3 MG/1
TABLET SUBLINGUAL
COMMUNITY
Start: 2023-03-10

## 2023-04-06 RX ORDER — MONTELUKAST SODIUM 10 MG/1
10 TABLET ORAL
COMMUNITY
Start: 2023-01-04

## 2023-04-06 NOTE — PROGRESS NOTES
Subjective:       Patient ID: Tayla Ewing is a 78 y.o. female.    Chief Complaint:     HPI:  Status post sigmoidectomy for complicated diverticulitis 1 year ago.  Surgery noticed bulging at the incision site over the past month or 2.  No obstructive symptoms.  No pain.    Past Medical History:   Diagnosis Date    Back pain     Diverticulitis of colon with perforation 04/05/2022    History of heart artery stent 09/06/2018    History of heart attack 09/06/2018    Hypertension     Intra-abdominal abscess 05/31/2022    post diverticulitis    Multiple sclerosis     Neck pain     Neurogenic bladder 06/24/2015    TMJ (dislocation of temporomandibular joint)      Past Surgical History:   Procedure Laterality Date    BRAIN BIOPSY      2009    CYSTOSCOPY W/ URETERAL STENT PLACEMENT N/A 7/15/2022    Procedure: CYSTOSCOPY, WITH URETERAL STENT INSERTION;  Surgeon: Geovani Holden MD;  Location: [facility];  Service: Urology;  Laterality: N/A;    HYSTERECTOMY      INCISION AND DRAINAGE OF ABSCESS N/A 7/15/2022    Procedure: INCISION AND DRAINAGE, ABSCESS;  Surgeon: Antonio Mott III, MD;  Location: [facility];  Service: General;  Laterality: N/A;    LAPAROSCOPIC SALPINGO-OOPHORECTOMY Left 7/15/2022    Procedure: SALPINGO-OOPHORECTOMY, LAPAROSCOPIC;  Surgeon: Antonio Mott III, MD;  Location: [facility] OR;  Service: General;  Laterality: Left;    LAPAROSCOPIC SIGMOIDECTOMY N/A 7/15/2022    Procedure: COLECTOMY, SIGMOID, LAPAROSCOPIC;  Surgeon: Antonio Mott III, MD;  Location: [facility];  Service: General;  Laterality: N/A;     Review of patient's allergies indicates:  No Known Allergies  Medication List with Changes/Refills   Current Medications    AMLODIPINE (NORVASC) 5 MG TABLET    Take 5 mg by mouth once daily.    AMOXICILLIN-CLAVULANATE 875-125MG (AUGMENTIN) 875-125 MG PER TABLET    Take 1 tablet by mouth 2 (two) times daily.    CARVEDILOL (COREG) 6.25 MG TABLET    Take 6.25 mg by mouth 2 (two) times daily.     CHOLECALCIFEROL, VITAMIN D3, 125 MCG (5,000 UNIT) TAB    Take 5,000 Units by mouth once daily.    CRESTOR 10 MG TABLET    Take 10 mg by mouth once daily.    EZETIMIBE (ZETIA) 10 MG TABLET    Take 10 mg by mouth once daily.    FLUTICASONE PROPIONATE (FLONASE) 50 MCG/ACTUATION NASAL SPRAY    1-2 sprays by Each Nostril route once daily.    GABAPENTIN (NEURONTIN) 100 MG CAPSULE    Take 1 capsule (100 mg total) by mouth 3 (three) times daily.    MONTELUKAST (SINGULAIR) 10 MG TABLET    Take 10 mg by mouth 2 (two) times daily.    NITROGLYCERIN (NITROSTAT) 0.3 MG SL TABLET    Place under the tongue.    PANTOPRAZOLE (PROTONIX) 40 MG TABLET    Take 40 mg by mouth once daily.    PLENVU 140-9-5.2 GRAM PPKS    Take as directed    POTASSIUM CHLORIDE (KLOR-CON) 10 MEQ TBSR    Take 10 mEq by mouth.    PULSE OXIMETER (PULSE OXIMETER) DEVICE    Use twice daily at 8 AM and 3 PM and record the value in MyChart as directed.    TICAGRELOR (BRILINTA) 60 MG TABLET    Take 60 mg by mouth 2 (two) times daily.    ZINC SULFATE (ZINC-15 ORAL)    Take 1 tablet by mouth once daily.     Family History   Problem Relation Age of Onset    Hypertension Mother      Social History     Socioeconomic History    Marital status:    Tobacco Use    Smoking status: Never    Smokeless tobacco: Never   Substance and Sexual Activity    Alcohol use: No         Review of Systems   Constitutional:  Negative for appetite change, chills, fever and unexpected weight change.   HENT:  Negative for hearing loss, rhinorrhea, sore throat and voice change.    Eyes:  Negative for photophobia and visual disturbance.   Respiratory:  Negative for cough, choking and shortness of breath.    Cardiovascular:  Negative for chest pain, palpitations and leg swelling.   Gastrointestinal:  Negative for abdominal pain, blood in stool, constipation, diarrhea, nausea and vomiting.        Bulging mid abdomen   Endocrine: Negative for cold intolerance, heat intolerance, polydipsia and  polyuria.   Musculoskeletal:  Negative for arthralgias, back pain, joint swelling and neck stiffness.   Skin:  Negative for color change, pallor and rash.   Neurological:  Negative for dizziness, seizures, syncope and headaches.   Hematological:  Negative for adenopathy. Does not bruise/bleed easily.   Psychiatric/Behavioral:  Negative for agitation, behavioral problems and confusion.      Objective:      Physical Exam  Constitutional:       General: She is awake. She is not in acute distress.     Appearance: Normal appearance. She is well-developed. She is not toxic-appearing.   HENT:      Head: Normocephalic and atraumatic.   Pulmonary:      Effort: No tachypnea, bradypnea, accessory muscle usage or respiratory distress.   Abdominal:      General: There is no distension.      Palpations: Abdomen is soft.      Tenderness: There is no abdominal tenderness.      Hernia: A hernia is present. Hernia is present in the ventral area.          Comments: Hernia involving the midline hand port.  Reducible.  Nontender.   Musculoskeletal:      Cervical back: Neck supple.   Neurological:      Mental Status: She is alert and oriented to person, place, and time.   Psychiatric:         Behavior: Behavior is cooperative.       Assessment/Plan:   Incisional hernia, without obstruction or gangrene  -     Case Request Operating Room: REPAIR, HERNIA, INCISIONAL OR VENTRAL, WITHOUT HISTORY OF PRIOR REPAIR  -     Basic metabolic panel; Future; Expected date: 04/06/2023  -     CBC auto differential; Future; Expected date: 04/06/2023  -     EKG 12-lead; Future  -     X-Ray Chest PA And Lateral; Future; Expected date: 04/06/2023    Ventral hernia without obstruction or gangrene  -     CT Abdomen Pelvis  Without Contrast; Future; Expected date: 04/06/2023    Other orders  -     Full code; Standing  -     Vital signs; Standing  -     Insert peripheral IV; Standing  -     Diet NPO; Standing  -     IP VTE LOW RISK PATIENT; Standing  -     Place  sequential compression device; Standing  -     ceFAZolin (ANCEF) 2 g in dextrose 5 % (D5W) 50 mL IVPB  -     Pulse Oximetry Q4H; Standing  -     Place in Outpatient; Standing    Will plan for incisional hernia repair in June.  Risks and benefits of procedure discussed with the patient.  Will plan for robotic approach.    I discussed the proposed procedures with the patient including risks, benefits, indications, alternatives and special concerns.  The patient appears to understand and agrees to go ahead with surgery.  I have made no promises, warranties or verbal agreements beyond what was discussed above.    No follow-ups on file.

## 2023-04-14 ENCOUNTER — HOSPITAL ENCOUNTER (OUTPATIENT)
Dept: RADIOLOGY | Facility: HOSPITAL | Age: 78
Discharge: HOME | End: 2023-04-14
Attending: SURGERY
Payer: MEDICARE

## 2023-04-14 DIAGNOSIS — K43.9 VENTRAL HERNIA WITHOUT OBSTRUCTION OR GANGRENE: ICD-10-CM

## 2023-04-14 DIAGNOSIS — K43.2 INCISIONAL HERNIA, WITHOUT OBSTRUCTION OR GANGRENE: ICD-10-CM

## 2023-04-14 PROCEDURE — 71046 X-RAY EXAM CHEST 2 VIEWS: CPT | Mod: TC,PO

## 2023-04-14 PROCEDURE — 74176 CT ABD & PELVIS W/O CONTRAST: CPT | Mod: TC,PO

## 2023-06-06 ENCOUNTER — HOSPITAL ENCOUNTER (OUTPATIENT)
Dept: PREADMISSION TESTING | Facility: HOSPITAL | Age: 78
Discharge: HOME | End: 2023-06-06
Attending: SURGERY
Payer: MEDICARE

## 2023-06-06 DIAGNOSIS — K43.2 INCISIONAL HERNIA, WITHOUT OBSTRUCTION OR GANGRENE: ICD-10-CM

## 2023-06-06 DIAGNOSIS — Z01.818 PRE-OP EXAM: Primary | ICD-10-CM

## 2023-06-06 PROCEDURE — 99900104 DSU ONLY-NO CHARGE-EA ADD'L HR (STAT)

## 2023-06-06 PROCEDURE — 93010 EKG 12-LEAD: ICD-10-PCS | Mod: ,,, | Performed by: GENERAL PRACTICE

## 2023-06-06 PROCEDURE — 99900103 DSU ONLY-NO CHARGE-INITIAL HR (STAT)

## 2023-06-06 PROCEDURE — 93010 ELECTROCARDIOGRAM REPORT: CPT | Mod: ,,, | Performed by: GENERAL PRACTICE

## 2023-06-06 PROCEDURE — 93005 ELECTROCARDIOGRAM TRACING: CPT

## 2023-06-06 RX ORDER — CETIRIZINE HYDROCHLORIDE 10 MG/1
10 TABLET ORAL DAILY
COMMUNITY

## 2023-06-06 RX ORDER — OLMESARTAN MEDOXOMIL AND HYDROCHLOROTHIAZIDE 20/12.5 20; 12.5 MG/1; MG/1
TABLET ORAL
COMMUNITY
Start: 2023-05-27

## 2023-06-06 RX ORDER — AZELASTINE 1 MG/ML
SPRAY, METERED NASAL
COMMUNITY
Start: 2023-01-04

## 2023-06-06 NOTE — DISCHARGE INSTRUCTIONS
To confirm, Your doctor has instructed you that surgery is scheduled for: 6/9/23 with Dr. Mott    Please report to [facility], Registration the morning of surgery. You must check-in and receive a wristband before going to your procedure.    Pre-Op will call the afternoon prior to surgery between 1:00 and 6:00 PM with the final arrival time.  Phone number: [phone]    PLEASE NOTE:  The surgery schedule has many variables which may affect the time of your surgery case.  Family members should be available if your surgery time changes.  Plan to be here the day of your procedure between 4-6 hours.    MEDICATIONS:  TAKE ONLY THESE MEDICATIONS WITH A SMALL SIP OF WATER THE MORNING OF YOUR PROCEDURE:  NASAL SPRAY, CARVEDILOL, AMLODIPINE, ROSUVASTATIN, EZETIMIBE, GABAPENTIN, PANTOPRAZOLE, ZYRTEC    NO OLEMSARTAN/ HCTZ MORNING OF SURGERY BUT DO NOT STOP DAYS BEFORE.      DO NOT TAKE THESE MEDICATIONS 5-7 DAYS PRIOR to your procedure or per your surgeon's request:   ASPIRIN, ALEVE, ADVIL, IBUPROFEN, FISH OIL VITAMIN E, HERBALS  (May take Tylenol)    FOLLOW THE INSTRUCTIONS OF YOUR CARDIOLOGIST AND DR. MOTT FOR BRILINTA PRIOR TO SURGERY.    ONLY if you are prescribed any types of blood thinners such as:  Aspirin, Coumadin, Plavix, Pradaxa, Xarelto, Aggrenox, Effient, Eliquis, Savasya, Brilinta, or any other, ask your surgeon whether you should stop taking them and how long before surgery you should stop.  You may also need to verify with the prescribing physician if it is ok to stop your medication.      INSTRUCTIONS IMPORTANT!!  Do not eat or drink anything between midnight and the time of your procedure- this includes gum, mints, and candy.  Do not smoke or drink alcoholic beverages 24 hours prior to your procedure.  Shower the night before AND the morning of your procedure with a Chlorhexidine wash such as Hibiclens or Dial antibacterial soap from the neck down.  Do not get it on your face or  in your eyes.  You may use your own shampoo and face wash. This helps your skin to be as bacteria free as possible.    If you wear contact lenses, dentures, hearing aids or glasses, bring a container to put them in during surgery and give to a family member for safe keeping.  Please leave all jewelry, piercing's and valuables at home. You must remove your false eyelashes prior to surgery.    DO NOT remove hair from the surgery site.  Do not shave the incision site unless you are given specific instructions to do so.    ONLY if you have been diagnosed with sleep apnea please bring your C-PAP machine.  ONLY if you wear home oxygen please bring your portable oxygen tank the day of your procedure.  ONLY if you have a history of OPEN HEART SURGERY you will need a clearance from your Cardiologist per Anesthesia.      ONLY for patients requiring bowel prep, written instructions will be given by your doctor's office.  ONLY if you have a neuro stimulator, please bring the controller with you the morning of surgery  ONLY if a type and screen test is needed before surgery, please return:  If your doctor has scheduled you for an overnight stay, bring a small overnight bag with any personal items you need.  Make arrangements in advance for transportation home by a responsible adult.  It is not safe to drive a vehicle during the 24 hours after anesthesia.      [facility] Visitor Policy    Effective September 26, 2022    [facility] will resume routine visitation for COVID-19 negative patients, including inpatients, outpatients, and procedural areas, in accordance with local campus procedures.    All [facility] facilities and properties are tobacco free.  Smoking is NOT allowed.   If you have any questions about these instructions, call Pre-Op Admit  Nursing at [phone] or the Pre-Op Day Surgery Unit at [phone].

## 2023-06-08 ENCOUNTER — ANESTHESIA EVENT (OUTPATIENT)
Dept: SURGERY | Facility: HOSPITAL | Age: 78
End: 2023-06-08
Payer: MEDICARE

## 2023-06-08 ENCOUNTER — TELEPHONE (OUTPATIENT)
Dept: SURGERY | Facility: CLINIC | Age: 78
End: 2023-06-08
Payer: MEDICARE

## 2023-06-08 NOTE — TELEPHONE ENCOUNTER
----- Message from Norma Clark sent at 6/8/2023  8:31 AM CDT -----  Contact: self  Type:  Needs Medical Advice    Who Called: Pt  Symptoms (please be specific):    Pt is having a  ROBOTIC REPAIR, HERNIA, INCISIONAL [3645] Friday, 6/9    Would the patient rather a call back or a response via [facility]?  Call  Best Call Back Number: [phone]    Additional Information: Pt would like to know if she have any special instructions prior to procedure...    Please call to advise... Thank you...

## 2023-06-09 ENCOUNTER — ANESTHESIA (OUTPATIENT)
Dept: SURGERY | Facility: HOSPITAL | Age: 78
End: 2023-06-09
Payer: MEDICARE

## 2023-06-09 ENCOUNTER — HOSPITAL ENCOUNTER (OUTPATIENT)
Facility: HOSPITAL | Age: 78
Discharge: HOME | End: 2023-06-09
Attending: SURGERY | Admitting: SURGERY
Payer: MEDICARE

## 2023-06-09 VITALS
SYSTOLIC BLOOD PRESSURE: 128 MMHG | RESPIRATION RATE: 16 BRPM | HEART RATE: 91 BPM | TEMPERATURE: 98 F | OXYGEN SATURATION: 96 % | DIASTOLIC BLOOD PRESSURE: 58 MMHG

## 2023-06-09 DIAGNOSIS — Z01.818 PRE-OP EXAM: ICD-10-CM

## 2023-06-09 DIAGNOSIS — K43.9 VENTRAL HERNIA WITHOUT OBSTRUCTION OR GANGRENE: ICD-10-CM

## 2023-06-09 PROBLEM — K43.2 INCISIONAL HERNIA, WITHOUT OBSTRUCTION OR GANGRENE: Status: ACTIVE | Noted: 2023-06-09

## 2023-06-09 LAB
APTT PPP: 23.6 SEC (ref 21–32)
INR PPP: 1 (ref 0.8–1.2)
PROTHROMBIN TIME: 10.9 SEC (ref 9–12.5)

## 2023-06-09 PROCEDURE — 71000015 HC POSTOP RECOV 1ST HR: Performed by: SURGERY

## 2023-06-09 PROCEDURE — 36000711: Performed by: SURGERY

## 2023-06-09 PROCEDURE — 63600175 PHARM REV CODE 636 W HCPCS: Performed by: ANESTHESIOLOGY

## 2023-06-09 PROCEDURE — 27201423 OPTIME MED/SURG SUP & DEVICES STERILE SUPPLY: Performed by: SURGERY

## 2023-06-09 PROCEDURE — 25000003 PHARM REV CODE 250: Performed by: NURSE ANESTHETIST, CERTIFIED REGISTERED

## 2023-06-09 PROCEDURE — D9220A PRA ANESTHESIA: ICD-10-PCS | Mod: CRNA,,, | Performed by: NURSE ANESTHETIST, CERTIFIED REGISTERED

## 2023-06-09 PROCEDURE — 85610 PROTHROMBIN TIME: CPT | Performed by: ANESTHESIOLOGY

## 2023-06-09 PROCEDURE — 37000009 HC ANESTHESIA EA ADD 15 MINS: Performed by: SURGERY

## 2023-06-09 PROCEDURE — 49593 PR REPAIR ANT ABD HERNIA INITIAL 3-10 CM REDUCIBLE: ICD-10-PCS | Mod: ,,, | Performed by: SURGERY

## 2023-06-09 PROCEDURE — 63600175 PHARM REV CODE 636 W HCPCS: Performed by: SURGERY

## 2023-06-09 PROCEDURE — D9220A PRA ANESTHESIA: Mod: CRNA,,, | Performed by: NURSE ANESTHETIST, CERTIFIED REGISTERED

## 2023-06-09 PROCEDURE — 85730 THROMBOPLASTIN TIME PARTIAL: CPT | Performed by: ANESTHESIOLOGY

## 2023-06-09 PROCEDURE — 36415 COLL VENOUS BLD VENIPUNCTURE: CPT | Performed by: ANESTHESIOLOGY

## 2023-06-09 PROCEDURE — C1781 MESH (IMPLANTABLE): HCPCS | Performed by: SURGERY

## 2023-06-09 PROCEDURE — 37000008 HC ANESTHESIA 1ST 15 MINUTES: Performed by: SURGERY

## 2023-06-09 PROCEDURE — 49593 RPR AA HRN 1ST 3-10 RDC: CPT | Mod: ,,, | Performed by: SURGERY

## 2023-06-09 PROCEDURE — 99900104 DSU ONLY-NO CHARGE-EA ADD'L HR (STAT): Performed by: SURGERY

## 2023-06-09 PROCEDURE — 36000710: Performed by: SURGERY

## 2023-06-09 PROCEDURE — D9220A PRA ANESTHESIA: Mod: ANES,,, | Performed by: ANESTHESIOLOGY

## 2023-06-09 PROCEDURE — C9290 INJ, BUPIVACAINE LIPOSOME: HCPCS | Performed by: SURGERY

## 2023-06-09 PROCEDURE — 71000033 HC RECOVERY, INTIAL HOUR: Performed by: SURGERY

## 2023-06-09 PROCEDURE — D9220A PRA ANESTHESIA: ICD-10-PCS | Mod: ANES,,, | Performed by: ANESTHESIOLOGY

## 2023-06-09 PROCEDURE — 25000003 PHARM REV CODE 250: Performed by: ANESTHESIOLOGY

## 2023-06-09 PROCEDURE — 25000003 PHARM REV CODE 250: Performed by: SURGERY

## 2023-06-09 PROCEDURE — 71000039 HC RECOVERY, EACH ADD'L HOUR: Performed by: SURGERY

## 2023-06-09 PROCEDURE — 99900103 DSU ONLY-NO CHARGE-INITIAL HR (STAT): Performed by: SURGERY

## 2023-06-09 PROCEDURE — 63600175 PHARM REV CODE 636 W HCPCS: Performed by: NURSE ANESTHETIST, CERTIFIED REGISTERED

## 2023-06-09 PROCEDURE — 94799 UNLISTED PULMONARY SVC/PX: CPT

## 2023-06-09 DEVICE — MESH PARIETENE DS COMP 15X10CM: Type: IMPLANTABLE DEVICE | Site: ABDOMEN | Status: FUNCTIONAL

## 2023-06-09 RX ORDER — ONDANSETRON 2 MG/ML
4 INJECTION INTRAMUSCULAR; INTRAVENOUS ONCE
Status: DISCONTINUED | OUTPATIENT
Start: 2023-06-09 | End: 2023-06-09 | Stop reason: HOSPADM

## 2023-06-09 RX ORDER — KETOROLAC TROMETHAMINE 30 MG/ML
INJECTION, SOLUTION INTRAMUSCULAR; INTRAVENOUS
Status: DISCONTINUED | OUTPATIENT
Start: 2023-06-09 | End: 2023-06-09

## 2023-06-09 RX ORDER — HYDROCODONE BITARTRATE AND ACETAMINOPHEN 5; 325 MG/1; MG/1
1 TABLET ORAL EVERY 6 HOURS PRN
Qty: 20 TABLET | Refills: 0 | Status: SHIPPED | OUTPATIENT
Start: 2023-06-09

## 2023-06-09 RX ORDER — EPHEDRINE SULFATE 50 MG/ML
INJECTION, SOLUTION INTRAVENOUS
Status: DISCONTINUED | OUTPATIENT
Start: 2023-06-09 | End: 2023-06-09

## 2023-06-09 RX ORDER — SODIUM CHLORIDE, SODIUM LACTATE, POTASSIUM CHLORIDE, CALCIUM CHLORIDE 600; 310; 30; 20 MG/100ML; MG/100ML; MG/100ML; MG/100ML
INJECTION, SOLUTION INTRAVENOUS CONTINUOUS
Status: DISCONTINUED | OUTPATIENT
Start: 2023-06-09 | End: 2023-06-09 | Stop reason: HOSPADM

## 2023-06-09 RX ORDER — DEXAMETHASONE SODIUM PHOSPHATE 4 MG/ML
INJECTION, SOLUTION INTRA-ARTICULAR; INTRALESIONAL; INTRAMUSCULAR; INTRAVENOUS; SOFT TISSUE
Status: DISCONTINUED | OUTPATIENT
Start: 2023-06-09 | End: 2023-06-09

## 2023-06-09 RX ORDER — MIDAZOLAM HYDROCHLORIDE 1 MG/ML
INJECTION INTRAMUSCULAR; INTRAVENOUS
Status: DISCONTINUED | OUTPATIENT
Start: 2023-06-09 | End: 2023-06-09

## 2023-06-09 RX ORDER — FENTANYL CITRATE 50 UG/ML
INJECTION, SOLUTION INTRAMUSCULAR; INTRAVENOUS
Status: DISCONTINUED | OUTPATIENT
Start: 2023-06-09 | End: 2023-06-09

## 2023-06-09 RX ORDER — MEPERIDINE HYDROCHLORIDE 50 MG/ML
12.5 INJECTION INTRAMUSCULAR; INTRAVENOUS; SUBCUTANEOUS ONCE
Status: DISCONTINUED | OUTPATIENT
Start: 2023-06-09 | End: 2023-06-09 | Stop reason: HOSPADM

## 2023-06-09 RX ORDER — HYDROMORPHONE HYDROCHLORIDE 2 MG/ML
0.2 INJECTION, SOLUTION INTRAMUSCULAR; INTRAVENOUS; SUBCUTANEOUS EVERY 5 MIN PRN
Status: DISCONTINUED | OUTPATIENT
Start: 2023-06-09 | End: 2023-06-09 | Stop reason: HOSPADM

## 2023-06-09 RX ORDER — ONDANSETRON 2 MG/ML
INJECTION INTRAMUSCULAR; INTRAVENOUS
Status: DISCONTINUED | OUTPATIENT
Start: 2023-06-09 | End: 2023-06-09

## 2023-06-09 RX ORDER — LIDOCAINE HYDROCHLORIDE 10 MG/ML
1 INJECTION, SOLUTION EPIDURAL; INFILTRATION; INTRACAUDAL; PERINEURAL ONCE
Status: DISCONTINUED | OUTPATIENT
Start: 2023-06-09 | End: 2023-06-09 | Stop reason: HOSPADM

## 2023-06-09 RX ORDER — CEFAZOLIN SODIUM 2 G/50ML
2 SOLUTION INTRAVENOUS
Status: COMPLETED | OUTPATIENT
Start: 2023-06-09 | End: 2023-06-09

## 2023-06-09 RX ORDER — OXYCODONE HYDROCHLORIDE 5 MG/1
5 TABLET ORAL
Status: DISCONTINUED | OUTPATIENT
Start: 2023-06-09 | End: 2023-06-09 | Stop reason: HOSPADM

## 2023-06-09 RX ORDER — PROPOFOL 10 MG/ML
VIAL (ML) INTRAVENOUS
Status: DISCONTINUED | OUTPATIENT
Start: 2023-06-09 | End: 2023-06-09

## 2023-06-09 RX ORDER — BUPIVACAINE HYDROCHLORIDE AND EPINEPHRINE 2.5; 5 MG/ML; UG/ML
INJECTION, SOLUTION EPIDURAL; INFILTRATION; INTRACAUDAL; PERINEURAL CODE/TRAUMA/SEDATION MEDICATION
Status: DISCONTINUED | OUTPATIENT
Start: 2023-06-09 | End: 2023-06-09 | Stop reason: HOSPADM

## 2023-06-09 RX ORDER — ROCURONIUM BROMIDE 10 MG/ML
INJECTION, SOLUTION INTRAVENOUS
Status: DISCONTINUED | OUTPATIENT
Start: 2023-06-09 | End: 2023-06-09

## 2023-06-09 RX ORDER — ACETAMINOPHEN 10 MG/ML
INJECTION, SOLUTION INTRAVENOUS
Status: DISCONTINUED | OUTPATIENT
Start: 2023-06-09 | End: 2023-06-09

## 2023-06-09 RX ORDER — SUCCINYLCHOLINE CHLORIDE 20 MG/ML
INJECTION INTRAMUSCULAR; INTRAVENOUS
Status: DISCONTINUED | OUTPATIENT
Start: 2023-06-09 | End: 2023-06-09

## 2023-06-09 RX ORDER — DIPHENHYDRAMINE HYDROCHLORIDE 50 MG/ML
12.5 INJECTION INTRAMUSCULAR; INTRAVENOUS EVERY 6 HOURS PRN
Status: DISCONTINUED | OUTPATIENT
Start: 2023-06-09 | End: 2023-06-09 | Stop reason: HOSPADM

## 2023-06-09 RX ORDER — VECURONIUM BROMIDE FOR INJECTION 1 MG/ML
INJECTION, POWDER, LYOPHILIZED, FOR SOLUTION INTRAVENOUS
Status: DISCONTINUED | OUTPATIENT
Start: 2023-06-09 | End: 2023-06-09

## 2023-06-09 RX ORDER — FENTANYL CITRATE 50 UG/ML
25 INJECTION, SOLUTION INTRAMUSCULAR; INTRAVENOUS EVERY 5 MIN PRN
Status: DISCONTINUED | OUTPATIENT
Start: 2023-06-09 | End: 2023-06-09 | Stop reason: HOSPADM

## 2023-06-09 RX ORDER — LIDOCAINE HYDROCHLORIDE 20 MG/ML
INJECTION INTRAVENOUS
Status: DISCONTINUED | OUTPATIENT
Start: 2023-06-09 | End: 2023-06-09

## 2023-06-09 RX ADMIN — ROCURONIUM BROMIDE 10 MG: 10 INJECTION, SOLUTION INTRAVENOUS at 07:06

## 2023-06-09 RX ADMIN — SUGAMMADEX 200 MG: 100 INJECTION, SOLUTION INTRAVENOUS at 10:06

## 2023-06-09 RX ADMIN — ACETAMINOPHEN 1000 MG: 10 INJECTION, SOLUTION INTRAVENOUS at 09:06

## 2023-06-09 RX ADMIN — CEFAZOLIN SODIUM 2 G: 2 SOLUTION INTRAVENOUS at 08:06

## 2023-06-09 RX ADMIN — EPHEDRINE SULFATE 10 MG: 50 INJECTION, SOLUTION INTRAMUSCULAR; INTRAVENOUS; SUBCUTANEOUS at 09:06

## 2023-06-09 RX ADMIN — PROPOFOL 130 MG: 10 INJECTION, EMULSION INTRAVENOUS at 07:06

## 2023-06-09 RX ADMIN — MIDAZOLAM HYDROCHLORIDE 2 MG: 1 INJECTION, SOLUTION INTRAMUSCULAR; INTRAVENOUS at 07:06

## 2023-06-09 RX ADMIN — FENTANYL CITRATE 25 MCG: 50 INJECTION, SOLUTION INTRAMUSCULAR; INTRAVENOUS at 10:06

## 2023-06-09 RX ADMIN — SODIUM CHLORIDE, SODIUM GLUCONATE, SODIUM ACETATE, POTASSIUM CHLORIDE AND MAGNESIUM CHLORIDE: 526; 502; 368; 37; 30 INJECTION, SOLUTION INTRAVENOUS at 10:06

## 2023-06-09 RX ADMIN — DEXAMETHASONE SODIUM PHOSPHATE 4 MG: 4 INJECTION, SOLUTION INTRA-ARTICULAR; INTRALESIONAL; INTRAMUSCULAR; INTRAVENOUS; SOFT TISSUE at 08:06

## 2023-06-09 RX ADMIN — FENTANYL CITRATE 50 MCG: 50 INJECTION, SOLUTION INTRAMUSCULAR; INTRAVENOUS at 07:06

## 2023-06-09 RX ADMIN — SODIUM CHLORIDE, SODIUM GLUCONATE, SODIUM ACETATE, POTASSIUM CHLORIDE AND MAGNESIUM CHLORIDE: 526; 502; 368; 37; 30 INJECTION, SOLUTION INTRAVENOUS at 06:06

## 2023-06-09 RX ADMIN — EPHEDRINE SULFATE 15 MG: 50 INJECTION, SOLUTION INTRAMUSCULAR; INTRAVENOUS; SUBCUTANEOUS at 07:06

## 2023-06-09 RX ADMIN — OXYCODONE HYDROCHLORIDE 5 MG: 5 TABLET ORAL at 10:06

## 2023-06-09 RX ADMIN — KETOROLAC TROMETHAMINE 30 MG: 30 INJECTION, SOLUTION INTRAMUSCULAR; INTRAVENOUS at 09:06

## 2023-06-09 RX ADMIN — FENTANYL CITRATE 50 MCG: 50 INJECTION, SOLUTION INTRAMUSCULAR; INTRAVENOUS at 08:06

## 2023-06-09 RX ADMIN — ONDANSETRON 4 MG: 2 INJECTION INTRAMUSCULAR; INTRAVENOUS at 08:06

## 2023-06-09 RX ADMIN — FENTANYL CITRATE 50 MCG: 50 INJECTION, SOLUTION INTRAMUSCULAR; INTRAVENOUS at 10:06

## 2023-06-09 RX ADMIN — FENTANYL CITRATE 50 MCG: 50 INJECTION, SOLUTION INTRAMUSCULAR; INTRAVENOUS at 09:06

## 2023-06-09 RX ADMIN — EPHEDRINE SULFATE 15 MG: 50 INJECTION, SOLUTION INTRAMUSCULAR; INTRAVENOUS; SUBCUTANEOUS at 08:06

## 2023-06-09 RX ADMIN — SUCCINYLCHOLINE CHLORIDE 120 MG: 20 INJECTION, SOLUTION INTRAMUSCULAR; INTRAVENOUS at 07:06

## 2023-06-09 RX ADMIN — ONDANSETRON 4 MG: 2 INJECTION INTRAMUSCULAR; INTRAVENOUS at 09:06

## 2023-06-09 RX ADMIN — PHENYLEPHRINE HYDROCHLORIDE 0.48 MCG/KG/MIN: 10 INJECTION INTRAVENOUS at 07:06

## 2023-06-09 RX ADMIN — LIDOCAINE HYDROCHLORIDE 75 MG: 20 INJECTION, SOLUTION INTRAVENOUS at 07:06

## 2023-06-09 RX ADMIN — VECURONIUM BROMIDE 4 MG: 10 INJECTION, POWDER, LYOPHILIZED, FOR SOLUTION INTRAVENOUS at 09:06

## 2023-06-09 NOTE — DISCHARGE SUMMARY
Discharge Summary  General Surgery      Admit Date: 2023    Discharge Date :2023    Attending Physician: Antonio Mott III     Discharge Physician: Antonio Mott III    Discharged Condition: good    Discharge Diagnosis: Incisional hernia, without obstruction or gangrene [K43.2]    Treatments/Procedures: Procedure(s) (LRB):  ROBOTIC REPAIR, HERNIA, INCISIONAL (N/A)    Hospital Course: Uneventful; Discharged home from Recovery    Significant Diagnostic Studies: none    Disposition: Home or Self Care    Diet: Regular    Follow up: Office 10-14 days    Activity: No heavy lifting till seen in office.    Patient Instructions:   Current Discharge Medication List        START taking these medications    Details   HYDROcodone-acetaminophen (NORCO) 5-325 mg per tablet Take 1 tablet by mouth every 6 (six) hours as needed for Pain.  Qty: 20 tablet, Refills: 0    Comments: Quantity prescribed more than 7 day supply? No           CONTINUE these medications which have NOT CHANGED    Details   amLODIPine (NORVASC) 5 MG tablet Take 5 mg by mouth once daily.      azelastine (ASTELIN) 137 mcg (0.1 %) nasal spray SMARTSI-2 Spray(s) Both Nares Twice Daily PRN      carvedilol (COREG) 6.25 MG tablet Take 6.25 mg by mouth 2 (two) times daily.      cetirizine (ZYRTEC) 10 MG tablet Take 10 mg by mouth once daily.      cholecalciferol, vitamin D3, 125 mcg (5,000 unit) Tab Take 5,000 Units by mouth once daily.      CRESTOR 10 mg tablet Take 10 mg by mouth once daily.      ezetimibe (ZETIA) 10 mg tablet Take 10 mg by mouth once daily.      gabapentin (NEURONTIN) 100 MG capsule Take 1 capsule (100 mg total) by mouth 3 (three) times daily.  Qty: 90 capsule, Refills: 5    Associated Diagnoses: Neuropathy; Multiple sclerosis      olmesartan-hydrochlorothiazide (BENICAR HCT) 20-12.5 mg per tablet       pantoprazole (PROTONIX) 40 MG tablet Take 40 mg by mouth once daily.      potassium chloride (KLOR-CON) 10 MEQ TbSR Take 10 mEq by  mouth.      zinc sulfate (ZINC-15 ORAL) Take 1 tablet by mouth once daily.      amoxicillin-clavulanate 875-125mg (AUGMENTIN) 875-125 mg per tablet Take 1 tablet by mouth 2 (two) times daily.      fluticasone propionate (FLONASE) 50 mcg/actuation nasal spray 1-2 sprays by Each Nostril route once daily.      montelukast (SINGULAIR) 10 mg tablet Take 10 mg by mouth. PRN      nitroGLYCERIN (NITROSTAT) 0.3 MG SL tablet Place under the tongue.      PLENVU 140-9-5.2 gram PPkS Take as directed      pulse oximeter (PULSE OXIMETER) device Use twice daily at 8 AM and 3 PM and record the value in MyChart as directed.  Qty: 1 each, Refills: 0    Comments: This is a NO CHARGE item.  Please override price to zero.  DO NOT PRINT.  NORMAL MODE e-PRESCRIBE ONLY.  Associated Diagnoses: COVID-19 virus infection      ticagrelor (BRILINTA) 60 mg tablet Take 60 mg by mouth 2 (two) times daily.             Discharge Procedure Orders   Diet Adult Regular     Lifting restrictions   Order Comments: No lifting over twenty pounds for six weeks     Remove dressing in 48 hours

## 2023-06-09 NOTE — OP NOTE
Surgery Date: 6/9/2023     Surgeon(s) and Role:     * Antonio Mott III, MD - Primary    Assist Lesley Walden    Pre-op Diagnosis:  Incisional hernia, without obstruction or gangrene [K43.2]    Post-op Diagnosis:  Post-Op Diagnosis Codes:     * Incisional hernia, without obstruction or gangrene [K43.2]    Procedure(s) (LRB):  ROBOTIC REPAIR, HERNIA, INCISIONAL (N/A) - 5.5 -6 cm  defect. Fascial closure and mesh underlay     Anesthesia: General    Operative Findings: Patient brought to the operating room transferred to the operating room table supine position.  She was given general anesthesia and intubated.  Abdomen was prepped and draped.  A small left upper quadrant incision was made.  The abdomen was insufflated using Veress needle.  A 5 mm Visiport was used to gain entrance into the abdomen.  The hernia was located in the mid abdomen.  It measured about 6 cm in diameter.  The robotic camera port was placed in the right lateral abdomen.  This was about 15 cm from the hernia edge. The right robotic port was placed 8 cm lateral to the camera port in the right lower quadrant. The left robotic arm was placed 8 cm lateral in the right upper quadrant.  The robot was docked to the ports.  Instruments were placed into the abdomen.  I then scrubbed out and went to the console. There was a loop of small bowel adhered to the hernia edges. It was taken down shrarply with scissors. The hernia defect was closed with a running 0 permanent barbed suture.  A 15 x 10 cm mesh was chosen for repair.  The mesh was placed into the abdomen.  The mesh was secured to the abdominal wall using running V lock barbed suture.  Several V lock sutures were used in a running fashion to secure the mesh edges to the abdominal wall.  We had good overlap of mesh edges to the hernia defect.  The mesh was sutured securely and flat the abdominal wall.  After that I scrubbed back in.  The ports were removed.  The robot was undocked.  Abdomen was  deflated.  Skin sites were sutured with a running 4 0 Monocryl.  Incisions were bandaged.  She was extubated and brought to recovery room in stable condition.   Complications none  Instrument counts correct   Estimated Blood Loss: 30 mL    Estimated Blood Loss has been documented.         Specimens:   Specimen (24h ago, onward)      None            [id number]

## 2023-06-09 NOTE — ANESTHESIA PROCEDURE NOTES
Intubation    Date/Time: 6/9/2023 7:46 AM  Performed by: Antwon Castrejon CRNA  Authorized by: Renzo Pastrana MD     Intubation:     Induction:  Intravenous    Intubated:  Postinduction    Mask Ventilation:  Easy mask    Attempts:  1    Attempted By:  CRNA    Method of Intubation:  Video laryngoscopy    Blade:  Johns 3    Laryngeal View Grade: Grade I - full view of cords      Difficult Airway Encountered?: No      Complications:  None    Airway Device:  Oral endotracheal tube    Airway Device Size:  7.0    Style/Cuff Inflation:  Cuffed (inflated to minimal occlusive pressure)    Inflation Amount (mL):  5    Tube secured:  21    Secured at:  The lips    Placement Verified By:  Capnometry    Complicating Factors:  None    Findings Post-Intubation:  BS equal bilateral

## 2023-06-09 NOTE — H&P
Patient ID: Tayla Ewing is a 78 y.o. female.     Chief Complaint:      HPI:update. No change to H and P.   Status post sigmoidectomy for complicated diverticulitis 1 year ago.  Surgery noticed bulging at the incision site over the past month or 2.  No obstructive symptoms.  No pain. CT shows moderate size incisional hernia.           Past Medical History:   Diagnosis Date    Back pain      Diverticulitis of colon with perforation 04/05/2022    History of heart artery stent 09/06/2018    History of heart attack 09/06/2018    Hypertension      Intra-abdominal abscess 05/31/2022     post diverticulitis    Multiple sclerosis      Neck pain      Neurogenic bladder 06/24/2015    TMJ (dislocation of temporomandibular joint)              Past Surgical History:   Procedure Laterality Date    BRAIN BIOPSY         2009    CYSTOSCOPY W/ URETERAL STENT PLACEMENT N/A 7/15/2022     Procedure: CYSTOSCOPY, WITH URETERAL STENT INSERTION;  Surgeon: Geovani Holden MD;  Location: [facility];  Service: Urology;  Laterality: N/A;    HYSTERECTOMY        INCISION AND DRAINAGE OF ABSCESS N/A 7/15/2022     Procedure: INCISION AND DRAINAGE, ABSCESS;  Surgeon: Antonio Mott III, MD;  Location: [facility] OR;  Service: General;  Laterality: N/A;    LAPAROSCOPIC SALPINGO-OOPHORECTOMY Left 7/15/2022     Procedure: SALPINGO-OOPHORECTOMY, LAPAROSCOPIC;  Surgeon: Antonio Mott III, MD;  Location: [facility] OR;  Service: General;  Laterality: Left;    LAPAROSCOPIC SIGMOIDECTOMY N/A 7/15/2022     Procedure: COLECTOMY, SIGMOID, LAPAROSCOPIC;  Surgeon: Antonio Mott III, MD;  Location: [facility];  Service: General;  Laterality: N/A;      Review of patient's allergies indicates:  No Known Allergies       Medication List with Changes/Refills   Current Medications     AMLODIPINE (NORVASC) 5 MG TABLET    Take 5 mg by mouth once daily.     AMOXICILLIN-CLAVULANATE 875-125MG (AUGMENTIN) 875-125 MG PER TABLET    Take 1 tablet by mouth 2 (two) times  daily.     CARVEDILOL (COREG) 6.25 MG TABLET    Take 6.25 mg by mouth 2 (two) times daily.     CHOLECALCIFEROL, VITAMIN D3, 125 MCG (5,000 UNIT) TAB    Take 5,000 Units by mouth once daily.     CRESTOR 10 MG TABLET    Take 10 mg by mouth once daily.     EZETIMIBE (ZETIA) 10 MG TABLET    Take 10 mg by mouth once daily.     FLUTICASONE PROPIONATE (FLONASE) 50 MCG/ACTUATION NASAL SPRAY    1-2 sprays by Each Nostril route once daily.     GABAPENTIN (NEURONTIN) 100 MG CAPSULE    Take 1 capsule (100 mg total) by mouth 3 (three) times daily.     MONTELUKAST (SINGULAIR) 10 MG TABLET    Take 10 mg by mouth 2 (two) times daily.     NITROGLYCERIN (NITROSTAT) 0.3 MG SL TABLET    Place under the tongue.     PANTOPRAZOLE (PROTONIX) 40 MG TABLET    Take 40 mg by mouth once daily.     PLENVU 140-9-5.2 GRAM PPKS    Take as directed     POTASSIUM CHLORIDE (KLOR-CON) 10 MEQ TBSR    Take 10 mEq by mouth.     PULSE OXIMETER (PULSE OXIMETER) DEVICE    Use twice daily at 8 AM and 3 PM and record the value in MyChart as directed.     TICAGRELOR (BRILINTA) 60 MG TABLET    Take 60 mg by mouth 2 (two) times daily.     ZINC SULFATE (ZINC-15 ORAL)    Take 1 tablet by mouth once daily.            Family History   Problem Relation Age of Onset    Hypertension Mother        Social History           Socioeconomic History    Marital status:    Tobacco Use    Smoking status: Never    Smokeless tobacco: Never   Substance and Sexual Activity    Alcohol use: No            Review of Systems   Constitutional:  Negative for appetite change, chills, fever and unexpected weight change.   HENT:  Negative for hearing loss, rhinorrhea, sore throat and voice change.    Eyes:  Negative for photophobia and visual disturbance.   Respiratory:  Negative for cough, choking and shortness of breath.    Cardiovascular:  Negative for chest pain, palpitations and leg swelling.   Gastrointestinal:  Negative for abdominal pain, blood in stool, constipation, diarrhea,  nausea and vomiting.        Bulging mid abdomen   Endocrine: Negative for cold intolerance, heat intolerance, polydipsia and polyuria.   Musculoskeletal:  Negative for arthralgias, back pain, joint swelling and neck stiffness.   Skin:  Negative for color change, pallor and rash.   Neurological:  Negative for dizziness, seizures, syncope and headaches.   Hematological:  Negative for adenopathy. Does not bruise/bleed easily.   Psychiatric/Behavioral:  Negative for agitation, behavioral problems and confusion.       Objective:      Objective      Physical Exam  Constitutional:       General: She is awake. She is not in acute distress.     Appearance: Normal appearance. She is well-developed. She is not toxic-appearing.   HENT:      Head: Normocephalic and atraumatic.   Pulmonary:      Effort: No tachypnea, bradypnea, accessory muscle usage or respiratory distress.   Abdominal:      General: There is no distension.      Palpations: Abdomen is soft.      Tenderness: There is no abdominal tenderness.      Hernia: A hernia is present. Hernia is present in the ventral area.           Comments: Hernia involving the midline hand port.  Reducible.  Nontender.   Musculoskeletal:      Cervical back: Neck supple.   Neurological:      Mental Status: She is alert and oriented to person, place, and time.   Psychiatric:         Behavior: Behavior is cooperative.         Assessment/Plan:   Incisional hernia, without obstruction or gangrene  -     Case Request Operating Room: REPAIR, HERNIA, INCISIONAL OR VENTRAL, WITHOUT HISTORY OF PRIOR REPAIR  -     Basic metabolic panel; Future; Expected date: 04/06/2023  -     CBC auto differential; Future; Expected date: 04/06/2023  -     EKG 12-lead; Future  -     X-Ray Chest PA And Lateral; Future; Expected date: 04/06/2023     Ventral hernia without obstruction or gangrene  -     CT Abdomen Pelvis  Without Contrast; Future; Expected date: 04/06/2023     Other orders  -     Full code;  Standing  -     Vital signs; Standing  -     Insert peripheral IV; Standing  -     Diet NPO; Standing  -     IP VTE LOW RISK PATIENT; Standing  -     Place sequential compression device; Standing  -     ceFAZolin (ANCEF) 2 g in dextrose 5 % (D5W) 50 mL IVPB  -     Pulse Oximetry Q4H; Standing  -     Place in Outpatient; Standing     Will plan for incisional hernia repair in June.  Risks and benefits of procedure discussed with the patient.  Will plan for robotic approach.     I discussed the proposed procedures with the patient including risks, benefits, indications, alternatives and special concerns.  The patient appears to understand and agrees to go ahead with surgery.  I have made no promises, warranties or verbal agreements beyond what was discussed above.     No follow-ups on file.

## 2023-06-09 NOTE — PLAN OF CARE
Released from Pacu per Anesthesia when criteria met pain controlled skin w+d No nausea No emesis aaox4 encouraged deep breaths Pt has all belongings in post op    4 sites dermabond on abd had exparell bracelet   Due to void

## 2023-06-09 NOTE — ANESTHESIA POSTPROCEDURE EVALUATION
Anesthesia Post Evaluation    Patient: Tayla Ewing    Procedure(s) Performed: Procedure(s) (LRB):  ROBOTIC REPAIR, HERNIA, INCISIONAL (N/A)    Final Anesthesia Type: general      Patient location during evaluation: PACU  Patient participation: Yes- Able to Participate  Level of consciousness: awake and alert  Post-procedure vital signs: reviewed and stable  Pain management: adequate  Airway patency: patent    PONV status at discharge: No PONV  Anesthetic complications: no      Cardiovascular status: hemodynamically stable  Respiratory status: unassisted and room air  Hydration status: euvolemic  Follow-up not needed.          Vitals Value Taken Time   /58 06/09/23 1206   Temp 36.7 °C (98.1 °F) 06/09/23 1115   Pulse 91 06/09/23 1206   Resp 16 06/09/23 1206   SpO2 96 % 06/09/23 1206         Event Time   Out of Recovery 11:35:00         Pain/Mickey Score: Pain Rating Prior to Med Admin: 6 (6/9/2023 11:00 AM)  Pain Rating Post Med Admin: 0 (6/9/2023 11:54 AM)  Mickey Score: 10 (6/9/2023 12:15 PM)  Modified Mickey Score: 20 (6/9/2023 12:15 PM)

## 2023-06-09 NOTE — PLAN OF CARE
Discharge instructions given to pt and family, verbalized understanding and questions answered. Handouts provided. Belongings given back to pt. IV removed- catheter intact. Dermabond intact. Pt voided without difficulty. Pain tolerable. Discharge via wheelchair.

## 2023-06-09 NOTE — BRIEF OP NOTE
[facility]  Brief Operative Note    SUMMARY     Surgery Date: 6/9/2023     Surgeon(s) and Role:     * Antonio Mott III, MD - Primary    Assisting Surgeon: None    Pre-op Diagnosis:  Incisional hernia, without obstruction or gangrene [K43.2]    Post-op Diagnosis:  Post-Op Diagnosis Codes:     * Incisional hernia, without obstruction or gangrene [K43.2]    Procedure(s) (LRB):  ROBOTIC REPAIR, HERNIA, INCISIONAL (N/A) - 5.5 -6 cm  defect. Fascial closure and mesh underlay     Anesthesia: General    Operative Findings: Patient brought to the operating room transferred to the operating room table supine position.  She was given general anesthesia and intubated.  Abdomen was prepped and draped.  A small left upper quadrant incision was made.  The abdomen was insufflated using Veress needle.  A 5 mm Visiport was used to gain entrance into the abdomen.  The hernia was located in the mid abdomen.  It measured about 6 cm in diameter.  The robotic camera port was placed in the right lateral abdomen.  This was about 15 cm from the hernia edge. The right robotic port was placed 8 cm lateral to the camera port in the right lower quadrant. The left robotic arm was placed 8 cm lateral in the right upper quadrant.  The robot was docked to the ports.  Instruments were placed into the abdomen.  I then scrubbed out and went to the console. There was a loop of small bowel adhered to the hernia edges. It was taken down shrarply with scissors. The hernia defect was closed with a running 0 permanent barbed suture.  A 15 x 10 cm mesh was chosen for repair.  The mesh was placed into the abdomen.  The mesh was secured to the abdominal wall using running V lock barbed suture.  Several V lock sutures were used in a running fashion to secure the mesh edges to the abdominal wall.  We had good overlap of mesh edges to the hernia defect.  The mesh was sutured securely and flat the abdominal wall.  After that I scrubbed  back in.  The ports were removed.  The robot was undocked.  Abdomen was deflated.  Skin sites were sutured with a running 4 0 Monocryl.  Incisions were bandaged.  She was extubated and brought to recovery room in stable condition.   Complications none  Instrument counts correct   Estimated Blood Loss: 30 mL    Estimated Blood Loss has been documented.         Specimens:   Specimen (24h ago, onward)      None            [id number]

## 2023-06-09 NOTE — DISCHARGE INSTRUCTIONS
"Discharge Instructions: After Your Surgery/Procedure  Youve just had surgery. During surgery you were given medicine called anesthesia to keep you relaxed and free of pain. After surgery you may have some pain or nausea. This is common. Here are some tips for feeling better and getting well after surgery.     Stay on schedule with your medication.   Going home  Your doctor or nurse will show you how to take care of yourself when you go home. He or she will also answer your questions. Have an adult family member or friend drive you home.      For your safety we recommend these precaution for the first 24 hours after your procedure:  Do not drive or use heavy equipment.  Do not make important decisions or sign legal papers.  Do not drink alcohol.  Have someone stay with you, if needed. He or she can watch for problems and help keep you safe.  Your concentration, balance, coordination, and judgement may be impaired for many hours after anesthesia.  Use caution when ambulating or standing up.     You may feel weak and "washed out" after anesthesia and surgery.      Subtle residual effects of general anesthesia or sedation with regional / local anesthesia can last more than 24 hours.  Rest for the remainder of the day or longer if your Doctor/Surgeon has advised you to do so.  Although you may feel normal within the first 24 hours, your reflexes and mental ability may be impaired without you realizing it.  You may feel dizzy, lightheaded or sleepy for 24 hours or longer.      Be sure to go to all follow-up visits with your doctor. And rest after your surgery for as long as your doctor tells you to.  Coping with pain  If you have pain after surgery, pain medicine will help you feel better. Take it as told, before pain becomes severe. Also, ask your doctor or pharmacist about other ways to control pain. This might be with heat, ice, or relaxation. And follow any other instructions your surgeon or nurse gives you.  Tips " for taking pain medicine  To get the best relief possible, remember these points:  Pain medicines can upset your stomach. Taking them with a little food may help.  Most pain relievers taken by mouth need at least 20 to 30 minutes to start to work.  Taking medicine on a schedule can help you remember to take it. Try to time your medicine so that you can take it before starting an activity. This might be before you get dressed, go for a walk, or sit down for dinner.  Constipation is a common side effect of pain medicines. Call your doctor before taking any medicines such as laxatives or stool softeners to help ease constipation. Also ask if you should skip any foods. Drinking lots of fluids and eating foods such as fruits and vegetables that are high in fiber can also help. Remember, do not take laxatives unless your surgeon has prescribed them.  Drinking alcohol and taking pain medicine can cause dizziness and slow your breathing. It can even be deadly. Do not drink alcohol while taking pain medicine.  Pain medicine can make you react more slowly to things. Do not drive or run machinery while taking pain medicine.  Your health care provider may tell you to take acetaminophen to help ease your pain. Ask him or her how much you are supposed to take each day. Acetaminophen or other pain relievers may interact with your prescription medicines or other over-the-counter (OTC) drugs. Some prescription medicines have acetaminophen and other ingredients. Using both prescription and OTC acetaminophen for pain can cause you to overdose. Read the labels on your OTC medicines with care. This will help you to clearly know the list of ingredients, how much to take, and any warnings. It may also help you not take too much acetaminophen. If you have questions or do not understand the information, ask your pharmacist or health care provider to explain it to you before you take the OTC medicine.  Managing nausea  Some people have an  upset stomach after surgery. This is often because of anesthesia, pain, or pain medicine, or the stress of surgery. These tips will help you handle nausea and eat healthy foods as you get better. If you were on a special food plan before surgery, ask your doctor if you should follow it while you get better. These tips may help:  Do not push yourself to eat. Your body will tell you when to eat and how much.  Start off with clear liquids and soup. They are easier to digest.  Next try semi-solid foods, such as mashed potatoes, applesauce, and gelatin, as you feel ready.  Slowly move to solid foods. Dont eat fatty, rich, or spicy foods at first.  Do not force yourself to have 3 large meals a day. Instead eat smaller amounts more often.  Take pain medicines with a small amount of solid food, such as crackers or toast, to avoid nausea.     Call your surgeon if  You still have pain an hour after taking medicine. The medicine may not be strong enough.  You feel too sleepy, dizzy, or groggy. The medicine may be too strong.  You have side effects like nausea, vomiting, or skin changes, such as rash, itching, or hives.       If you have obstructive sleep apnea  You were given anesthesia medicine during surgery to keep you comfortable and free of pain. After surgery, you may have more apnea spells because of this medicine and other medicines you were given. The spells may last longer than usual.   At home:  Keep using the continuous positive airway pressure (CPAP) device when you sleep. Unless your health care provider tells you not to, use it when you sleep, day or night. CPAP is a common device used to treat obstructive sleep apnea.  Talk with your provider before taking any pain medicine, muscle relaxants, or sedatives. Your provider will tell you about the possible dangers of taking these medicines.  © 8784-1025 [employer]. [address] [place] [address], [place] [address]. All rights reserved. This information is  not intended as a substitute for professional medical care. Always follow your healthcare professional's instructions.     Post op instructions for prevention of DVT  What is deep vein thrombosis?  Deep vein thrombosis (DVT) is the medical term for blood clots in the deep veins of the leg.  These blood clots can be dangerous.  A DVT can block a blood vessel and keep blood from getting where it needs to go.  Another problem is that the clot can travel to other parts of the body such as the lungs.  A clot that travels to the lungs is called a pulmonary embolus (PE) and can cause serious problems with breathing which can lead to death.  Am I at risk for DVT/PE?  If you are not very active, you are at risk of DVT.  Anyone confined to bed, sitting for long periods of time, recovering from surgery, etc. increases the risk of DVT.  Other risk factors are cancer diagnosis, certain medications, estrogen replacement in any form,older age, obesity, pregnancy, smoking, history of clotting disorders, and dehydration.  How will I know if I have a DVT?  Swelling in the lower leg  Pain  Warmth, redness, hardness or bulging of the vein  If you have any of these symptoms, call your doctors office right away.  Some people will not have any symptoms until the clot moves to the lungs.  What are the symptoms of a PE?  Panting, shortness of breath, or trouble breathing  Sharp, knife-like chest pain when you breathe  Coughing or coughing up blood  Rapid heartbeat  If you have any of these symptoms or get worse quickly, call 911 for emergency treatment.  How can I prevent a DVT?  Avoid long periods of inactivity and dont cross your legs--get up and walk around every hour or so.  Stay active--walking after surgery is highly encouraged.  This means you should get out of the house and walk in the neighborhood.  Going up and down stairs will not impair healing (unless advised against such activity by your doctor).    Drink plenty of  noncaffeinated, nonalcoholic fluids each day to prevent dehydration.  Wear special support stockings, if they have been advised by your doctor.  If you travel, stop at least once an hour and walk around.  Avoid smoking (assistance with stopping is available through your healthcare provider)  Always notify your doctor if you are not able to follow the post operative instructions that are given to you at the time of discharge.  It may be necessary to prescribe one of the medications available to prevent DVT.     Using an Incentive Spirometer    An incentive spirometer is a device that helps you do deep breathing exercises. These exercises expand your lungs, aid in circulation, and help prevent pneumonia. Deep breathing exercises also help you breathe better and improve the function of your lungs by:  Keeping your lungs clear  Strengthening your breathing muscles  Helping prevent respiratory complications or problems  The incentive spirometer gives you a way to take an active part in recover. A nurse or therapist will teach you breathing exercises. To do these exercises, you will breathe in through your mouth and not your nose. The incentive spirometer only works correctly if you breathe in through your mouth.  Steps to clear lungs  Step 1. Exhale normally. Then, inhale normally.  Relax and breathe out.  Step 2. Place your lips tightly around the mouthpiece.  Make sure the device is upright and not tilted.  Step 3. Inhale as much air as you can through the mouthpiece (don't breath through your nose).  Inhale slowly and deeply.  Hold your breath long enough to keep the balls or disk raised for at least 3 to 5 seconds, or as instructed by your healthcare provider.  Some spirometers have an indicator to let you know that you are breathing in too fast. If the indicator goes off, breathe in more slowly.  Step 4. Repeat the exercise regularly.  Do this exercise every hour while you're awake, or as instructed by your  healthcare provider.  If you were taught deep breathing and coughing exercises, do them regularly as instructed by your healthcare provider.      We hope your stay was comfortable as you heal now, mend and rest.    For we have enjoyed taking care of you by giving your our best.    And as you get better, by regaining your health and strength;   We count it as a privilege to have served you and hope your time at [facility] was well spent.      Thank  You!!!

## 2023-06-09 NOTE — PLAN OF CARE
Patient ready for surgery. Surgery and anesthesia consents signed. Educated on incentive spirometer use. Belongings in pre-op locker. Spouse set up with text message notifications.

## 2023-06-09 NOTE — TRANSFER OF CARE
Anesthesia Transfer of Care Note    Patient: Tayla Ewing    Procedure(s) Performed: Procedure(s) (LRB):  ROBOTIC REPAIR, HERNIA, INCISIONAL (N/A)    Patient location: PACU    Anesthesia Type: general    Transport from OR: Transported from OR on 2-3 L/min O2 by NC with adequate spontaneous ventilation    Post pain: adequate analgesia    Post assessment: no apparent anesthetic complications    Post vital signs: stable    Level of consciousness: awake    Nausea/Vomiting: no nausea/vomiting    Complications: none    Transfer of care protocol was followed      Last vitals:   Visit Vitals  /65   Breastfeeding No

## 2023-06-15 ENCOUNTER — PATIENT OUTREACH (OUTPATIENT)
Dept: ADMINISTRATIVE | Facility: HOSPITAL | Age: 78
End: 2023-06-15
Payer: MEDICARE

## 2023-06-15 NOTE — PROGRESS NOTES
"Health Maintenance Due   Topic Date Due    Hepatitis C Screening  Never done    DEXA Scan  Never done     Immunizations - reviewed and updated  Care Everywhere - triggered  Care Teams - updated  [facility]. Patient confirmed that Dr. Mueller is still her PCP, but she "may switch." Patient just recently had surgery, states "will call back at a later time to schedule annual if she stays with Dr. Mueller." Verbalized understanding.           "

## 2023-06-22 ENCOUNTER — OFFICE VISIT (OUTPATIENT)
Dept: SURGERY | Facility: CLINIC | Age: 78
End: 2023-06-22
Payer: MEDICARE

## 2023-06-22 VITALS — SYSTOLIC BLOOD PRESSURE: 122 MMHG | TEMPERATURE: 98 F | HEART RATE: 92 BPM | DIASTOLIC BLOOD PRESSURE: 70 MMHG

## 2023-06-22 DIAGNOSIS — K43.2 INCISIONAL HERNIA, WITHOUT OBSTRUCTION OR GANGRENE: Primary | ICD-10-CM

## 2023-06-22 PROCEDURE — 99212 OFFICE O/P EST SF 10 MIN: CPT | Mod: S$PBB,POP,, | Performed by: SURGERY

## 2023-06-22 PROCEDURE — 99212 OFFICE O/P EST SF 10 MIN: CPT | Mod: PBBFAC,PN | Performed by: SURGERY

## 2023-06-22 PROCEDURE — 99999 PR PBB SHADOW E&M-EST. PATIENT-LVL II: CPT | Mod: PBBFAC,,, | Performed by: SURGERY

## 2023-06-22 RX ORDER — ROSUVASTATIN CALCIUM 5 MG/1
TABLET, COATED ORAL
COMMUNITY
Start: 2023-05-03

## 2023-07-11 ENCOUNTER — PATIENT OUTREACH (OUTPATIENT)
Dept: ADMINISTRATIVE | Facility: HOSPITAL | Age: 78
End: 2023-07-11
Payer: MEDICARE

## 2023-07-11 NOTE — PROGRESS NOTES
After reviewing patient's chart, patient has not been seen by Dr. Mueller since 2016. Patient would need to re-establish care.    Dr. Mueller removed as patient's PCP.

## 2023-07-21 ENCOUNTER — PATIENT MESSAGE (OUTPATIENT)
Dept: PSYCHIATRY | Facility: CLINIC | Age: 78
End: 2023-07-21
Payer: MEDICARE

## 2023-08-15 DIAGNOSIS — E05.90 PRETIBIAL MYXEDEMA: Primary | ICD-10-CM

## 2023-08-24 DIAGNOSIS — K57.20 DIVERTICULITIS OF COLON WITH PERFORATION: ICD-10-CM

## 2023-08-24 DIAGNOSIS — I50.9 CHF (CONGESTIVE HEART FAILURE): ICD-10-CM

## 2023-08-24 DIAGNOSIS — R10.31 ABDOMINAL PAIN, RIGHT LOWER QUADRANT: Primary | ICD-10-CM

## 2023-08-24 DIAGNOSIS — K42.9 UMBILICAL HERNIA: ICD-10-CM

## 2023-08-25 ENCOUNTER — HOSPITAL ENCOUNTER (OUTPATIENT)
Dept: RADIOLOGY | Facility: HOSPITAL | Age: 78
Discharge: HOME | End: 2023-08-25
Attending: INTERNAL MEDICINE
Payer: MEDICARE

## 2023-08-25 DIAGNOSIS — E05.90 PRETIBIAL MYXEDEMA: ICD-10-CM

## 2023-08-25 DIAGNOSIS — K42.9 UMBILICAL HERNIA: ICD-10-CM

## 2023-08-25 DIAGNOSIS — I50.9 CHF (CONGESTIVE HEART FAILURE): ICD-10-CM

## 2023-08-25 DIAGNOSIS — K57.20 DIVERTICULITIS OF COLON WITH PERFORATION: ICD-10-CM

## 2023-08-25 DIAGNOSIS — R10.31 ABDOMINAL PAIN, RIGHT LOWER QUADRANT: ICD-10-CM

## 2023-08-25 PROCEDURE — 76536 US EXAM OF HEAD AND NECK: CPT | Mod: TC,PO

## 2023-08-25 PROCEDURE — 74018 RADEX ABDOMEN 1 VIEW: CPT | Mod: TC,PO

## 2023-09-27 ENCOUNTER — LAB VISIT (OUTPATIENT)
Dept: LAB | Facility: HOSPITAL | Age: 78
End: 2023-09-27
Attending: INTERNAL MEDICINE
Payer: MEDICARE

## 2023-09-27 DIAGNOSIS — E05.90 PRETIBIAL MYXEDEMA: Primary | ICD-10-CM

## 2023-09-27 LAB
T4 FREE SERPL-MCNC: 1.19 NG/DL (ref 0.71–1.51)
TSH SERPL DL<=0.005 MIU/L-ACNC: 0.01 UIU/ML (ref 0.34–5.6)

## 2023-09-27 PROCEDURE — 84439 ASSAY OF FREE THYROXINE: CPT | Performed by: INTERNAL MEDICINE

## 2023-09-27 PROCEDURE — 84443 ASSAY THYROID STIM HORMONE: CPT | Performed by: INTERNAL MEDICINE

## 2023-09-27 PROCEDURE — 36415 COLL VENOUS BLD VENIPUNCTURE: CPT | Performed by: INTERNAL MEDICINE

## 2023-09-27 PROCEDURE — 84481 FREE ASSAY (FT-3): CPT | Performed by: INTERNAL MEDICINE

## 2023-09-29 LAB — T3FREE SERPL-MCNC: 3.6 PG/ML (ref 2–4.4)

## 2024-01-11 NOTE — ANESTHESIA PREPROCEDURE EVALUATION
06/09/2023  Tayla Ewing is a 78 y.o., female.      Pre-op Assessment    I have reviewed the Patient Summary Reports.     I have reviewed the Nursing Notes. I have reviewed the NPO Status.   I have reviewed the Medications.     Review of Systems  Anesthesia Hx:  No problems with previous Anesthesia    Social:  Non-Smoker    Hematology/Oncology:  Hematology Normal   Oncology Normal     EENT/Dental:EENT/Dental Normal   Cardiovascular:   Hypertension Past MI CABG/stent     Pulmonary:  Pulmonary Normal    Hepatic/GI:   Incisional hernia    Neurological:   Multiple sclerosis        Physical Exam  General: Well nourished, Cooperative, Alert and Oriented    Airway:  Mallampati: II   Mouth Opening: Normal  TM Distance: Normal  Tongue: Normal  Neck ROM: Normal ROM    Dental:  Intact        Anesthesia Plan  Type of Anesthesia, risks & benefits discussed:    Anesthesia Type: Gen ETT  Intra-op Monitoring Plan: Standard ASA Monitors  Post Op Pain Control Plan: multimodal analgesia and IV/PO Opioids PRN  Induction:  IV  Airway Plan: Direct and Video, Post-Induction  Informed Consent: Informed consent signed with the Patient and all parties understand the risks and agree with anesthesia plan.  All questions answered. Patient consented to blood products? Yes  ASA Score: 3    Ready For Surgery From Anesthesia Perspective.     .       Dr. Lepe has ordered a PET scan. Review provided PET scan sheet for special instructions.     Return for follow up after PET scan to discuss results and plan.

## 2024-01-22 ENCOUNTER — PATIENT MESSAGE (OUTPATIENT)
Dept: PSYCHIATRY | Facility: CLINIC | Age: 79
End: 2024-01-22
Payer: MEDICARE

## 2024-04-17 NOTE — ASSESSMENT & PLAN NOTE
Virtual Regular Visit    Verification of patient location:    Patient is located at Home in the following state in which I hold an active license [place]      Assessment/Plan:    Problem List Items Addressed This Visit       Moderate mixed bipolar II disorder (HCC) - Primary    Generalized anxiety disorder    PTSD (post-traumatic stress disorder)    Alcohol abuse, in remission    Marijuana use (Chronic)    Cigarette nicotine dependence without complication    Tobacco abuse       Goals addressed in session: Goal 1 and Goal 2          Reason for visit is No chief complaint on file.       Encounter provider Isabel Watts LPC    Provider located at [facility]  [facility] [place] [id number] [phone]      Recent Visits  Date Type Provider Dept   04/12/24 Telemedicine Isabel Watts LPC [facility] [place]   04/10/24 Telephone Isabel aWtts LPC [facility] [place]   04/10/24 Telephone Isabel Watts LPC [facility] [place]   Showing recent visits within past 7 days and meeting all other requirements  Future Appointments  No visits were found meeting these conditions.  Showing future appointments within next 150 days and meeting all other requirements       The patient was identified by name and date of birth. Marie Portillo was informed that this is a telemedicine visit and that the visit is being conducted throughthe Epic Embedded platform. She agrees to proceed..  My office door was closed. No one else was in the room.  She acknowledged consent and understanding of privacy and security of the video platform. The patient has agreed to participate and understands they can discontinue the visit at any time.    Patient is aware this is a billable service.     Subjective  Marie Portillo is a 56 y.o. female  .      HPI     Past Medical History:   Diagnosis Date    Bilateral carpal  Getting better  Cl diet  Conservative management for now   tunnel syndrome 08/20/2019    Carpal tunnel syndrome of right wrist 10/30/2019    Added automatically from request for surgery [id number]    Chlamydia     COPD (chronic obstructive pulmonary disease) (HCC)     Depression     Hypertension     Varicella        Past Surgical History:   Procedure Laterality Date    BREAST BIOPSY Left 20yrs ago benign    CERVICAL SPINE SURGERY      EGD      HERNIA REPAIR      OOPHORECTOMY Right     OK NDSC WRST SURG W/RLS TRANSVRS CARPL LIGM Right 01/21/2020    Procedure: RELEASE CARPAL TUNNEL ENDOSCOPIC;  Surgeon: Sam Nguyen MD;  Location: [facility];  Service: Orthopedics    OK NDSC WRST SURG W/RLS TRANSVRS CARPL LIGM Left 01/28/2020    Procedure: RELEASE CARPAL TUNNEL ENDOSCOPIC;  Surgeon: Sam Nguyen MD;  Location: [facility];  Service: Orthopedics       Current Outpatient Medications   Medication Sig Dispense Refill    Acetaminophen 500 MG Take 2 capsules by mouth every 4 (four) hours as needed for mild pain      albuterol (PROVENTIL HFA,VENTOLIN HFA) 90 mcg/act inhaler inhale 2 puffs by mouth and INTO THE LUNGS every 6 hours IF NEEDED FOR WHEEZING. 8.5 g 2    buPROPion (WELLBUTRIN XL) 300 mg 24 hr tablet Take 1 tablet (300 mg total) by mouth daily 30 tablet 1    cholecalciferol (VITAMIN D3) 400 units tablet Take 400 Units by mouth daily      fluticasone (FLONASE) 50 mcg/act nasal spray instill 1 spray into each nostril once daily 16 g 2    guaiFENesin (MUCINEX) 600 mg 12 hr tablet Take 2 tablets (1,200 mg total) by mouth every 12 (twelve) hours 60 tablet 3    ibuprofen (MOTRIN) 200 mg tablet Take 200 mg by mouth every 6 (six) hours as needed for mild pain      ipratropium-albuterol (DUO-NEB) 0.5-2.5 mg/3 mL nebulizer solution Take 3 mL by nebulization 4 (four) times a day 360 mL 3    lamoTRIgine (LaMICtal) 200 MG tablet Take 1 tablet (200 mg total) by mouth daily 30 tablet 1    levocetirizine (XYZAL) 5 MG tablet take 1 tablet by mouth every evening 30 tablet 4     methocarbamol (ROBAXIN) 500 mg tablet Take 500 mg by mouth 2 (two) times a day PRN      metoprolol succinate (TOPROL-XL) 25 mg 24 hr tablet Take 1 tablet (25 mg total) by mouth daily 30 tablet 2    nicotine (NICODERM CQ) 21 mg/24 hr TD 24 hr patch Place 1 patch on the skin over 24 hours every 24 hours (Patient not taking: Reported on 4/9/2024) 28 patch 0    nicotine polacrilex (COMMIT) 4 MG lozenge Apply 1 lozenge (4 mg total) to the mouth or throat as needed for smoking cessation (Patient not taking: Reported on 4/9/2024) 100 each 0    omeprazole (PriLOSEC) 20 mg delayed release capsule take 1 capsule by mouth twice a day 60 capsule 5    senna-docusate sodium (SENOKOT-S) 8.6-50 mg per tablet Take 3 tablets by mouth as needed        Stiolto Respimat 2.5-2.5 MCG/ACT inhaler inhale 2 puffs by mouth and INTO THE LUNGS once daily 4 g 11     No current facility-administered medications for this visit.        No Known Allergies    Review of Systems    Video Exam    There were no vitals filed for this visit.    Physical Exam

## 2024-05-02 ENCOUNTER — PATIENT MESSAGE (OUTPATIENT)
Dept: PSYCHIATRY | Facility: CLINIC | Age: 79
End: 2024-05-02
Payer: MEDICARE

## 2024-05-08 DIAGNOSIS — E04.2 NONTOXIC MULTINODULAR GOITER: Primary | ICD-10-CM

## 2024-05-30 NOTE — PROGRESS NOTES
Subjective:       Patient ID: Tayla Ewing is a 79 y.o. female.    Chief Complaint: Post-op Evaluation      HPI:  Status post robotic incisional hernia repair.  Feeling well.  No complaints.  Healing well.  Tolerating diet.  No fevers.    Review of Systems    Objective:      Physical Exam  Constitutional:       General: She is not in acute distress.  Pulmonary:      Effort: Pulmonary effort is normal. No respiratory distress.   Abdominal:      General: There is no distension.      Palpations: Abdomen is soft.      Tenderness: There is no abdominal tenderness. There is no guarding or rebound.      Hernia: No hernia is present.   Skin:     Comments: Incisions are clean, dry and intact  There is no evidence of infection, hematoma or seroma    Neurological:      Mental Status: She is alert and oriented to person, place, and time.   Psychiatric:         Behavior: Behavior is cooperative.         Assessment/Plan:   Incisional hernia, without obstruction or gangrene      Status post robotic incisional hernia repair.  Doing well.  No evidence of infection.  Return to clinic p.r.n..

## 2024-06-03 ENCOUNTER — HOSPITAL ENCOUNTER (OUTPATIENT)
Dept: RADIOLOGY | Facility: HOSPITAL | Age: 79
Discharge: HOME | End: 2024-06-03
Attending: INTERNAL MEDICINE
Payer: MEDICARE

## 2024-06-03 DIAGNOSIS — E04.2 NONTOXIC MULTINODULAR GOITER: ICD-10-CM

## 2024-06-03 PROCEDURE — 76536 US EXAM OF HEAD AND NECK: CPT | Mod: TC,PO

## 2024-06-03 PROCEDURE — 76536 US EXAM OF HEAD AND NECK: CPT | Mod: 26,,, | Performed by: RADIOLOGY

## 2024-07-25 ENCOUNTER — PATIENT MESSAGE (OUTPATIENT)
Dept: PSYCHIATRY | Facility: CLINIC | Age: 79
End: 2024-07-25
Payer: MEDICARE

## 2024-08-05 ENCOUNTER — PATIENT MESSAGE (OUTPATIENT)
Dept: PSYCHIATRY | Facility: CLINIC | Age: 79
End: 2024-08-05
Payer: MEDICARE

## 2024-09-19 ENCOUNTER — PATIENT MESSAGE (OUTPATIENT)
Dept: PRIMARY CARE CLINIC | Facility: CLINIC | Age: 79
End: 2024-09-19
Payer: MEDICARE

## 2024-12-16 ENCOUNTER — PATIENT MESSAGE (OUTPATIENT)
Dept: PSYCHIATRY | Facility: CLINIC | Age: 79
End: 2024-12-16
Payer: MEDICARE

## 2025-02-06 ENCOUNTER — HOSPITAL ENCOUNTER (EMERGENCY)
Facility: HOSPITAL | Age: 80
Discharge: HOME | End: 2025-02-06
Attending: EMERGENCY MEDICINE
Payer: MEDICARE

## 2025-02-06 VITALS
BODY MASS INDEX: 30.3 KG/M2 | SYSTOLIC BLOOD PRESSURE: 130 MMHG | TEMPERATURE: 98 F | WEIGHT: 171 LBS | HEIGHT: 63 IN | HEART RATE: 70 BPM | DIASTOLIC BLOOD PRESSURE: 60 MMHG | OXYGEN SATURATION: 100 % | RESPIRATION RATE: 18 BRPM

## 2025-02-06 DIAGNOSIS — M54.50 ACUTE RIGHT-SIDED LOW BACK PAIN WITHOUT SCIATICA: Primary | ICD-10-CM

## 2025-02-06 DIAGNOSIS — M54.9 BACK PAIN: ICD-10-CM

## 2025-02-06 LAB
ALBUMIN SERPL BCP-MCNC: 4.1 G/DL (ref 3.5–5.2)
ALP SERPL-CCNC: 75 U/L (ref 55–135)
ALT SERPL W/O P-5'-P-CCNC: 16 U/L (ref 10–44)
ANION GAP SERPL CALC-SCNC: 7 MMOL/L (ref 8–16)
AST SERPL-CCNC: 15 U/L (ref 10–40)
BASOPHILS # BLD AUTO: 0.03 K/UL (ref 0–0.2)
BASOPHILS NFR BLD: 0.5 % (ref 0–1.9)
BILIRUB SERPL-MCNC: 0.6 MG/DL (ref 0.1–1)
BILIRUB UR QL STRIP: NEGATIVE
BUN SERPL-MCNC: 18 MG/DL (ref 8–23)
CALCIUM SERPL-MCNC: 10 MG/DL (ref 8.7–10.5)
CHLORIDE SERPL-SCNC: 107 MMOL/L (ref 95–110)
CLARITY UR: CLEAR
CO2 SERPL-SCNC: 24 MMOL/L (ref 23–29)
COLOR UR: YELLOW
CREAT SERPL-MCNC: 0.7 MG/DL (ref 0.5–1.4)
DIFFERENTIAL METHOD BLD: ABNORMAL
EOSINOPHIL # BLD AUTO: 0.2 K/UL (ref 0–0.5)
EOSINOPHIL NFR BLD: 2.4 % (ref 0–8)
ERYTHROCYTE [DISTWIDTH] IN BLOOD BY AUTOMATED COUNT: 12.4 % (ref 11.5–14.5)
EST. GFR  (NO RACE VARIABLE): >60 ML/MIN/1.73 M^2
GLUCOSE SERPL-MCNC: 119 MG/DL (ref 70–110)
GLUCOSE UR QL STRIP: NEGATIVE
HCT VFR BLD AUTO: 35.7 % (ref 37–48.5)
HCV AB SERPL QL IA: NEGATIVE
HGB BLD-MCNC: 11.6 G/DL (ref 12–16)
HGB UR QL STRIP: NEGATIVE
HIV 1+2 AB+HIV1 P24 AG SERPL QL IA: NEGATIVE
IMM GRANULOCYTES # BLD AUTO: 0.02 K/UL (ref 0–0.04)
IMM GRANULOCYTES NFR BLD AUTO: 0.3 % (ref 0–0.5)
KETONES UR QL STRIP: NEGATIVE
LEUKOCYTE ESTERASE UR QL STRIP: NEGATIVE
LIPASE SERPL-CCNC: 39 U/L (ref 4–60)
LYMPHOCYTES # BLD AUTO: 2 K/UL (ref 1–4.8)
LYMPHOCYTES NFR BLD: 30.8 % (ref 18–48)
MCH RBC QN AUTO: 29.6 PG (ref 27–31)
MCHC RBC AUTO-ENTMCNC: 32.5 G/DL (ref 32–36)
MCV RBC AUTO: 91 FL (ref 82–98)
MONOCYTES # BLD AUTO: 0.6 K/UL (ref 0.3–1)
MONOCYTES NFR BLD: 8.4 % (ref 4–15)
NEUTROPHILS # BLD AUTO: 3.8 K/UL (ref 1.8–7.7)
NEUTROPHILS NFR BLD: 57.6 % (ref 38–73)
NITRITE UR QL STRIP: NEGATIVE
NRBC BLD-RTO: 0 /100 WBC
OHS QRS DURATION: 96 MS
OHS QTC CALCULATION: 447 MS
PH UR STRIP: 7 [PH] (ref 5–8)
PLATELET # BLD AUTO: 230 K/UL (ref 150–450)
PMV BLD AUTO: 9.8 FL (ref 9.2–12.9)
POTASSIUM SERPL-SCNC: 3.7 MMOL/L (ref 3.5–5.1)
PROT SERPL-MCNC: 7.3 G/DL (ref 6–8.4)
PROT UR QL STRIP: NEGATIVE
RBC # BLD AUTO: 3.92 M/UL (ref 4–5.4)
SODIUM SERPL-SCNC: 138 MMOL/L (ref 136–145)
SP GR UR STRIP: 1 (ref 1–1.03)
TROPONIN I SERPL HS-MCNC: 4.5 PG/ML (ref 0–14.9)
URN SPEC COLLECT METH UR: NORMAL
UROBILINOGEN UR STRIP-ACNC: NEGATIVE EU/DL
WBC # BLD AUTO: 6.55 K/UL (ref 3.9–12.7)

## 2025-02-06 PROCEDURE — 93005 ELECTROCARDIOGRAM TRACING: CPT | Performed by: INTERNAL MEDICINE

## 2025-02-06 PROCEDURE — 99285 EMERGENCY DEPT VISIT HI MDM: CPT | Mod: 25

## 2025-02-06 PROCEDURE — 25500020 PHARM REV CODE 255: Performed by: EMERGENCY MEDICINE

## 2025-02-06 PROCEDURE — 87389 HIV-1 AG W/HIV-1&-2 AB AG IA: CPT | Performed by: EMERGENCY MEDICINE

## 2025-02-06 PROCEDURE — 80053 COMPREHEN METABOLIC PANEL: CPT | Performed by: EMERGENCY MEDICINE

## 2025-02-06 PROCEDURE — 86803 HEPATITIS C AB TEST: CPT | Performed by: EMERGENCY MEDICINE

## 2025-02-06 PROCEDURE — 81003 URINALYSIS AUTO W/O SCOPE: CPT | Performed by: EMERGENCY MEDICINE

## 2025-02-06 PROCEDURE — 85025 COMPLETE CBC W/AUTO DIFF WBC: CPT | Performed by: EMERGENCY MEDICINE

## 2025-02-06 PROCEDURE — 25000003 PHARM REV CODE 250: Performed by: EMERGENCY MEDICINE

## 2025-02-06 PROCEDURE — 83690 ASSAY OF LIPASE: CPT | Performed by: EMERGENCY MEDICINE

## 2025-02-06 PROCEDURE — 84484 ASSAY OF TROPONIN QUANT: CPT | Performed by: EMERGENCY MEDICINE

## 2025-02-06 PROCEDURE — 93010 ELECTROCARDIOGRAM REPORT: CPT | Mod: ,,, | Performed by: INTERNAL MEDICINE

## 2025-02-06 RX ORDER — ACETAMINOPHEN 500 MG
1000 TABLET ORAL
Status: COMPLETED | OUTPATIENT
Start: 2025-02-06 | End: 2025-02-06

## 2025-02-06 RX ORDER — METHOCARBAMOL 500 MG/1
1000 TABLET, FILM COATED ORAL 3 TIMES DAILY
Qty: 60 TABLET | Refills: 0 | Status: SHIPPED | OUTPATIENT
Start: 2025-02-06 | End: 2025-02-16

## 2025-02-06 RX ORDER — METHOCARBAMOL 500 MG/1
1000 TABLET, FILM COATED ORAL
Status: COMPLETED | OUTPATIENT
Start: 2025-02-06 | End: 2025-02-06

## 2025-02-06 RX ORDER — LIDOCAINE 50 MG/G
1 PATCH TOPICAL DAILY
Qty: 14 PATCH | Refills: 0 | Status: SHIPPED | OUTPATIENT
Start: 2025-02-06 | End: 2025-02-20

## 2025-02-06 RX ADMIN — IOHEXOL 100 ML: 350 INJECTION, SOLUTION INTRAVENOUS at 03:02

## 2025-02-06 RX ADMIN — METHOCARBAMOL 1000 MG: 500 TABLET ORAL at 03:02

## 2025-02-06 RX ADMIN — ACETAMINOPHEN 1000 MG: 500 TABLET, FILM COATED ORAL at 03:02

## 2025-02-06 NOTE — DISCHARGE INSTRUCTIONS
RETURN TO EMERGENCY DEPARTMENT WITHOUT FAIL, IF YOUR SYMPTOMS WORSEN, IF YOU GET NEW OR DIFFERENT SYMPTOMS, IF YOU ARE UNABLE TO FOLLOW UP AS DIRECTED, OR IF YOU HAVE ANY CONCERNS OR WORRIES.    Take medication as directed.  Follow up with primary care provider

## 2025-02-06 NOTE — ED PROVIDER NOTES
Encounter Date: 2/6/2025       History     Chief Complaint   Patient presents with    Abdominal Pain     Pt c/o lower abdominal pain and back pain x 1 week. Now c/o burning when she urinates and itching in her vaginal area x 1 week.     This is a 79-year-old female with past medical history of hypertension, coronary artery disease, MS, neurogenic bladder, back pain chronic who presents emergency department with back pain, vaginal complaints and suprapubic abdominal pain.  Patient says for the last couple of days she has had issues with suprapubic abdominal pain and she thinks that she may have a yeast infection.  She says her vagina itches and sometimes it hurts when she urinates.  Says that she has has some back pain mostly in the lower back region sometimes she will feel it shoot down her right leg.  She does not have any bowel or bladder incontinence.  She does not have any fevers or chills.  She does not have any history of any fall trauma or injury.  She denies any saddle anesthesia.  She denies any heavy lifting.      Review of patient's allergies indicates:  No Known Allergies  Past Medical History:   Diagnosis Date    Back pain     Diverticulitis of colon with perforation 04/05/2022    History of heart artery stent 09/06/2018    History of heart attack 09/06/2018    Hypertension     Intra-abdominal abscess 05/31/2022    post diverticulitis    Multiple sclerosis     Myocardial infarction     Neck pain     Neurogenic bladder 06/24/2015    TMJ (dislocation of temporomandibular joint)      Past Surgical History:   Procedure Laterality Date    BRAIN BIOPSY      2009    CORONARY ANGIOPLASTY WITH STENT PLACEMENT      1 stent    CYSTOSCOPY W/ URETERAL STENT PLACEMENT N/A 07/15/2022    Procedure: CYSTOSCOPY, WITH URETERAL STENT INSERTION;  Surgeon: Geovani Holden MD;  Location: [facility];  Service: Urology;  Laterality: N/A;    HYSTERECTOMY      INCISION AND DRAINAGE OF ABSCESS N/A 07/15/2022    Procedure:  INCISION AND DRAINAGE, ABSCESS;  Surgeon: Antonio Mott III, MD;  Location: [facility] OR;  Service: General;  Laterality: N/A;    LAPAROSCOPIC SALPINGO-OOPHORECTOMY Left 07/15/2022    Procedure: SALPINGO-OOPHORECTOMY, LAPAROSCOPIC;  Surgeon: Antonio Mott III, MD;  Location: [facility] OR;  Service: General;  Laterality: Left;    LAPAROSCOPIC SIGMOIDECTOMY N/A 07/15/2022    Procedure: COLECTOMY, SIGMOID, LAPAROSCOPIC;  Surgeon: Antonio Mott III, MD;  Location: [facility] OR;  Service: General;  Laterality: N/A;    ROBOT-ASSISTED LAPAROSCOPIC REPAIR OF INCISIONAL HERNIA N/A 6/9/2023    Procedure: ROBOTIC REPAIR, HERNIA, INCISIONAL;  Surgeon: Antonio Mott III, MD;  Location: [facility] OR;  Service: General;  Laterality: N/A;     Family History   Problem Relation Name Age of Onset    Hypertension Mother       Social History     Tobacco Use    Smoking status: Never    Smokeless tobacco: Never   Substance Use Topics    Alcohol use: Yes     Comment: rarely     Review of Systems   Constitutional:  Negative for chills and fever.   HENT:  Negative for sore throat.    Respiratory:  Negative for shortness of breath.    Cardiovascular:  Negative for chest pain.   Gastrointestinal:  Positive for abdominal pain. Negative for diarrhea, nausea and vomiting.   Genitourinary:  Positive for dysuria. Negative for flank pain and vaginal discharge.        Vaginal itching   Musculoskeletal:  Positive for back pain. Negative for neck pain.   Skin:  Negative for rash.   Neurological:  Negative for weakness and headaches.   Hematological:  Does not bruise/bleed easily.   All other systems reviewed and are negative.      Physical Exam     Initial Vitals [02/06/25 1047]   BP Pulse Resp Temp SpO2   (!) 160/73 71 18 97.5 °F (36.4 °C) 97 %      MAP       --         Physical Exam    Nursing note and vitals reviewed.  Constitutional: She appears well-developed and well-nourished. No distress.   HENT:   Head: Normocephalic and atraumatic.  Mouth/Throat: No oropharyngeal exudate.   Eyes: Conjunctivae and EOM are normal. Pupils are equal, round, and reactive to light.   Neck: Neck supple. No tracheal deviation present.   Normal range of motion.  Cardiovascular:  Normal rate, regular rhythm, normal heart sounds and intact distal pulses.           No murmur heard.  Pulmonary/Chest: Breath sounds normal. No stridor. No respiratory distress. She has no wheezes. She has no rhonchi. She has no rales.   Abdominal: Abdomen is soft. She exhibits no distension. There is no abdominal tenderness. There is no rebound.   Musculoskeletal:         General: No tenderness or edema. Normal range of motion.      Cervical back: Normal range of motion and neck supple.      Comments: There is tenderness to the low back of the right lower back region in the left lower back region.  There is a negative straight leg test bilaterally.  There was no saddle anesthesia.  Normal strength 5/5 in bilateral lower extremities.     Neurological: She is alert and oriented to person, place, and time. She has normal strength. No cranial nerve deficit or sensory deficit.   Skin: Skin is warm and dry. Capillary refill takes less than 2 seconds. No rash noted. No erythema. No pallor.   No rash to suggest herpes zoster on the lower back region.   Psychiatric: She has a normal mood and affect. Her behavior is normal. Judgment and thought content normal.         ED Course   Procedures  Labs Reviewed   CBC W/ AUTO DIFFERENTIAL - Abnormal       Result Value    WBC 6.55      RBC 3.92 (*)     Hemoglobin 11.6 (*)     Hematocrit 35.7 (*)     MCV 91      MCH 29.6      MCHC 32.5      RDW 12.4      Platelets 230      MPV 9.8      Immature Granulocytes 0.3      Gran # (ANC) 3.8      Immature Grans (Abs) 0.02      Lymph # 2.0      Mono # 0.6      Eos # 0.2      Baso # 0.03      nRBC 0      Gran % 57.6      Lymph % 30.8      Mono % 8.4      Eosinophil % 2.4      Basophil % 0.5      Differential Method  Automated     COMPREHENSIVE METABOLIC PANEL - Abnormal    Sodium 138      Potassium 3.7      Chloride 107      CO2 24      Glucose 119 (*)     BUN 18      Creatinine 0.7      Calcium 10.0      Total Protein 7.3      Albumin 4.1      Total Bilirubin 0.6      Alkaline Phosphatase 75      AST 15      ALT 16      eGFR >60.0      Anion Gap 7 (*)    TROPONIN I HIGH SENSITIVITY    Troponin I High Sensitivity 4.5     URINALYSIS, REFLEX TO URINE CULTURE    Specimen UA Urine, Clean Catch      Color, UA Yellow      Appearance, UA Clear      pH, UA 7.0      Specific Gravity, UA 1.005      Protein, UA Negative      Glucose, UA Negative      Ketones, UA Negative      Bilirubin (UA) Negative      Occult Blood UA Negative      Nitrite, UA Negative      Urobilinogen, UA Negative      Leukocytes, UA Negative      Narrative:     Specimen Source->Urine   LIPASE    Lipase 39     HEPATITIS C ANTIBODY   HIV 1 / 2 ANTIBODY        ECG Results              EKG 12-lead (Final result)        Collection Time Result Time QRS Duration OHS QTC Calculation    02/06/25 12:02:30 02/06/25 16:43:40 96 447                     Final result by Interface, Lab In [facility] (02/06/25 16:43:42)                   Narrative:    Test Reason : M54.9,    Vent. Rate :  70 BPM     Atrial Rate :  70 BPM     P-R Int : 172 ms          QRS Dur :  96 ms      QT Int : 414 ms       P-R-T Axes :  53  75  65 degrees    QTcB Int : 447 ms    Normal sinus rhythm  Normal ECG    Confirmed by Yvrose Rob ([id number]) on 2/6/2025 4:43:36 PM    Referred By: AAAREFERRAL SELF           Confirmed By: Yvrose Rob                                  Imaging Results              CT Abdomen Pelvis With IV Contrast NO Oral Contrast (Final result)  Result time 02/06/25 15:32:13      Final result by Vicente Ruiz MD (02/06/25 15:32:13)                   Impression:      1. No acute findings in the abdomen or pelvis to help explain the patient's symptoms.  2. Additional observations  as described.      Electronically signed by: Vicente Noriegaadalid  Date:    02/06/2025  Time:    15:32               Narrative:    EXAMINATION:  CT ABDOMEN PELVIS WITH IV CONTRAST    CLINICAL HISTORY:  Abdominal pain, acute, nonlocalized;    TECHNIQUE:  Axial post contrast imaging was performed with 100 mL Omnipaque 350 IV contrast.    COMPARISON:  Multiple prior exams.    FINDINGS:  CT ABDOMEN: The lung bases are clear.  There is a small sliding-type hiatal hernia.  The liver and gallbladder enhance normally, with no biliary ductal dilatation.    The spleen is normal in size and enhances normally, with the pancreas, adrenal glands and kidneys enhancing normally.  There are hypoattenuating right renal lesions suggesting cysts, with no renal calculi, ureteral calculi, or hydronephrosis.  Diffuse calcified plaque involves normal-caliber abdominal aorta and iliac arteries.    No bowel wall thickening, inflammation, or bowel obstruction.  The appendix is normal.  There are scattered colon diverticula.  No ascites or intraperitoneal free air.    CT PELVIS: There is a rectal suture line with the rectum, urinary bladder and vaginal cuff enhancing normally.  There is no pelvic free fluid or mass, with no enlarged pelvic or inguinal lymph nodes.    The extraperitoneal soft tissues enhance normally.  There are no acute fractures or destructive osseous lesions, with intervertebral disc space narrowing and facet arthropathy in the lumbar spine, and 4 mm anterolisthesis of L4 upon L5.  There is degenerative narrowing of the hip joint spaces.                                       Medications   acetaminophen tablet 1,000 mg (1,000 mg Oral Given 2/6/25 1536)   methocarbamoL tablet 1,000 mg (1,000 mg Oral Given 2/6/25 1536)   iohexoL (OMNIPAQUE 350) injection 100 mL (100 mLs Intravenous Given 2/6/25 1521)     Medical Decision Making  Differential includes but not limited to lumbar strain, chronic back pain acute exacerbation, UTI,  pyelonephritis, yeast infection, diverticulitis, abscess, perforation    Emergent evaluation of a 79-year-old female presents emergency department back pain suprapubic pain, vaginal pain.  Patient is well-appearing, nontoxic, no distress.  Patient evaluated in the emergency department had a CT scan of the abdomen and pelvis which does not show any acute pathology.  Urinalysis does not show any signs of infection.  It visual inspection of the patient's vagina does not show any obvious yeast infection.  Labs are unremarkable.  Patient more than likely has a lumbar strain/sciatica.  Will recommend supportive care with Tylenol, Robaxin and will discharge patient home with outpatient follow up.    A dictation software program was used for this note.  Please expect some simple typographical  errors in this note.    I had a detailed discussion with the patient and/or guardian regarding: The historical points, exam findings, and diagnostic results supporting the discharge diagnosis, lab results, pertinent radiology results, and the need for outpatient follow-up, for definitive care with a family practitioner and to return to the emergency department if symptoms worsen or persist or if there are any questions or concerns that arise at home. All questions have been answered in detail. Strict return to Emergency Department precautions have been provided       Amount and/or Complexity of Data Reviewed  External Data Reviewed: labs and notes.  Labs: ordered. Decision-making details documented in ED Course.  Radiology: ordered and independent interpretation performed. Decision-making details documented in ED Course.  ECG/medicine tests: ordered and independent interpretation performed. Decision-making details documented in ED Course.    Risk  OTC drugs.  Prescription drug management.  Decision regarding hospitalization.               ED Course as of 02/06/25 2011 Thu Feb 06, 2025 1217 EKG 12:02 p.m. normal sinus rhythm rate  of 70.  No ST elevation or depression.  No STEMI.  EKG interpreted independently by me. [JR]      ED Course User Index  [JR] Fredrick Baca DO                           Clinical Impression:  Final diagnoses:  [M54.9] Back pain  [M54.50] Acute right-sided low back pain without sciatica (Primary)          ED Disposition Condition    Discharge Stable          ED Prescriptions       Medication Sig Dispense Start Date End Date Auth. Provider    methocarbamoL (ROBAXIN) 500 MG Tab Take 2 tablets (1,000 mg total) by mouth 3 (three) times daily. for 10 days 60 tablet 2/6/2025 2/16/2025 Fredrick Baca DO    LIDOcaine (LIDODERM) 5 % Place 1 patch onto the skin once daily. Remove & Discard patch within 12 hours or as directed by MD for 14 days 14 patch 2/6/2025 2/20/2025 Fredrick Baca DO          Follow-up Information       Follow up With Specialties Details Why Contact Info Additional Information    HANNAH King MD Endocrinology, Internal Medicine In 3 days  [address]  [address]  [place] [address]  [phone]       [facility] Emergency [facility]  If symptoms worsen [address] [facility] [id number]  [phone] 1st floor             Fredrick Baca DO  02/06/25 2011

## 2025-07-16 DIAGNOSIS — E04.2 NONTOXIC MULTINODULAR GOITER: Primary | ICD-10-CM

## 2025-08-21 ENCOUNTER — TELEPHONE (OUTPATIENT)
Dept: PRIMARY CARE CLINIC | Facility: CLINIC | Age: 80
End: 2025-08-21

## 2025-08-21 ENCOUNTER — OFFICE VISIT (OUTPATIENT)
Dept: PRIMARY CARE CLINIC | Facility: CLINIC | Age: 80
End: 2025-08-21
Payer: MEDICARE

## 2025-08-21 VITALS
OXYGEN SATURATION: 98 % | RESPIRATION RATE: 15 BRPM | TEMPERATURE: 98 F | SYSTOLIC BLOOD PRESSURE: 128 MMHG | HEIGHT: 63 IN | BODY MASS INDEX: 30.46 KG/M2 | DIASTOLIC BLOOD PRESSURE: 60 MMHG | WEIGHT: 171.94 LBS | HEART RATE: 96 BPM

## 2025-08-21 DIAGNOSIS — N76.0 ACUTE VAGINITIS: Primary | ICD-10-CM

## 2025-08-21 DIAGNOSIS — R10.2 VAGINAL PAIN: ICD-10-CM

## 2025-08-21 DIAGNOSIS — I10 HYPERTENSION, UNSPECIFIED TYPE: ICD-10-CM

## 2025-08-21 DIAGNOSIS — G35 MS (MULTIPLE SCLEROSIS): ICD-10-CM

## 2025-08-21 PROBLEM — K43.2 INCISIONAL HERNIA, WITHOUT OBSTRUCTION OR GANGRENE: Status: RESOLVED | Noted: 2023-06-09 | Resolved: 2025-08-21

## 2025-08-21 LAB
BILIRUB UR QL STRIP.AUTO: NEGATIVE
CLARITY UR: CLEAR
COLOR UR AUTO: YELLOW
GLUCOSE UR QL STRIP: NEGATIVE
HGB UR QL STRIP: NEGATIVE
KETONES UR QL STRIP: NEGATIVE
LEUKOCYTE ESTERASE UR QL STRIP: ABNORMAL
MICROSCOPIC COMMENT: NORMAL
NITRITE UR QL STRIP: NEGATIVE
PH UR STRIP: 7 [PH]
PROT UR QL STRIP: NEGATIVE
RBC #/AREA URNS AUTO: 1 /HPF (ref 0–4)
SP GR UR STRIP: 1.01
SQUAMOUS #/AREA URNS AUTO: <1 /HPF
UROBILINOGEN UR STRIP-ACNC: NEGATIVE EU/DL
WBC #/AREA URNS AUTO: 1 /HPF (ref 0–5)

## 2025-08-21 PROCEDURE — 99203 OFFICE O/P NEW LOW 30 MIN: CPT | Mod: S$PBB,,, | Performed by: NURSE PRACTITIONER

## 2025-08-21 PROCEDURE — 99999 PR PBB SHADOW E&M-EST. PATIENT-LVL V: CPT | Mod: PBBFAC,,, | Performed by: NURSE PRACTITIONER

## 2025-08-21 PROCEDURE — 99215 OFFICE O/P EST HI 40 MIN: CPT | Mod: PBBFAC,PN | Performed by: NURSE PRACTITIONER

## 2025-08-21 RX ORDER — FLUCONAZOLE 150 MG/1
150 TABLET ORAL DAILY
Qty: 1 TABLET | Refills: 0 | Status: SHIPPED | OUTPATIENT
Start: 2025-08-21 | End: 2025-08-22

## 2025-08-21 RX ORDER — POLYETHYLENE GLYCOL 3350 17 G/17G
POWDER, FOR SOLUTION ORAL
COMMUNITY
Start: 2025-02-27

## 2025-08-21 RX ORDER — METHOCARBAMOL 500 MG/1
TABLET, FILM COATED ORAL
COMMUNITY

## 2025-08-21 RX ORDER — METHIMAZOLE 5 MG/1
5 TABLET ORAL
COMMUNITY

## 2025-08-21 RX ORDER — LACTULOSE 10 G/15ML
SOLUTION ORAL; RECTAL
COMMUNITY
Start: 2025-03-10

## (undated) DEVICE — TROCAR OPTICAL ZTHREAD 5MMX100MM CTF03

## (undated) DEVICE — CABLE MONOPOLAR 10FT DISPOSABLE

## (undated) DEVICE — DISSECTOR KITTNER 13300

## (undated) DEVICE — SUTURE VICRYL 3-0 SH 27 VCP416H

## (undated) DEVICE — TOWEL OR NONABSORB ADH 17X26

## (undated) DEVICE — FLUTE FULL ROUND 19FR 072190

## (undated) DEVICE — SOL CLEARIFY VISUALIZATION LAP

## (undated) DEVICE — DRAPE COLUMN DAVINCI XI

## (undated) DEVICE — GLOVE #7.5 BIOGEL 30475

## (undated) DEVICE — UNDERGLOVES BIOGEL PI SIZE 8

## (undated) DEVICE — RELOAD LINEAR 6R45B

## (undated) DEVICE — SYRINGE HYPODERMC LL 22G 1.5 ECLIPSE 30

## (undated) DEVICE — GOWN SMART LRG 044673

## (undated) DEVICE — TROCAR ENDO Z THREAD KII 5X100

## (undated) DEVICE — SCISSORS 5MM APPLIED MEDICAL   CB030

## (undated) DEVICE — TUBING CYSTO DOUBLE 654301

## (undated) DEVICE — SOL WATER STRL IRR 1000ML

## (undated) DEVICE — SOLUTION H2O IRRIGATION 3000ML R8006

## (undated) DEVICE — DRAPE ABDOMINAL TIBURON 14X11

## (undated) DEVICE — SUCTION/IRRIGATOR W/TIP

## (undated) DEVICE — SUT V-LOC 180 2-0 GS-22 9IN

## (undated) DEVICE — COVER PROXIMA MAYO STAND

## (undated) DEVICE — COVER TIP CURVED SCISSORS XI

## (undated) DEVICE — SYR ONLY LUER LOCK 20CC

## (undated) DEVICE — SPONGE LAP 18X18

## (undated) DEVICE — GLOVE SURG ULTRA TOUCH 7.5

## (undated) DEVICE — CUTTER LINEAR TLC75

## (undated) DEVICE — STAPLER ECHELON CIRCULAR POWERED 29MM

## (undated) DEVICE — SOL ELECTROLUBE ANTI-STIC

## (undated) DEVICE — SUTURE PDS 1 TP-1 48 PDP880G

## (undated) DEVICE — SUTURE SILK 2-0 SH 18 CR C012D

## (undated) DEVICE — TROCAR KII FIOS 12MM X 100MM

## (undated) DEVICE — APPLICATOR CHLORAPREP ORN 26ML

## (undated) DEVICE — SEAL UNIVERSAL 5MM-8MM XI

## (undated) DEVICE — STRIP MEDI WND CLSR 1/2X4IN

## (undated) DEVICE — BULB DRAIN 100CC 70740

## (undated) DEVICE — GOWN X-LARGE 044674

## (undated) DEVICE — DRAPE WARMER ORS-100

## (undated) DEVICE — SET TUBE PNEUMOCLEAR SE HI FLO

## (undated) DEVICE — SOLUTION NACL 0.9% 3000ML

## (undated) DEVICE — RETRACTOR WOUND GELPORT C8XX2

## (undated) DEVICE — ELECTRODE REM PLYHSV RETURN 9

## (undated) DEVICE — SUTURE SILK 3-0 SH 18 C013D

## (undated) DEVICE — LIGASURE LF1837 (REPLACES LF1637)

## (undated) DEVICE — PACK CYSTOSCOPY III

## (undated) DEVICE — RELOAD LINEAR TR45W

## (undated) DEVICE — TOWEL OR DISP STRL BLUE 4/PK

## (undated) DEVICE — SUTURE SILK 2-0 30 A305H

## (undated) DEVICE — GUIDEWIRE URO STRGHT 3CM TIP.035X150CM

## (undated) DEVICE — SLEEVE SCD EXPRESS KNEE MEDIUM

## (undated) DEVICE — SUT 3/0 27IN COATED VICRYL

## (undated) DEVICE — UNDERGLOVE BIOGEL PI MICRO BLUE SZ 8

## (undated) DEVICE — GOWN POLY REINF BRTH SLV XL

## (undated) DEVICE — ADHESIVE DERMABOND ADVANCED

## (undated) DEVICE — SUT MONOCRYL 4-0 PS-2

## (undated) DEVICE — TRAY CATH FOL SIL URIMTR 16FR

## (undated) DEVICE — NDL SURE-SNAP HYPO SAF 21GX1.5

## (undated) DEVICE — TRAY MINOR SLIDELL MEMORIAL HOSPITAL

## (undated) DEVICE — SUTURE SILK 0 30 A306H

## (undated) DEVICE — TRAY GENERAL LAPAROSCOPY

## (undated) DEVICE — DRESSING POST OP MEPILEX AG 4X8

## (undated) DEVICE — PAD BOVIE ADULT

## (undated) DEVICE — PACK LITHOTOMY 88521

## (undated) DEVICE — STAPLER SKIN NON ROTATE PXW35

## (undated) DEVICE — PACK CUSTOM UNIV BASIN SLI

## (undated) DEVICE — SLEEVE TROCAR 5MMX100MM  CTS02

## (undated) DEVICE — GLOVE BIOGEL MICRO SURGEON PINK SZ 6.5

## (undated) DEVICE — NEEDLE INSUFFLATION 120MM 172015

## (undated) DEVICE — GLOVE BIOGEL MICRO SURGEON PINK SZ 7.5

## (undated) DEVICE — SOLUTION IRRI NS BOTTLE 1000ML R5200-01

## (undated) DEVICE — SUTURE SILK 3-0 30 A304H

## (undated) DEVICE — DRAPE ARM DAVINCI XI

## (undated) DEVICE — SYR 50ML CATH TIP

## (undated) DEVICE — JELLY LUBRICATING TUBE 4OZ 4OZLUB

## (undated) DEVICE — Device

## (undated) DEVICE — TUBING SUCTION 12' ST2003

## (undated) DEVICE — UNDERGLOVE BIOGEL PI MICRO BLUE SZ 6.5

## (undated) DEVICE — NDL INJETAK ADJ TIP 70CM

## (undated) DEVICE — CUTTER LINEAR FLEX ARTICNG 45MM ATS45

## (undated) DEVICE — PACK SIRUS BASIC V SURG STRL

## (undated) DEVICE — SCRUB BACTOSHIELD 134439

## (undated) DEVICE — SCISSOR 5MMX35CM DIRECT DRIVE

## (undated) DEVICE — TROCAR OPTICAL ZTHREAD 12MMX100MM CTF73

## (undated) DEVICE — SOLUTION IRRI H2O BOTTLE 1000ML

## (undated) DEVICE — BLADE SURG CARBON STEEL SZ11

## (undated) DEVICE — SUT VLOC BLU GS-22 SZ0 18IN

## (undated) DEVICE — SYR LUER LOCK STERILE 10ML

## (undated) DEVICE — PAD TRENDELENBURG POS. KIT

## (undated) DEVICE — SUTURE ETHILON 2-0 PS 18 585H

## (undated) DEVICE — GLOVE BIOGEL MICRO SURGEON PINK SZ 7

## (undated) DEVICE — OBTURATOR BLADELESS 8MM XI CLR